# Patient Record
Sex: MALE | URBAN - METROPOLITAN AREA
[De-identification: names, ages, dates, MRNs, and addresses within clinical notes are randomized per-mention and may not be internally consistent; named-entity substitution may affect disease eponyms.]

---

## 2019-03-15 ENCOUNTER — EMERGENCY (EMERGENCY)
Facility: HOSPITAL | Age: 44
LOS: 1 days | Discharge: OTHER INSTITUTION | End: 2019-03-15
Attending: STUDENT IN AN ORGANIZED HEALTH CARE EDUCATION/TRAINING PROGRAM
Payer: COMMERCIAL

## 2019-03-15 VITALS
HEIGHT: 70 IN | WEIGHT: 220.02 LBS | TEMPERATURE: 99 F | OXYGEN SATURATION: 97 % | HEART RATE: 82 BPM | RESPIRATION RATE: 18 BRPM | SYSTOLIC BLOOD PRESSURE: 154 MMHG | DIASTOLIC BLOOD PRESSURE: 96 MMHG

## 2019-03-15 LAB
ALBUMIN SERPL ELPH-MCNC: 4.5 G/DL — SIGNIFICANT CHANGE UP (ref 3.3–5.2)
ALP SERPL-CCNC: 99 U/L — SIGNIFICANT CHANGE UP (ref 40–120)
ALT FLD-CCNC: 45 U/L — HIGH
ANION GAP SERPL CALC-SCNC: 11 MMOL/L — SIGNIFICANT CHANGE UP (ref 5–17)
APTT BLD: 31 SEC — SIGNIFICANT CHANGE UP (ref 27.5–36.3)
AST SERPL-CCNC: 21 U/L — SIGNIFICANT CHANGE UP
BASOPHILS # BLD AUTO: 0 K/UL — SIGNIFICANT CHANGE UP (ref 0–0.2)
BASOPHILS NFR BLD AUTO: 0.2 % — SIGNIFICANT CHANGE UP (ref 0–2)
BILIRUB SERPL-MCNC: 0.5 MG/DL — SIGNIFICANT CHANGE UP (ref 0.4–2)
BUN SERPL-MCNC: 16 MG/DL — SIGNIFICANT CHANGE UP (ref 8–20)
CALCIUM SERPL-MCNC: 9.6 MG/DL — SIGNIFICANT CHANGE UP (ref 8.6–10.2)
CHLORIDE SERPL-SCNC: 106 MMOL/L — SIGNIFICANT CHANGE UP (ref 98–107)
CO2 SERPL-SCNC: 27 MMOL/L — SIGNIFICANT CHANGE UP (ref 22–29)
CREAT SERPL-MCNC: 1.07 MG/DL — SIGNIFICANT CHANGE UP (ref 0.5–1.3)
EOSINOPHIL # BLD AUTO: 0.1 K/UL — SIGNIFICANT CHANGE UP (ref 0–0.5)
EOSINOPHIL NFR BLD AUTO: 0.6 % — SIGNIFICANT CHANGE UP (ref 0–5)
GLUCOSE SERPL-MCNC: 117 MG/DL — HIGH (ref 70–115)
HCT VFR BLD CALC: 42.9 % — SIGNIFICANT CHANGE UP (ref 42–52)
HGB BLD-MCNC: 15.4 G/DL — SIGNIFICANT CHANGE UP (ref 14–18)
INR BLD: 1.04 RATIO — SIGNIFICANT CHANGE UP (ref 0.88–1.16)
LYMPHOCYTES # BLD AUTO: 17.9 % — LOW (ref 20–55)
LYMPHOCYTES # BLD AUTO: 2.2 K/UL — SIGNIFICANT CHANGE UP (ref 1–4.8)
MCHC RBC-ENTMCNC: 31.2 PG — HIGH (ref 27–31)
MCHC RBC-ENTMCNC: 35.9 G/DL — SIGNIFICANT CHANGE UP (ref 32–36)
MCV RBC AUTO: 86.8 FL — SIGNIFICANT CHANGE UP (ref 80–94)
MONOCYTES # BLD AUTO: 1.1 K/UL — HIGH (ref 0–0.8)
MONOCYTES NFR BLD AUTO: 8.8 % — SIGNIFICANT CHANGE UP (ref 3–10)
NEUTROPHILS # BLD AUTO: 9 K/UL — HIGH (ref 1.8–8)
NEUTROPHILS NFR BLD AUTO: 71.9 % — SIGNIFICANT CHANGE UP (ref 37–73)
PLATELET # BLD AUTO: 210 K/UL — SIGNIFICANT CHANGE UP (ref 150–400)
POTASSIUM SERPL-MCNC: 4.8 MMOL/L — SIGNIFICANT CHANGE UP (ref 3.5–5.3)
POTASSIUM SERPL-SCNC: 4.8 MMOL/L — SIGNIFICANT CHANGE UP (ref 3.5–5.3)
PROT SERPL-MCNC: 7.3 G/DL — SIGNIFICANT CHANGE UP (ref 6.6–8.7)
PROTHROM AB SERPL-ACNC: 12 SEC — SIGNIFICANT CHANGE UP (ref 10–12.9)
RBC # BLD: 4.94 M/UL — SIGNIFICANT CHANGE UP (ref 4.6–6.2)
RBC # FLD: 12.9 % — SIGNIFICANT CHANGE UP (ref 11–15.6)
SODIUM SERPL-SCNC: 144 MMOL/L — SIGNIFICANT CHANGE UP (ref 135–145)
WBC # BLD: 12.5 K/UL — HIGH (ref 4.8–10.8)
WBC # FLD AUTO: 12.5 K/UL — HIGH (ref 4.8–10.8)

## 2019-03-15 PROCEDURE — 99284 EMERGENCY DEPT VISIT MOD MDM: CPT

## 2019-03-15 PROCEDURE — 99285 EMERGENCY DEPT VISIT HI MDM: CPT

## 2019-03-15 RX ORDER — SODIUM CHLORIDE 9 MG/ML
1000 INJECTION INTRAMUSCULAR; INTRAVENOUS; SUBCUTANEOUS ONCE
Qty: 0 | Refills: 0 | Status: COMPLETED | OUTPATIENT
Start: 2019-03-15 | End: 2019-03-15

## 2019-03-15 RX ORDER — METOCLOPRAMIDE HCL 10 MG
10 TABLET ORAL ONCE
Qty: 0 | Refills: 0 | Status: COMPLETED | OUTPATIENT
Start: 2019-03-15 | End: 2019-03-15

## 2019-03-15 RX ADMIN — SODIUM CHLORIDE 1000 MILLILITER(S): 9 INJECTION INTRAMUSCULAR; INTRAVENOUS; SUBCUTANEOUS at 22:59

## 2019-03-15 RX ADMIN — Medication 10 MILLIGRAM(S): at 22:59

## 2019-03-15 NOTE — ED STATDOCS - PROGRESS NOTE DETAILS
43 y/o M with PMHx of HTN presents to the ED c/o headache, fever, nausea , numbness of left side, and vomiting, onset 5 days ago. Pt reports intermittent headache and states persistent headache; took Ibuprofen 800 mg and Tamiflu with no relief of symptoms. States, " my teeth hurts and it feels like someone hit me in the back of the head with a bat"; rates headache 5-7/10 in severity. Pt seen by [facility] MD and diagnosed with fever as of 2 days ago and reports fever is returning. Denies similar past headache due to today's symptoms severity. Denies weakness on arms legs, difficulty breathing. Denies diarrhea. Sensation intact. No gross deficits noted. Will send to [facility] for further evaluation and work up.

## 2019-03-15 NOTE — ED STATDOCS - CLINICAL SUMMARY MEDICAL DECISION MAKING FREE TEXT BOX
I, Radha Yates, performed the initial face to face bedside interview with this patient regarding history of present illness and determined that the patient should be seen in the main ED.  The history, was documented by the scribe in my presence and I attest to the accuracy of the documentation.

## 2019-03-16 ENCOUNTER — INPATIENT (INPATIENT)
Facility: HOSPITAL | Age: 44
LOS: 4 days | Discharge: HOME | DRG: 91 | End: 2019-03-21
Attending: SPECIALIST | Admitting: INTERNAL MEDICINE
Payer: COMMERCIAL

## 2019-03-16 ENCOUNTER — TRANSCRIPTION ENCOUNTER (OUTPATIENT)
Age: 44
End: 2019-03-16

## 2019-03-16 VITALS
RESPIRATION RATE: 18 BRPM | SYSTOLIC BLOOD PRESSURE: 139 MMHG | OXYGEN SATURATION: 98 % | HEART RATE: 65 BPM | DIASTOLIC BLOOD PRESSURE: 95 MMHG

## 2019-03-16 VITALS
OXYGEN SATURATION: 98 % | DIASTOLIC BLOOD PRESSURE: 100 MMHG | RESPIRATION RATE: 18 BRPM | WEIGHT: 220.02 LBS | HEART RATE: 68 BPM | SYSTOLIC BLOOD PRESSURE: 146 MMHG | HEIGHT: 68 IN

## 2019-03-16 DIAGNOSIS — I60.9 NONTRAUMATIC SUBARACHNOID HEMORRHAGE, UNSPECIFIED: ICD-10-CM

## 2019-03-16 DIAGNOSIS — I62.9 NONTRAUMATIC INTRACRANIAL HEMORRHAGE, UNSPECIFIED: ICD-10-CM

## 2019-03-16 LAB
ALBUMIN SERPL ELPH-MCNC: 4.5 G/DL — SIGNIFICANT CHANGE UP (ref 3.3–5)
ALBUMIN SERPL ELPH-MCNC: 4.7 G/DL — SIGNIFICANT CHANGE UP (ref 3.3–5)
ALP SERPL-CCNC: 84 U/L — SIGNIFICANT CHANGE UP (ref 40–120)
ALP SERPL-CCNC: 84 U/L — SIGNIFICANT CHANGE UP (ref 40–120)
ALT FLD-CCNC: 42 U/L — SIGNIFICANT CHANGE UP (ref 10–45)
ALT FLD-CCNC: 44 U/L — SIGNIFICANT CHANGE UP (ref 10–45)
ANION GAP SERPL CALC-SCNC: 13 MMOL/L — SIGNIFICANT CHANGE UP (ref 5–17)
ANION GAP SERPL CALC-SCNC: 13 MMOL/L — SIGNIFICANT CHANGE UP (ref 5–17)
APTT BLD: 28.2 SEC — SIGNIFICANT CHANGE UP (ref 27.5–36.3)
AST SERPL-CCNC: 17 U/L — SIGNIFICANT CHANGE UP (ref 10–40)
AST SERPL-CCNC: 21 U/L — SIGNIFICANT CHANGE UP (ref 10–40)
BASOPHILS # BLD AUTO: 0 K/UL — SIGNIFICANT CHANGE UP (ref 0–0.2)
BASOPHILS # BLD AUTO: 0 K/UL — SIGNIFICANT CHANGE UP (ref 0–0.2)
BASOPHILS NFR BLD AUTO: 0.3 % — SIGNIFICANT CHANGE UP (ref 0–2)
BASOPHILS NFR BLD AUTO: 0.3 % — SIGNIFICANT CHANGE UP (ref 0–2)
BILIRUB SERPL-MCNC: 0.6 MG/DL — SIGNIFICANT CHANGE UP (ref 0.2–1.2)
BILIRUB SERPL-MCNC: 0.7 MG/DL — SIGNIFICANT CHANGE UP (ref 0.2–1.2)
BLD GP AB SCN SERPL QL: NEGATIVE — SIGNIFICANT CHANGE UP
BUN SERPL-MCNC: 13 MG/DL — SIGNIFICANT CHANGE UP (ref 7–23)
BUN SERPL-MCNC: 13 MG/DL — SIGNIFICANT CHANGE UP (ref 7–23)
CALCIUM SERPL-MCNC: 9.1 MG/DL — SIGNIFICANT CHANGE UP (ref 8.4–10.5)
CALCIUM SERPL-MCNC: 9.2 MG/DL — SIGNIFICANT CHANGE UP (ref 8.4–10.5)
CHLORIDE SERPL-SCNC: 103 MMOL/L — SIGNIFICANT CHANGE UP (ref 96–108)
CHLORIDE SERPL-SCNC: 104 MMOL/L — SIGNIFICANT CHANGE UP (ref 96–108)
CO2 SERPL-SCNC: 22 MMOL/L — SIGNIFICANT CHANGE UP (ref 22–31)
CO2 SERPL-SCNC: 24 MMOL/L — SIGNIFICANT CHANGE UP (ref 22–31)
CREAT SERPL-MCNC: 0.98 MG/DL — SIGNIFICANT CHANGE UP (ref 0.5–1.3)
CREAT SERPL-MCNC: 1.12 MG/DL — SIGNIFICANT CHANGE UP (ref 0.5–1.3)
EOSINOPHIL # BLD AUTO: 0 K/UL — SIGNIFICANT CHANGE UP (ref 0–0.5)
EOSINOPHIL # BLD AUTO: 0.1 K/UL — SIGNIFICANT CHANGE UP (ref 0–0.5)
EOSINOPHIL NFR BLD AUTO: 0.3 % — SIGNIFICANT CHANGE UP (ref 0–6)
EOSINOPHIL NFR BLD AUTO: 0.4 % — SIGNIFICANT CHANGE UP (ref 0–6)
GLUCOSE SERPL-MCNC: 100 MG/DL — HIGH (ref 70–99)
GLUCOSE SERPL-MCNC: 96 MG/DL — SIGNIFICANT CHANGE UP (ref 70–99)
HCT VFR BLD CALC: 42.6 % — SIGNIFICANT CHANGE UP (ref 39–50)
HCT VFR BLD CALC: 42.6 % — SIGNIFICANT CHANGE UP (ref 39–50)
HGB BLD-MCNC: 14.9 G/DL — SIGNIFICANT CHANGE UP (ref 13–17)
HGB BLD-MCNC: 15.4 G/DL — SIGNIFICANT CHANGE UP (ref 13–17)
INR BLD: 1.11 RATIO — SIGNIFICANT CHANGE UP (ref 0.88–1.16)
LYMPHOCYTES # BLD AUTO: 1.9 K/UL — SIGNIFICANT CHANGE UP (ref 1–3.3)
LYMPHOCYTES # BLD AUTO: 1.9 K/UL — SIGNIFICANT CHANGE UP (ref 1–3.3)
LYMPHOCYTES # BLD AUTO: 14.9 % — SIGNIFICANT CHANGE UP (ref 13–44)
LYMPHOCYTES # BLD AUTO: 15.5 % — SIGNIFICANT CHANGE UP (ref 13–44)
MCHC RBC-ENTMCNC: 30.9 PG — SIGNIFICANT CHANGE UP (ref 27–34)
MCHC RBC-ENTMCNC: 31.7 PG — SIGNIFICANT CHANGE UP (ref 27–34)
MCHC RBC-ENTMCNC: 35 GM/DL — SIGNIFICANT CHANGE UP (ref 32–36)
MCHC RBC-ENTMCNC: 36.1 GM/DL — HIGH (ref 32–36)
MCV RBC AUTO: 87.9 FL — SIGNIFICANT CHANGE UP (ref 80–100)
MCV RBC AUTO: 88.3 FL — SIGNIFICANT CHANGE UP (ref 80–100)
MONOCYTES # BLD AUTO: 0.9 K/UL — SIGNIFICANT CHANGE UP (ref 0–0.9)
MONOCYTES # BLD AUTO: 0.9 K/UL — SIGNIFICANT CHANGE UP (ref 0–0.9)
MONOCYTES NFR BLD AUTO: 7.1 % — SIGNIFICANT CHANGE UP (ref 2–14)
MONOCYTES NFR BLD AUTO: 7.1 % — SIGNIFICANT CHANGE UP (ref 2–14)
NEUTROPHILS # BLD AUTO: 10 K/UL — HIGH (ref 1.8–7.4)
NEUTROPHILS # BLD AUTO: 9.6 K/UL — HIGH (ref 1.8–7.4)
NEUTROPHILS NFR BLD AUTO: 76.8 % — SIGNIFICANT CHANGE UP (ref 43–77)
NEUTROPHILS NFR BLD AUTO: 77.3 % — HIGH (ref 43–77)
PLATELET # BLD AUTO: 221 K/UL — SIGNIFICANT CHANGE UP (ref 150–400)
PLATELET # BLD AUTO: 224 K/UL — SIGNIFICANT CHANGE UP (ref 150–400)
POTASSIUM SERPL-MCNC: 3.8 MMOL/L — SIGNIFICANT CHANGE UP (ref 3.5–5.3)
POTASSIUM SERPL-MCNC: 3.8 MMOL/L — SIGNIFICANT CHANGE UP (ref 3.5–5.3)
POTASSIUM SERPL-SCNC: 3.8 MMOL/L — SIGNIFICANT CHANGE UP (ref 3.5–5.3)
POTASSIUM SERPL-SCNC: 3.8 MMOL/L — SIGNIFICANT CHANGE UP (ref 3.5–5.3)
PROT SERPL-MCNC: 6.9 G/DL — SIGNIFICANT CHANGE UP (ref 6–8.3)
PROT SERPL-MCNC: 7 G/DL — SIGNIFICANT CHANGE UP (ref 6–8.3)
PROTHROM AB SERPL-ACNC: 12.7 SEC — SIGNIFICANT CHANGE UP (ref 10–12.9)
RAPID RVP RESULT: SIGNIFICANT CHANGE UP
RBC # BLD: 4.82 M/UL — SIGNIFICANT CHANGE UP (ref 4.2–5.8)
RBC # BLD: 4.84 M/UL — SIGNIFICANT CHANGE UP (ref 4.2–5.8)
RBC # FLD: 12 % — SIGNIFICANT CHANGE UP (ref 10.3–14.5)
RBC # FLD: 12.3 % — SIGNIFICANT CHANGE UP (ref 10.3–14.5)
RH IG SCN BLD-IMP: POSITIVE — SIGNIFICANT CHANGE UP
RH IG SCN BLD-IMP: POSITIVE — SIGNIFICANT CHANGE UP
SODIUM SERPL-SCNC: 139 MMOL/L — SIGNIFICANT CHANGE UP (ref 135–145)
SODIUM SERPL-SCNC: 140 MMOL/L — SIGNIFICANT CHANGE UP (ref 135–145)
WBC # BLD: 12.5 K/UL — HIGH (ref 3.8–10.5)
WBC # BLD: 12.9 K/UL — HIGH (ref 3.8–10.5)
WBC # FLD AUTO: 12.5 K/UL — HIGH (ref 3.8–10.5)
WBC # FLD AUTO: 12.9 K/UL — HIGH (ref 3.8–10.5)

## 2019-03-16 PROCEDURE — 99285 EMERGENCY DEPT VISIT HI MDM: CPT | Mod: 25

## 2019-03-16 PROCEDURE — 70496 CT ANGIOGRAPHY HEAD: CPT

## 2019-03-16 PROCEDURE — 70450 CT HEAD/BRAIN W/O DYE: CPT

## 2019-03-16 PROCEDURE — 99291 CRITICAL CARE FIRST HOUR: CPT

## 2019-03-16 PROCEDURE — 85730 THROMBOPLASTIN TIME PARTIAL: CPT

## 2019-03-16 PROCEDURE — 85027 COMPLETE CBC AUTOMATED: CPT

## 2019-03-16 PROCEDURE — 85610 PROTHROMBIN TIME: CPT

## 2019-03-16 PROCEDURE — 70496 CT ANGIOGRAPHY HEAD: CPT | Mod: 26

## 2019-03-16 PROCEDURE — 96374 THER/PROPH/DIAG INJ IV PUSH: CPT | Mod: XU

## 2019-03-16 PROCEDURE — 70450 CT HEAD/BRAIN W/O DYE: CPT | Mod: 26

## 2019-03-16 PROCEDURE — 80053 COMPREHEN METABOLIC PANEL: CPT

## 2019-03-16 PROCEDURE — 36415 COLL VENOUS BLD VENIPUNCTURE: CPT

## 2019-03-16 PROCEDURE — 96375 TX/PRO/DX INJ NEW DRUG ADDON: CPT | Mod: XU

## 2019-03-16 PROCEDURE — 96361 HYDRATE IV INFUSION ADD-ON: CPT

## 2019-03-16 PROCEDURE — 70450 CT HEAD/BRAIN W/O DYE: CPT | Mod: 26,77,59

## 2019-03-16 RX ORDER — OXYCODONE AND ACETAMINOPHEN 5; 325 MG/1; MG/1
1 TABLET ORAL ONCE
Qty: 0 | Refills: 0 | Status: DISCONTINUED | OUTPATIENT
Start: 2019-03-16 | End: 2019-03-16

## 2019-03-16 RX ORDER — ONDANSETRON 8 MG/1
4 TABLET, FILM COATED ORAL ONCE
Qty: 0 | Refills: 0 | Status: COMPLETED | OUTPATIENT
Start: 2019-03-16 | End: 2019-03-16

## 2019-03-16 RX ORDER — NICARDIPINE HYDROCHLORIDE 30 MG/1
5 CAPSULE, EXTENDED RELEASE ORAL
Qty: 40 | Refills: 0 | Status: DISCONTINUED | OUTPATIENT
Start: 2019-03-16 | End: 2019-03-18

## 2019-03-16 RX ORDER — ACETAMINOPHEN 500 MG
1000 TABLET ORAL ONCE
Qty: 0 | Refills: 0 | Status: COMPLETED | OUTPATIENT
Start: 2019-03-16 | End: 2019-03-16

## 2019-03-16 RX ORDER — HYDRALAZINE HCL 50 MG
10 TABLET ORAL ONCE
Qty: 0 | Refills: 0 | Status: COMPLETED | OUTPATIENT
Start: 2019-03-16 | End: 2019-03-16

## 2019-03-16 RX ORDER — LEVETIRACETAM 250 MG/1
1000 TABLET, FILM COATED ORAL ONCE
Qty: 0 | Refills: 0 | Status: COMPLETED | OUTPATIENT
Start: 2019-03-16 | End: 2019-03-16

## 2019-03-16 RX ORDER — MORPHINE SULFATE 50 MG/1
4 CAPSULE, EXTENDED RELEASE ORAL ONCE
Qty: 0 | Refills: 0 | Status: DISCONTINUED | OUTPATIENT
Start: 2019-03-16 | End: 2019-03-16

## 2019-03-16 RX ORDER — METOPROLOL TARTRATE 50 MG
10 TABLET ORAL ONCE
Qty: 0 | Refills: 0 | Status: DISCONTINUED | OUTPATIENT
Start: 2019-03-16 | End: 2019-03-16

## 2019-03-16 RX ADMIN — MORPHINE SULFATE 4 MILLIGRAM(S): 50 CAPSULE, EXTENDED RELEASE ORAL at 11:08

## 2019-03-16 RX ADMIN — OXYCODONE AND ACETAMINOPHEN 1 TABLET(S): 5; 325 TABLET ORAL at 18:15

## 2019-03-16 RX ADMIN — SODIUM CHLORIDE 1000 MILLILITER(S): 9 INJECTION INTRAMUSCULAR; INTRAVENOUS; SUBCUTANEOUS at 01:04

## 2019-03-16 RX ADMIN — ONDANSETRON 4 MILLIGRAM(S): 8 TABLET, FILM COATED ORAL at 05:08

## 2019-03-16 RX ADMIN — LEVETIRACETAM 400 MILLIGRAM(S): 250 TABLET, FILM COATED ORAL at 06:08

## 2019-03-16 RX ADMIN — OXYCODONE AND ACETAMINOPHEN 1 TABLET(S): 5; 325 TABLET ORAL at 17:19

## 2019-03-16 RX ADMIN — Medication 1000 MILLIGRAM(S): at 14:00

## 2019-03-16 RX ADMIN — Medication 400 MILLIGRAM(S): at 19:13

## 2019-03-16 RX ADMIN — Medication 10 MILLIGRAM(S): at 12:50

## 2019-03-16 RX ADMIN — Medication 1 TABLET(S): at 02:04

## 2019-03-16 RX ADMIN — Medication 1000 MILLIGRAM(S): at 20:30

## 2019-03-16 RX ADMIN — Medication 400 MILLIGRAM(S): at 13:22

## 2019-03-16 RX ADMIN — MORPHINE SULFATE 4 MILLIGRAM(S): 50 CAPSULE, EXTENDED RELEASE ORAL at 12:20

## 2019-03-16 RX ADMIN — ONDANSETRON 4 MILLIGRAM(S): 8 TABLET, FILM COATED ORAL at 11:29

## 2019-03-16 RX ADMIN — Medication 10 MILLIGRAM(S): at 05:08

## 2019-03-16 RX ADMIN — Medication 400 MILLIGRAM(S): at 06:08

## 2019-03-16 RX ADMIN — Medication 1 TABLET(S): at 01:04

## 2019-03-16 RX ADMIN — ONDANSETRON 4 MILLIGRAM(S): 8 TABLET, FILM COATED ORAL at 13:27

## 2019-03-16 NOTE — ED ADULT NURSE NOTE - CHPI ED NUR SYMPTOMS NEG
no vomiting/no nausea/no numbness/no loss of consciousness/no blurred vision/no dizziness/no change in level of consciousness/no confusion/no weakness/no fever

## 2019-03-16 NOTE — CONSULT NOTE ADULT - SUBJECTIVE AND OBJECTIVE BOX
HPI:  43yo male PMH HTN tx from OSH, (16 Mar 2019 10:34) who p/w sudden onset severe HA that began Monday and has persisted despite attempts at medical management. Patient states that his HA has waxed and waned since onset, but has not fully resolved. He denies any vision change, focal weakness, and seizure, and does not smoke or have family hx of intracranial hemorrhage. CTH in ED shows small punctate hemorrhage of L posterior internal capsule. Per outside report, CTA at OSH shows concern for Flores Flores disease, however imaging is not available at this time. Patient is neurologically intact on exam only complaining of HA and photosensitivity.      PAST MEDICAL HISTORY   No pertinent past medical history    PAST SURGICAL HISTORY           SOCIAL HISTORY:   Occupation:   Marital Status:     FAMILY HISTORY:      PHYSICAL EXAM:    Constitutional: No Acute Distress     Neurological: AOx3, Following Commands, Moving all Extremities   PERRL, EOMI  Face symmetric tongue midline  Motor exam:          Upper extremity                         Delt     Bicep     Tricep    HG                                                 R         5/5        5/5        5/5       5/5                                               L          5/5        5/5        5/5       5/5          Lower extremity                        HF         KF        KE       DF         PF                                                  R        5/5        5/5        5/5       5/5         5/5                                               L         5/5        5/5       5/5       5/5          5/5                                                 Sensation: [x] intact to light touch  [] decreased:     Pulmonary: Clear to Auscultation, No rales, No rhonchi, No wheezes     Cardiovascular: S1, S2, Regular rate and rhythm     Gastrointestinal: Soft, Non-tender, Non-distended     Extremities: No calf tenderness     Incision:     LABS:                        15.4   12.9  )-----------( 224      ( 16 Mar 2019 07:26 )             42.6     03-16    139  |  104  |  13  ----------------------------<  100<H>  3.8   |  22  |  0.98    Ca    9.1      16 Mar 2019 07:26    TPro  7.0  /  Alb  4.5  /  TBili  0.6  /  DBili  x   /  AST  21  /  ALT  44  /  AlkPhos  84  03-16    PT/INR - ( 16 Mar 2019 07:26 )   PT: 12.7 sec;   INR: 1.11 ratio         PTT - ( 16 Mar 2019 07:26 )  PTT:28.2 sec HPI:  45yo male PMH HTN tx from OSH, (16 Mar 2019 10:34) who p/w sudden onset severe HA that began Monday and has persisted despite attempts at medical management. Patient states that his HA has waxed and waned since onset, but has not fully resolved. He denies any vision change, focal weakness, and seizure, and does not smoke or have family hx of intracranial hemorrhage. CTH in ED shows small punctate hemorrhage of L posterior external capsule/insula. Per outside report, CTA at OSH shows concern for Flores Flores disease, however imaging is not available at this time. Patient is neurologically intact on exam only complaining of HA and photosensitivity.      PAST MEDICAL HISTORY   No pertinent past medical history    PAST SURGICAL HISTORY           SOCIAL HISTORY:   Occupation:   Marital Status:     FAMILY HISTORY:      PHYSICAL EXAM:    Constitutional: No Acute Distress     Neurological: AOx3, Following Commands, Moving all Extremities   PERRL, EOMI  Face symmetric tongue midline  Motor exam:          Upper extremity                         Delt     Bicep     Tricep    HG                                                 R         5/5        5/5        5/5       5/5                                               L          5/5        5/5        5/5       5/5          Lower extremity                        HF         KF        KE       DF         PF                                                  R        5/5        5/5        5/5       5/5         5/5                                               L         5/5        5/5       5/5       5/5          5/5                                                 Sensation: [x] intact to light touch  [] decreased:     Pulmonary: Clear to Auscultation, No rales, No rhonchi, No wheezes     Cardiovascular: S1, S2, Regular rate and rhythm     Gastrointestinal: Soft, Non-tender, Non-distended     Extremities: No calf tenderness     Incision:     LABS:                        15.4   12.9  )-----------( 224      ( 16 Mar 2019 07:26 )             42.6     03-16    139  |  104  |  13  ----------------------------<  100<H>  3.8   |  22  |  0.98    Ca    9.1      16 Mar 2019 07:26    TPro  7.0  /  Alb  4.5  /  TBili  0.6  /  DBili  x   /  AST  21  /  ALT  44  /  AlkPhos  84  03-16    PT/INR - ( 16 Mar 2019 07:26 )   PT: 12.7 sec;   INR: 1.11 ratio         PTT - ( 16 Mar 2019 07:26 )  PTT:28.2 sec HPI:  43yo male PMH HTN tx from OSH, (16 Mar 2019 10:34) who p/w sudden onset severe HA that began Monday and has persisted despite attempts at medical management. Patient states that his HA has waxed and waned since onset, but has not fully resolved. He denies any vision change, focal weakness, and seizure, and does not smoke or have family hx of intracranial hemorrhage. CTH in ED shows small punctate hemorrhage of L posterior external capsule/insula. CTA at OSH shows concern for Flores Flores disease. Patient is neurologically intact on exam only complaining of HA and photosensitivity.      PAST MEDICAL HISTORY   No pertinent past medical history    PAST SURGICAL HISTORY           SOCIAL HISTORY:   Occupation:   Marital Status:     FAMILY HISTORY:      PHYSICAL EXAM:    Constitutional: No Acute Distress     Neurological: AOx3, Following Commands, Moving all Extremities   PERRL, EOMI  Face symmetric tongue midline  Motor exam:          Upper extremity                         Delt     Bicep     Tricep    HG                                                 R         5/5        5/5        5/5       5/5                                               L          5/5        5/5        5/5       5/5          Lower extremity                        HF         KF        KE       DF         PF                                                  R        5/5        5/5        5/5       5/5         5/5                                               L         5/5        5/5       5/5       5/5          5/5                                                 Sensation: [x] intact to light touch  [] decreased:     Pulmonary: Clear to Auscultation, No rales, No rhonchi, No wheezes     Cardiovascular: S1, S2, Regular rate and rhythm     Gastrointestinal: Soft, Non-tender, Non-distended     Extremities: No calf tenderness     Incision:     LABS:                        15.4   12.9  )-----------( 224      ( 16 Mar 2019 07:26 )             42.6     03-16    139  |  104  |  13  ----------------------------<  100<H>  3.8   |  22  |  0.98    Ca    9.1      16 Mar 2019 07:26    TPro  7.0  /  Alb  4.5  /  TBili  0.6  /  DBili  x   /  AST  21  /  ALT  44  /  AlkPhos  84  03-16    PT/INR - ( 16 Mar 2019 07:26 )   PT: 12.7 sec;   INR: 1.11 ratio         PTT - ( 16 Mar 2019 07:26 )  PTT:28.2 sec

## 2019-03-16 NOTE — CONSULT NOTE ADULT - CONSULT REASON
DENIZ, 10/10 HA & flu like symptoms since Monday evening SAH & L IPH on [facility]   10/10 HA & flu like symptoms since Monday evening w vomiting 5+

## 2019-03-16 NOTE — CONSULT NOTE ADULT - ASSESSMENT
10/10 HA & flu like symptoms since Monday evening  left-handed male AAOx3, nonfocal exam w 2/10 HA at this time     SAH/ IPH on CTH  monitored bed  SBP< 150mmHg   HOB >30 degrees 10/10 HA & flu like symptoms since Monday evening  left-handed male AAOx3, nonfocal exam w 2/10 HA at this time   HTN and H/H score of 2    SAH/ IPH on CTH  CTA: suspected dang dang     monitored bed  SBP< 150mmHg   HOB >30 degrees 10/10 HA & flu like symptoms since Monday evening, improving to 2/10 on exam in ED  left-handed male AAOx3, nonfocal exam w 2/10 HA at this time   HTN and H/H score of 2    SAH/ IPH on CTH  CTA: suspected dang dang     monitored bed & q1Hr Neurochecks   SBP< 150mmHg   HOB >30 degrees   AED  angiogram and MRI B w/wo santana recommended for further w/u & transfer to [facility]/ Dr. Badillo   [facility] IV for current headache/ bifrontal pressure 5/10 at the time of re-eval 5:42 AM    case d/w Dr Orta 4:57 AM/ plan above and tx to [facility]   case d/w Dr Robles/ HARSHA Lama     pt and wife updated and all questions answered in great detail

## 2019-03-16 NOTE — H&P ADULT - HISTORY OF PRESENT ILLNESS
45yo male PMH HTN tx from OSH, 43yo RH  male PMH HTN tx from OSH, p/w HA. LKW 3/11 2230. Patient developed sudden onset HA(felt like baseball bat to the head) at 2230 on 3/11; 15 mins later he developed intractable N/V. Went to Urgent Care 3/12, sent home on tamiflu. HA/nausea/vomiting persisted, so patient went to [facility] ED where CT/CTA done showing IPH in L ventral parietal lobe rupturing into subarachnoid space, CTA head w/ findings concerning for Flores Flores. Transferred to [facility] for further workup. On initial evaluation at [facility], patient HA improved although with photophobia, no focal neurologic deficits, NIHSS 0.

## 2019-03-16 NOTE — ED ADULT NURSE NOTE - OBJECTIVE STATEMENT
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 PMH of HTN. Patient presented to [facility] with headache, received 1000mg IV tylenol before transfer, now reports 3/10 pain. On arrival to [facility] ED, Patient is awake, alert, a&ox3, following commands, strong equal strength bilaterally x 4, sensory in tact. 20g peripheral IV in R ac placed at [facility] patent and draws back blood. 20g peripheral IV placed in L ac and labs drawn and sent to lab. Pt. passed dysphagia screening. Patient undressed and placed into gown, call bell in hand and side rails up with bed in lowest position for safety. blanket provided. Comfort and safety provided.

## 2019-03-16 NOTE — ED PROVIDER NOTE - ATTENDING CONTRIBUTION TO CARE
pt w larios, transferred here for Neurosurgery eval for vincent dang  moving 4/4, clear speech. non-focal exam.  bp control. dw Neurosurgery. pre-op labs. bp less than 160. keppra prior to transfer. pain control.

## 2019-03-16 NOTE — H&P ADULT - ASSESSMENT
43yo RH  male PMH HTN tx from OSH, p/w HA. NIHSS 0, MRS 0, ICH score 1. Patient w/ L parietal IPH extending into subarachnoid space, CTA head findings concerning for Flores Flores. IPH etiology unclear, likely HTN but given vascular abnormalities visualized on CTA head, would consider additional imaging evaluation/possible angiography to better characterize cerebral vasculature. Not a TPA candidate or thrombectomy due to ICH, out of window    Recs:  [] Strict BP control goal <160/90 w/ Cardene drip  [] Repeat CTH in 24hrs or prn for worsening mental status or neuro exam  [] q2 neuro checks, vitals  [] hold ASA, lovenox, use mechanical DVT prophylaxis  [] Telemetry Monitoring  [] MRI brain without contrast and MRA Head w/o contrast/MRA Neck w/ contrast(NOVA study)  [] HgA1c, Lipid profile 45yo RH  male PMH HTN tx from OSH, p/w HA. NIHSS 0, MRS 0, ICH score 1. Patient w/ L parietal IPH extending into subarachnoid space, CTA head findings concerning for Flores Flores. IPH etiology unclear, likely HTN but given vascular abnormalities visualized on CTA head, would consider additional imaging evaluation/possible angiography to better characterize cerebral vasculature. Not a TPA candidate or thrombectomy due to ICH, out of window    Recs:  [] Strict BP control goal <160/90 w/ Cardene drip  [] Repeat CTH in 24hrs or prn for worsening mental status or neuro exam  [] q4 neuro checks, vitals  [] hold ASA, lovenox, use mechanical DVT prophylaxis  [] Telemetry Monitoring  [] MRI brain without contrast and MRA Head w/o contrast/MRA Neck w/ contrast(NOVA study)  [] HgA1c, Lipid profile 43yo RH  male PMH HTN tx from OSH, p/w HA. NIHSS 0, MRS 0, ICH score 1. Patient w/ L parietal IPH extending into subarachnoid space, CTA head findings concerning for Flores Flores. IPH etiology unclear, likely HTN but given vascular abnormalities visualized on CTA head, would consider additional imaging evaluation/possible angiography to better characterize cerebral vasculature. Not a TPA candidate or thrombectomy due to ICH, out of window    Recs:  Admit to stroke unit  [] Strict BP control goal <160/90 w/ Cardene drip  [] Repeat CTH in 24hrs or prn for worsening mental status or neuro exam  [] q4 neuro checks, vitals  [] hold ASA, lovenox, use mechanical DVT prophylaxis  [] Telemetry Monitoring  [] MRI brain without contrast and MRA Head w/o contrast/MRA Neck w/ contrast(NOVA study)  [] HgA1c, Lipid profile 45yo RH  male PMH HTN tx from OSH, p/w HA. NIHSS 0, MRS 0, ICH score 1. Patient w/ L parietal IPH extending into subarachnoid space, CTA head findings concerning for Flores Flores. IPH etiology unclear, likely HTN but given vascular abnormalities visualized on CTA head, would consider additional imaging evaluation/possible angiography to better characterize cerebral vasculature. Not a TPA candidate or thrombectomy due to ICH, out of window    Recs:  Admit to stroke unit  [] Strict BP control goal <160/90 w/ Cardene drip  [] Repeat CTH in 24hrs or prn for worsening mental status or neuro exam  [] q4 neuro checks, vitals  [] hold ASA, lovenox, use mechanical DVT prophylaxis  [] Telemetry Monitoring  [] MRI brain without contrast and MRA Head w/o contrast/MRA Neck w/ contrast(NOVA study)  [] HgA1c, Lipid profile  [] Possible Angiography as per NSx    Med rec completed 45yo RH  male PMH HTN tx from OSH, p/w HA. NIHSS 0, MRS 0, ICH score 1. Patient w/ L parietal IPH extending into subarachnoid space, CTA head findings concerning for Flores Flores. IPH etiology unclear, likely HTN but given vascular abnormalities visualized on CTA head, would consider additional imaging evaluation/possible angiography to better characterize cerebral vasculature. Not a TPA candidate or thrombectomy due to ICH, out of window    Recs:  Admit to stroke unit  [] Strict BP control goal <160/90 w/ Cardene drip  [] Repeat CTH in 24hrs or prn for worsening mental status or neuro exam  [] q4 neuro checks, vitals  [] hold ASA, lovenox, use mechanical DVT prophylaxis  [] Telemetry Monitoring  [] MRI brain without contrast and MRA Head w/o contrast/MRA Neck w/ contrast(NOVA study)  [] HgA1c, Lipid profile  [] Possible Angiography as per NSx  [] ESR, CRP, JOSE, ANCA, C3    Med rec completed

## 2019-03-16 NOTE — CONSULT NOTE ADULT - NSICDXPROBLEM_GEN_ALL_CORE_FT
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

## 2019-03-16 NOTE — ED PROVIDER NOTE - PROGRESS NOTE DETAILS
Contacted by radiology for thalamic bleed with subarachnoid hemm, contacted neuro who is recommending cta, neuro will see pt.

## 2019-03-16 NOTE — ED ADULT NURSE REASSESSMENT NOTE - NS ED NURSE REASSESS COMMENT FT1
Nicardipine infusion ordered by MD to maintain systolic BP of less than or equal to 160. Infusion not started due to pt. maintain BP below 160 (157/96). Will start infusion when indicated per MD orders. MD aware.
Neuro MD at bedside assessing patient. Pt. is not admitted yet at this time.

## 2019-03-16 NOTE — ED PROVIDER NOTE - ATTENDING CONTRIBUTION TO CARE
I personally saw the patient with the PA, and completed the key components of the history and physical exam. I then discussed the management plan with the PA.  history of HTN with 5 days of worsening ha, vomiting, left sided numbness - CN II - XII intact, EOMI, PERRL, 5/5 muscle strength x 4 extremities, no sensory deficits, no ddk, no dysmetria, negative babinski, negative kernig, no ataxic gait, normal SHELLIE and FNT, negative romberg  r/o intracranial pathology - cth

## 2019-03-16 NOTE — DISCHARGE NOTE NURSING/CASE MANAGEMENT/SOCIAL WORK - NSDCDPATPORTLINK_GEN_ALL_CORE
You can access the [facility] Patient Portal, offered by [facility], by registering with the following website: [email or web address]

## 2019-03-16 NOTE — DISCHARGE NOTE NURSING/CASE MANAGEMENT/SOCIAL WORK - NSDCPEPTSTRK_GEN_ALL_CORE
Need for follow up after discharge/Prescribed medications/Risk factors for stroke/Stroke education booklet/Stroke warning signs and symptoms/Signs and symptoms of stroke/Call 911 for stroke/Stroke support groups for patients, families, and friends

## 2019-03-16 NOTE — ED PROVIDER NOTE - OBJECTIVE STATEMENT
45yo male PMH HTN presenting with headache and vomiting. Patient transferred from OSH for parenychmal bleed extending into subarachnoid space. Headache began on Monday suddenly, followed by vomiting. Patient initially told that he had flu, went hme. Went back to urgent care last night and was told that he had sinus infection, developed fever and went to ED at [facility]. THere, patient was found to have ICH and transferred to [facility] for NSG evaluation.

## 2019-03-16 NOTE — ED ADULT NURSE NOTE - NSIMPLEMENTINTERV_GEN_ALL_ED
Implemented All Fall with Harm Risk Interventions:  [place] to call system. Call bell, personal items and telephone within reach. Instruct patient to call for assistance. Room bathroom lighting operational. Non-slip footwear when patient is off stretcher. Physically safe environment: no spills, clutter or unnecessary equipment. Stretcher in lowest position, wheels locked, appropriate side rails in place. Provide visual cue, wrist band, yellow gown, etc. Monitor gait and stability. Monitor for mental status changes and reorient to person, place, and time. Review medications for side effects contributing to fall risk. Reinforce activity limits and safety measures with patient and family. Provide visual clues: red socks.

## 2019-03-16 NOTE — ED ADULT NURSE NOTE - OBJECTIVE STATEMENT
Patient A&Ox4 complaining of throbbing headache & neck pain that began this past Monday. Also complaining of sensitivity to light, nausea. Denies any chest pain, shortness of breath, or dizziness. Wife at bedside stated patient has been taking Rx strength ibuprofen, Excedrin, Tamiflu & Tylenol throughout the weeks. Stated has noticed patient has been increasing lethargic.

## 2019-03-16 NOTE — ED PROVIDER NOTE - NSRISKOFTRANSFER_ED_A_ED
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 He returned to [facility] on 7/22/18 with decompensated CHF. He was started on primacor, lasix infusion and sprinolactone but unfortunately his low BP deterred aggressive treatment. After consulting with cardiology, recommendation was made to transfer him to [facility] to evaluate for advanced HF options.       Subjective:     - Seen and examined this morning.  - reports feeling ok, still SOB with exertion. Denies any CP or palpitations.               Objective:     Blood pressure 110/67, pulse 84, temperature 98 °F (36.7 °C), temperature source Oral, resp. rate 18, height 5' 6\" (1.676 m), weight 75.7 kg, SpO2 97 %.    General:  No acute distress  Skin:  Warm and dry without rash  HEENT: Normocephalic, atraumatic, PERRL, intact extra-ocular movement  Neck:  Trachea is midline. No adenopathy. JVD+    Cardiovascular:  RRR, normal S1, S2, , systolic murmur present  Respiratory: Clear to auscultation bilaterally.  No wheezes, rales or rhonchi.  Gastrointestinal:  Soft, nontender and nondistended, no organomegaly. bowel sounds present.  Neuro:   AO x 3, no focal deficits  Psychiatric:   Cooperative.  Appropriate mood & affect.  Normal judgment.  Extremities: mild pitting edema of the lower extremities bilaterally, distal pulses intact.     Labs and Imaging:     Laboratory Studies:    Recent Labs  Lab 07/31/18  0410  07/30/18  0540 07/29/18  2345  07/29/18  0618   WBC 6.4 6.3  --   --  6.5   HCT 30.5* 30.2*  --   --  30.9*   HGB 9.8* 9.6*  --   --  9.7*    175  --   --  180   SODIUM 145 142  --   --  141   POTASSIUM 3.6 3.8 3.7  < > 3.7   CHLORIDE 106 105  --   --  105   CO2 31 32  --   --  29   CALCIUM 8.4 8.9  --   --  9.0   GLUCOSE 86 94  --   --  98   BUN 43* 45*  --   --  44*   CREATININE 2.02* 1.96*  --   --  1.79*   GFRNA 30 31  --   --  35   < > = values in this interval not displayed.      Glucometer Checks    No results available in last 24 hours    Microbiology     Imaging Studies:      No orders to display       Echo 7/23/18  Severe global left ventricular hypokinesis with regional variations.  Borderline increased left ventricular cavity size.  Mildly increased right ventricular size.  Mildly decreased right ventricular systolic function.  Catheter/pacemaker wire visualized in the right ventricle.  Severely increased left atrial size.  Mildly increased right atrial size.  Mild-to-moderate mitral valve regurgitation.  Mild aortic valve regurgitation.     Assessment and Plan:     Principal Problem:    Acute on chronic systolic congestive heart failure (CMS/HCC)  Active Problems:    Mixed hyperlipidemia    [employer] BiV ICD: implanted 07/17/2018    Ischemic cardiomyopathy    Coronary artery disease involving native coronary artery of native heart without angina pectoris    Aortic valve stenosis    Sustained VT (ventricular tachycardia) (CMS/HCC)    Moderate to severe pulmonary hypertension (CMS/HCC)    CKD (chronic kidney disease), stage III    Cardiac LV ejection fraction 21-30%       PLAN:  · Improving volume status. Cardiology and EP were consulted at [facility]. He was transitioned to PO torsemide and amiodarone dose was increased to 200 mg bid. ICD was also upgraded to BiV-ICD on a previous admission on 7/17.  · Transferred here for advanced HF team evaluation. Awaiting further recommendations.   · PT/OT  orders placed. Patient was at [facility] prior to admission.    · For chronic medical conditions:  · Continue asa  · Continue klonopin, neurontin 100 mg bid.    DVT Prophylaxis:    Mechanical: SCD  Pharmacologic: Heparin    GI Prophylaxis:    Protonix    Code Status:    Full Resuscitation         Barriers:   Unresolved medical issues: AHFT evaluation.    Destination:   Home vs [facility]    LACE Score:  LACE(10 or more):High            1 LACE Length of Stay    3 LACE Acute Admission    5 LACE Charlson Comorbidity Index Evalution    4 LACE Visits to ED Previous 6 Months           ______________________  Hedy Saunders MD   [facility] Hospitalist  Pager  [phone]    Please Contact the Hospitalist caring for the patient until 7:00pm. From 7:00pm to 7:00 am contact the Hospitalist on call (PAGER: [phone])     Deterioration of Condition En Route

## 2019-03-16 NOTE — ED PROVIDER NOTE - OBJECTIVE STATEMENT
43 y/o M with PMHx of HTN presents to the ED c/o headache, fever, nausea , numbness of left side, and vomiting, onset 5 days ago. Pt reports intermittent headache and states persistent headache; took Ibuprofen 800 mg and Tamiflu with no relief of symptoms. States, " my teeth hurts and it feels like someone hit me in the back of the head with a bat"; rates headache 7/10 in severity. Pt seen by [facility] and diagnosed with fever as of 2 days ago and reports fever is returning. States he has never felt this headache before.  Denies loss of vision, blurry vision, decreased sensation in upper and lower extremities, cp, shortness of breath, abdominal pain. 45 y/o M with PMHx of HTN presents to the ED c/o headache, fever, nausea , numbness of left side, and vomiting, onset 5 days ago. Pt was recently seen at urgent care and tested positive for Influenza A. Pt reports intermittent headache onset 5 days ago after multiple episodes of vomiting and retching; has been taking ibuprofen, excedrin extra strength and Tamiflu with no relief of symptoms. States pain is severe localized to the back of the head noting that even the "pillow causes pain" rates headache 7/10 in severity. States has intermittent facial numbness on the left side. States he has never felt this headache before.  Denies loss of vision, blurry vision, decreased sensation in upper and lower extremities, cp, shortness of breath, abdominal pain.

## 2019-03-16 NOTE — H&P ADULT - NSHPPHYSICALEXAM_GEN_ALL_CORE
Neurological Exam:  Mental Status: Orientated to self, date and place.  Attention intact.  No dysarthria, aphasia or neglect.      Cranial Nerves: PERRL, EOMI, VFF, no nystagmus or diplopia. CN V1-3 intact to light touch.  No facial asymmetry. Tongue, uvula and palate midline.     Motor:   Tone: normal.                  Strength: intact throughout  Pronator drift: none                 Dysmeria: None to finger-nose-finger   Tremor: No resting, postural or action tremor.  No myoclonus.    Sensation: intact to light touch    Deep Tendon Reflexes: 2+ bilateral biceps, triceps, brachioradialis, knee  Toes flexor bilaterally

## 2019-03-16 NOTE — ED PROVIDER NOTE - PHYSICAL EXAMINATION
Gen: uncomfortable appearing, eyes closed, AOx3  Head: NCAT  HEENT: PERRL, EOMI, oral mucosa moist, normal conjunctiva  Lung: CTAB, no respiratory distress, no wheezing, rales, rhonchi  CV: normal s1/s2, rrr, no murmurs  Abd: soft, NTND, no CVA tenderness  MSK: No edema, no visible deformities, full range of motion in all 4 extremities  Neuro: No focal neurologic deficits  Skin: No rash   Psych: normal affect

## 2019-03-16 NOTE — CONSULT NOTE ADULT - SUBJECTIVE AND OBJECTIVE BOX
Patient is a 44y old  Male who presents with a chief complaint of Elda Casey (MD) (16 Mar 2019 04:19)      HPI:      + - LOC   + - trauma   denied Headache   pt c/o + -headache  /10 on the pain scale   + - Nausea /+ - Vomiting   Right Left hand dominant   admits denies new/ worsening weakness  admits denies new/ worsening parasthesias/ numbness   admist denies  new/ worsening visual changes  admists denies C- Spine pain, + - collar/ cleared by trauma   admits denies T/LS  Spine pain, + - brace   admits denies Bowel/ Bladder dysfunction   last seen normal as per family        traumatic atraumatic SAH - [facility]: 2      NIHSS: total score  1a LOC  0=alert     1b	LOC Questions	  0 = Answers both correctly.    1c	LOC Commands	  0 = Performs both tasks correctly.    2	Gaze	  0 = Normal.    0 = No visual loss.    5a	Left Arm Motor	  0 = No drift    5b	Right Arm Motor	  0 = No drift    6a	Left Leg Motor	  0 = No drift    6b	Right Leg Motor	  0 = No drift  1 = Drift before 5 seconds  2 = Falls before 5 seconds  3 = No effort against gravity  4 = No movement  UN = Amputation or joint fusion, explain:    8	Sensory	  0 = Normal  1 = Abnormal    9	Language	  0 = Normal  1 = Mild aphasia  2 = Severe aphasia  3 = Mute or global aphasia    10    Dysarthria  0 = normal articulation   1 = mild to moderate slurring of words   2 = near to unintelligable or worse   X = intubated or other physical barrier     11	Neglect	  0 = Normal  1 = Mild  2 = Severe          PAST MEDICAL & SURGICAL HISTORY:  HTN (hypertension)      SOCIAL HISTORY: + - EtOH, + - tobacco, + - drugs    FAMILY HISTORY:          ROS:  CONSTITUTIONAL: No fever, weight loss, or fatigue  EYES: No eye pain, visual disturbances, or discharge  ENMT:  No difficulty hearing, tinnitus, vertigo; No sinus or throat pain  NECK: No pain or stiffness  RESPIRATORY: No cough, wheezing, chills or hemoptysis; No shortness of breath  CARDIOVASCULAR: No chest pain, palpitations, dizziness, or leg swelling  GASTROINTESTINAL: No abdominal or epigastric pain. No nausea, vomiting, or hematemesis; No diarrhea or constipation. No melena or hematochezia.  GENITOURINARY: No dysuria, frequency, hematuria, or incontinence  NEUROLOGICAL: No headaches, memory loss, loss of strength, numbness, or tremors  SKIN: No itching, burning, rashes, or lesions   MUSCULOSKELETAL: No joint pain or swelling; No muscle, back, or extremity pain        MEDICATIONS  (STANDING):  ondansetron Injectable 4 milliGRAM(s) IV Push once    MEDICATIONS  (PRN):    Allergies    No Known Allergies    Intolerances      Vital Signs Last 24 Hrs  T(C): 37 (16 Mar 2019 02:12), Max: 37 (15 Mar 2019 20:14)  T(F): 98.6 (16 Mar 2019 02:12), Max: 98.6 (15 Mar 2019 20:14)  HR: 78 (16 Mar 2019 02:12) (78 - 82)  BP: 158/94 (16 Mar 2019 02:12) (154/96 - 158/94)  BP(mean): 116 (16 Mar 2019 02:12) (116 - 116)  RR: 18 (16 Mar 2019 02:12) (18 - 18)  SpO2: 96% (16 Mar 2019 02:12) (96% - 97%)  ICP Min - Max:   CPP:  MAP:   BMI:     PHYSICAL EXAM:  GENERAL: NAD, well-groomed, well-developed, AAOx3 and very cooperative  HEAD:  Atraumatic, Normocephalic, no palpable step-off appreciated on palpation  EYES: b/l EOMI, PERRL, conjunctiva and sclera clear,   NECK: Supple, nontender to palpation  + FROM w no muscular soreness reported, neg B/B/K signs.  TS/LS: nontender to palpation midline or paraspinal muscles b/l, no pinpoint tenderness appreciated or paraspinal mm tenderness elicited on palpation b/l   NERVOUS SYSTEM:  Alert & Oriented X3, speech is clear and fluent, no dysarthria appreciated. Good concentration & very cooperative; Motor Strength 5/5 B/L upper and lower extremities, sensory is at baseline and symmetric b/l; No pronators or ankle clonus appreciated b/l, cerebellar signs grossly intact b/l. CN II-XII grossly intact b/l and symmetric; Spurling's test negative b/l; no acevedo's b/l appreciated.   EXTREMITIES:  2+ Peripheral Pulses, No clubbing, cyanosis, or edema, Hawkin's test negative b/l.   CHEST/LUNG: Clear to auscultation bilaterally; No rales, rhonchi, wheezing, or rubs  HEART: Regular rate and rhythm; +S1 & +S2  ABDOMEN: Soft, Nontender, Nondistended; Bowel sounds present  LYMPH: No lymphadenopathy noted  SKIN: No rashes or lesions                          15.4   12.5  )-----------( 210      ( 15 Mar 2019 23:05 )             42.9     03-15    144  |  106  |  16.0  ----------------------------<  117<H>  4.8   |  27.0  |  1.07    Ca    9.6      15 Mar 2019 23:05    TPro  7.3  /  Alb  4.5  /  TBili  0.5  /  DBili  x   /  AST  21  /  ALT  45<H>  /  AlkPhos  99  03-15    PT/INR - ( 15 Mar 2019 23:05 )   PT: 12.0 sec;   INR: 1.04 ratio         PTT - ( 15 Mar 2019 23:05 )  PTT:31.0 sec    LIVER FUNCTIONS - ( 15 Mar 2019 23:05 )  Alb: 4.5 g/dL / Pro: 7.3 g/dL / ALK PHOS: 99 U/L / ALT: 45 U/L / AST: 21 U/L / GGT: x           CSF:         RADIOLOGY & ADDITIONAL STUDIES:  no new NSx images available for review       Time Spent with patient/ education: Patient is a 44y old  Male who presents with a chief complaint of 10/10 HA on monday & flu like symptoms.       HPI: pt is a 44 y.o. Left  handed male seen in ED w wife at bedside c/o transient left facial numbness, 2/10 HA at this time and bifrontal pressure along w nuchal rigidity and photophobia, dizziness and N & V >5x since Monday evening w improvement.   Vomiting resolved on Tuesday.  pt states he took ES Excedrin/ Ibuprofen 800 PRN from urgent care center as pt was seen and diagnose w flu A/ tamiflu was prescribed on Tueaday.    Per wife pt has been followed by cardio for BP management as his DBP was elevated over the last year, also pt has lifted a bed on Sunday, and since monday has not been himself & tired   pt denied use of ASA/ other AC   denied sickle-cell disease      - LOC   - trauma   pt c/o + headache  2/10 on the pain scale   + Nausea / - Vomiting today   Left hand dominant   denies new/ worsening weakness  denies new/ worsening paresthesias numbness   denies  new/ worsening visual changes  denies C- Spine pain, + nuchal rigidity   denies T/LS  Spine pain,    denies Bowel/ Bladder dysfunction   last seen normal as per family Monday day,       atraumatic SAH - [facility]: 2      NIHSS: total score: 0    1a LOC  0=alert     1b	LOC Questions	  0 = Answers both correctly.    1c	LOC Commands	  0 = Performs both tasks correctly.    2	Gaze	  0 = Normal.    0 = No visual loss.    5a	Left Arm Motor	  0 = No drift    5b	Right Arm Motor	  0 = No drift    6a	Left Leg Motor	  0 = No drift    6b	Right Leg Motor	  0 = No drift    8	Sensory	  0 = Normal    9	Language	  0 = Normal    10    Dysarthria  0 = normal articulation     11	Neglect	  0 = Normal        PAST MEDICAL & SURGICAL HISTORY:  HTN (hypertension)        SOCIAL HISTORY: - EtOH,  - tobacco,  - drugs, no SI     FAMILY HISTORY:  father HTN/ AF  mother HTN           ROS:  CONSTITUTIONAL: No fever, weight loss, + fatigue  EYES: No eye pain, visual disturbances, or discharge  ENMT:  No difficulty hearing, tinnitus, vertigo; No sinus or throat pain  NECK: No pain, + stiffness  RESPIRATORY: No cough, wheezing, chills or hemoptysis; No shortness of breath  CARDIOVASCULAR: No chest pain, palpitations, dizziness, or leg swelling  GASTROINTESTINAL: No abdominal or epigastric pain. No hematemesis; No diarrhea or constipation. No melena or hematochezia.  GENITOURINARY: No dysuria, frequency, hematuria, or incontinence  NEUROLOGICAL: No memory loss, loss of strength, numbness, or tremors, + left facial numbness   SKIN: No itching, burning, rashes, or lesions, upper chest blotches as per wife on Friday tat have resolved  MUSCULOSKELETAL: No joint pain or swelling; No muscle, back, or extremity pain      HOME MEDs:  amlodipine 5mg daily   Losartan 100mg daily   Excedrin ES  musinex       	  MEDICATIONS  (STANDING):  ondansetron Injectable 4 milliGRAM(s) IV Push once    MEDICATIONS  (PRN):    Allergies  No Known Allergies    Intolerances      Vital Signs Last 24 Hrs  T(C): 37 (16 Mar 2019 02:12), Max: 37 (15 Mar 2019 20:14)  T(F): 98.6 (16 Mar 2019 02:12), Max: 98.6 (15 Mar 2019 20:14)  HR: 78 (16 Mar 2019 02:12) (78 - 82)  BP: 158/94 (16 Mar 2019 02:12) (154/96 - 158/94)  BP(mean): 116 (16 Mar 2019 02:12) (116 - 116)  RR: 18 (16 Mar 2019 02:12) (18 - 18)  SpO2: 96% (16 Mar 2019 02:12) (96% - 97%)    BMI: 31.6      PHYSICAL EXAM:  GENERAL: NAD, well-groomed, well-developed, AAOx3 and very cooperative  HEAD:  Atraumatic, Normocephalic, no palpable step-off appreciated on palpation  EYES: b/l EOMI, PERRL, conjunctiva and sclera clear,   NECK: Supple, upper cervical tenderness to palpation, + FROM w no muscular soreness reported, + nuchal rigidity   TS/LS: nontender to palpation midline or paraspinal muscles b/l, no pinpoint tenderness appreciated    NERVOUS SYSTEM:  Alert & Oriented X5, speech is clear and fluent, no dysarthria appreciated. Good concentration & very cooperative; Motor Strength 5/5 B/L upper and lower extremities, sensory is at baseline and symmetric b/l; No pronators or ankle clonus appreciated b/l, cerebellar signs grossly intact b/l. CN II-XII grossly intact b/l and symmetric; no acevedo's b/l appreciated.   EXTREMITIES:  2+ Peripheral Pulses, No clubbing, cyanosis, or edema,   HEART: Regular rate and rhythm; +S1 & +S2  ABDOMEN: Soft, Nontender, Nondistended;   SKIN: No rashes or lesions appreciated                           15.4   12.5  )-----------( 210      ( 15 Mar 2019 23:05 )             42.9     03-15    144  |  106  |  16.0  ----------------------------<  117<H>  4.8   |  27.0  |  1.07    Ca    9.6      15 Mar 2019 23:05    TPro  7.3  /  Alb  4.5  /  TBili  0.5  /  DBili  x   /  AST  21  /  ALT  45<H>  /  AlkPhos  99  03-15    PT/INR - ( 15 Mar 2019 23:05 )   PT: 12.0 sec;   INR: 1.04 ratio    PTT - ( 15 Mar 2019 23:05 )  PTT:31.0 sec    LIVER FUNCTIONS - ( 15 Mar 2019 23:05 )  Alb: 4.5 g/dL / Pro: 7.3 g/dL / ALK PHOS: 99 U/L / ALT: 45 U/L / AST: 21 U/L / GGT: x               RADIOLOGY & ADDITIONAL STUDIES:  < from: CT Angio Head w/ IV Cont (03.16.19 @ 04:33) >  EXAM:  CT ANGIO BRAIN (W)AW IC                          PROCEDURE DATE:  03/16/2019    INTERPRETATION:  CT ANGIOGRAM OF THE HEAD AND NECK DATED 3/16/2019.  CLINICAL INFORMATION:  Intraparenchymal and subarachnoid hemorrhage..  TECHNIQUE:After a bolus of IV contrast is administered, a CT angiogram of the vertebral and carotid arterial vasculature from the skull base to the skull vertex is performed. Images are reformatted in sagittal and coronal planes. Maximum intensity projection images are obtained..  93cc of Omnipaque 350 was administered without event.      The study is correlated with the noncontrast CT of the head from 3/16/2019 at 3:24 AM.    FINDINGS:    CT BRAIN:    There is again noted a parenchymal hemorrhage measuring up to 1.1 x 1.1 cm in the posterior left basal ganglia at the junction of the posterior limb of the internal capsule with mild surrounding perihematoma edema.   There is in addition subarachnoid hemorrhage within the left sylvian fissure and left sylvian cistern which is likely due to rupture of the hemorrhage into the subarachnoid space. There is trace high left parietal subarachnoid hemorrhage. There is no shift of the midline or central herniation. The ventricles are notdilated.    The regional soft tissues and osseous structures are unremarkable. The visualized paranasal sinuses and tympanic/mastoid cavities are clear apart from minimal ethmoid mucosal thickening.     CT ANGIOGRAM:    There is venous contamination on CTA.    The skull base and intracranial carotid arteries are patent, however, there is diminution of the left internal carotid artery in comparison to the right. There is tapering of the supraclinoid left ICA up to the carotid terminus with lack of visualization of the left M1 segment. There are diminutive left M2 branch is seen in the sylvian cistern and overall diminutive cortical branches in the left MCA distribution in comparison to the right. At the level of the left carotid terminus, there is serpiginous high density which could represent collateralization to the lenticulostriates. The proximal right MCA is patent without significant stenosis. The anterior cerebral arteries are patent without significant stenosis; there is supply to the left A2 segment through the anterior communicating artery. There is serpiginous tangle of vessels adjacent to the left anterior cerebral artery along the falx which also may represent collateralization.    The skull base and intradural vertebral arteries and basilar artery are patent. The vertebral arteries are codominant. The proximal PCAs are patent without significant stenosis. The left posterior communicating artery is visualized.    There is no evidence of an intracranial arterial aneurysm on CTA.    IMPRESSION:  CT BRAIN: Parenchymal hemorrhage in the left posterior basal ganglia at the junction of the posterior limb of the internal capsule measuring 1.1 x 1.1 cm with surrounding perihematoma edema. Subarachnoid hemorrhage in the left sylvian fissure and left sylvian cistern likely due to rupture of the hematoma into the subarachnoid space. Trace high left parietal subarachnoid hemorrhage.    CTA HEAD: Diminution of the left internalcarotid artery in comparison to the right with tapering of the supraclinoid left ICA up to the carotid terminus with lack of visualization of the left M1 segment. Serpiginous high density at the level of the left carotid terminus which could represent collateralization to lenticulostriates. Diminutive left M2 branches seen in the sylvian cistern and overall diminutive cortical branches of the left MCA distribution in comparison to the right. Left A1 segment supplied by the anterior communicating artery. Serpiginous tangle of vessels adjacent to the left anterior cerebral artery along the falx which also may represent collateralization. Overall, the constellation of findings is concerning for moyamoya disease. Further evaluation with diagnostic cerebral angiogram and MRI is recommended.    JOSSELINE MARTIN D.O.; ATTENDING RADIOLOGIST  This document has been electronically signed. Mar 16 2019  5:00AM  < end of copied text >      < from: CT Head No Cont (03.16.19 @ 03:33) >  EXAM:  CT BRAIN                          PROCEDURE DATE:  03/16/2019    INTERPRETATION:  CLINICAL INFORMATION: Headache.    TECHNIQUE:  Axial CT images were acquired through the head.  Intravenous contrast: None  Two-dimensional reformats were generated.    COMPARISON STUDY: None    FINDINGS:   There is an acute intraparenchymal hemorrhage in the ventral left parietal lobe that measures approximately 1.9 x 1 x 1 cm in greatest orthogonal dimensions (2:27, 5:19 and 4:43).  This appears to have ruptured into the subarachnoid space.  There is mild subarachnoid hemorrhage in the left sylvian fissure.  There is trace subarachnoid hemorrhage in the left frontal convexity and high left parietal region.    There is no midline shift, central herniation, hydrocephalus or evidence of acute territorial infarct.  The ventricles and sulci are normal in size.    The visualized paranasal sinuses are clear.  The mastoid air cells and middle ear cavities are clear.  The calvarium, skull base, and extracranial soft tissues are unremarkable.    CRITICAL VALUE: I discussed the major findings in this report with HARSHA Sylvester in the emergency department on 03/16/2019 at 3:50 AM critical value policy of the hospital was followed. Read back and confirmation of receipt of this communication was performed. This verbal communication supplements the text report of this document.     IMPRESSION:   Acute intraparenchymal hemorrhage in the ventral left parietal lobe measuring 1.9 x 1 x 1 cm appears to have ruptured into the subarachnoid space.  There is mild subarachnoid hemorrhage in the left sylvian fissure and trace subarachnoid hemorrhage in the left frontal and parietal regions.  Follow-up CT is recommended in 6 hour to exclude worsening hemorrhage.    CAROL ANN PEARCE M.D.,ATTENDING RADIOLOGIST  This document has been electronically signed. Mar 16 2019  4:02AM  < end of copied text >      Time Spent with patient/ education: 60 minutes Patient is a 44y old  Male who presents with a chief complaint of 10/10 HA on monday & flu like symptoms.       HPI: pt is a 44 y.o. Left  handed male seen in ED w wife at bedside c/o transient left facial numbness, 2/10 HA at this time and bifrontal pressure along w nuchal rigidity and photophobia, dizziness and N & V >5x since Monday evening w improvement.   Vomiting resolved on Tuesday.  pt states he took ES Excedrin/ Ibuprofen 800 PRN from urgent care center as pt was seen and diagnose w flu A/ tamiflu was prescribed on Tueaday.    Per wife pt has been followed by cardio for BP management as his DBP was elevated over the last year, also pt has lifted a bed on Sunday, and since monday has not been himself & tired   pt denied use of ASA/ other AC   denied sickle-cell disease      - LOC   - trauma   pt c/o + headache  2/10 on the pain scale   + Nausea / - Vomiting today   Left hand dominant   denies new/ worsening weakness  denies new/ worsening paresthesias numbness   denies  new/ worsening visual changes  denies C- Spine pain, + nuchal rigidity   denies T/LS  Spine pain,    denies Bowel/ Bladder dysfunction   last seen normal as per family Monday day,   last PO intake 3 PM 3.15.19      atraumatic SAH - [facility]elario & Hall: 2      NIHSS: total score: 0    1a LOC  0=alert     1b	LOC Questions	  0 = Answers both correctly.    1c	LOC Commands	  0 = Performs both tasks correctly.    2	Gaze	  0 = Normal.    0 = No visual loss.    5a	Left Arm Motor	  0 = No drift    5b	Right Arm Motor	  0 = No drift    6a	Left Leg Motor	  0 = No drift    6b	Right Leg Motor	  0 = No drift    8	Sensory	  0 = Normal    9	Language	  0 = Normal    10    Dysarthria  0 = normal articulation     11	Neglect	  0 = Normal        PAST MEDICAL & SURGICAL HISTORY:  HTN (hypertension)        SOCIAL HISTORY: - EtOH,  - tobacco,  - drugs, no SI     FAMILY HISTORY:  father HTN/ AF  mother HTN           ROS:  CONSTITUTIONAL: No fever, weight loss, + fatigue  EYES: No eye pain, visual disturbances, or discharge  ENMT:  No difficulty hearing, tinnitus, vertigo; No sinus or throat pain  NECK: No pain, + stiffness  RESPIRATORY: No cough, wheezing, chills or hemoptysis; No shortness of breath  CARDIOVASCULAR: No chest pain, palpitations, dizziness, or leg swelling  GASTROINTESTINAL: No abdominal or epigastric pain. No hematemesis; No diarrhea or constipation. No melena or hematochezia.  GENITOURINARY: No dysuria, frequency, hematuria, or incontinence  NEUROLOGICAL: No memory loss, loss of strength, numbness, or tremors, + left facial numbness   SKIN: No itching, burning, rashes, or lesions, upper chest blotches as per wife on Friday tat have resolved  MUSCULOSKELETAL: No joint pain or swelling; No muscle, back, or extremity pain      HOME MEDs:  amlodipine 5mg daily   Losartan 100mg daily   Excedrin ES  musinex       	  MEDICATIONS  (STANDING):  ondansetron Injectable 4 milliGRAM(s) IV Push once    MEDICATIONS  (PRN):    Allergies  No Known Allergies    Intolerances      Vital Signs Last 24 Hrs  T(C): 37 (16 Mar 2019 02:12), Max: 37 (15 Mar 2019 20:14)  T(F): 98.6 (16 Mar 2019 02:12), Max: 98.6 (15 Mar 2019 20:14)  HR: 78 (16 Mar 2019 02:12) (78 - 82)  BP: 158/94 (16 Mar 2019 02:12) (154/96 - 158/94)  BP(mean): 116 (16 Mar 2019 02:12) (116 - 116)  RR: 18 (16 Mar 2019 02:12) (18 - 18)  SpO2: 96% (16 Mar 2019 02:12) (96% - 97%)    BMI: 31.6      PHYSICAL EXAM:  GENERAL: NAD, well-groomed, well-developed, AAOx3 and very cooperative  HEAD:  Atraumatic, Normocephalic, no palpable step-off appreciated on palpation  EYES: b/l EOMI, PERRL, conjunctiva and sclera clear,   NECK: Supple, upper cervical tenderness to palpation, + FROM w no muscular soreness reported, + nuchal rigidity   TS/LS: nontender to palpation midline or paraspinal muscles b/l, no pinpoint tenderness appreciated    NERVOUS SYSTEM:  Alert & Oriented X5, speech is clear and fluent, no dysarthria appreciated. Good concentration & very cooperative; Motor Strength 5/5 B/L upper and lower extremities, sensory is at baseline and symmetric b/l; No pronators or ankle clonus appreciated b/l, cerebellar signs grossly intact b/l. CN II-XII grossly intact b/l and symmetric; no acevedo's b/l appreciated.   EXTREMITIES:  2+ Peripheral Pulses, No clubbing, cyanosis, or edema,   HEART: Regular rate and rhythm; +S1 & +S2  ABDOMEN: Soft, Nontender, Nondistended;   SKIN: No rashes or lesions appreciated                           15.4   12.5  )-----------( 210      ( 15 Mar 2019 23:05 )             42.9     03-15    144  |  106  |  16.0  ----------------------------<  117<H>  4.8   |  27.0  |  1.07    Ca    9.6      15 Mar 2019 23:05    TPro  7.3  /  Alb  4.5  /  TBili  0.5  /  DBili  x   /  AST  21  /  ALT  45<H>  /  AlkPhos  99  03-15    PT/INR - ( 15 Mar 2019 23:05 )   PT: 12.0 sec;   INR: 1.04 ratio    PTT - ( 15 Mar 2019 23:05 )  PTT:31.0 sec    LIVER FUNCTIONS - ( 15 Mar 2019 23:05 )  Alb: 4.5 g/dL / Pro: 7.3 g/dL / ALK PHOS: 99 U/L / ALT: 45 U/L / AST: 21 U/L / GGT: x               RADIOLOGY & ADDITIONAL STUDIES:  < from: CT Angio Head w/ IV Cont (03.16.19 @ 04:33) >  EXAM:  CT ANGIO BRAIN (W)AW IC                          PROCEDURE DATE:  03/16/2019    INTERPRETATION:  CT ANGIOGRAM OF THE HEAD AND NECK DATED 3/16/2019.  CLINICAL INFORMATION:  Intraparenchymal and subarachnoid hemorrhage..  TECHNIQUE:After a bolus of IV contrast is administered, a CT angiogram of the vertebral and carotid arterial vasculature from the skull base to the skull vertex is performed. Images are reformatted in sagittal and coronal planes. Maximum intensity projection images are obtained..  93cc of Omnipaque 350 was administered without event.      The study is correlated with the noncontrast CT of the head from 3/16/2019 at 3:24 AM.    FINDINGS:    CT BRAIN:    There is again noted a parenchymal hemorrhage measuring up to 1.1 x 1.1 cm in the posterior left basal ganglia at the junction of the posterior limb of the internal capsule with mild surrounding perihematoma edema.   There is in addition subarachnoid hemorrhage within the left sylvian fissure and left sylvian cistern which is likely due to rupture of the hemorrhage into the subarachnoid space. There is trace high left parietal subarachnoid hemorrhage. There is no shift of the midline or central herniation. The ventricles are notdilated.    The regional soft tissues and osseous structures are unremarkable. The visualized paranasal sinuses and tympanic/mastoid cavities are clear apart from minimal ethmoid mucosal thickening.     CT ANGIOGRAM:    There is venous contamination on CTA.    The skull base and intracranial carotid arteries are patent, however, there is diminution of the left internal carotid artery in comparison to the right. There is tapering of the supraclinoid left ICA up to the carotid terminus with lack of visualization of the left M1 segment. There are diminutive left M2 branch is seen in the sylvian cistern and overall diminutive cortical branches in the left MCA distribution in comparison to the right. At the level of the left carotid terminus, there is serpiginous high density which could represent collateralization to the lenticulostriates. The proximal right MCA is patent without significant stenosis. The anterior cerebral arteries are patent without significant stenosis; there is supply to the left A2 segment through the anterior communicating artery. There is serpiginous tangle of vessels adjacent to the left anterior cerebral artery along the falx which also may represent collateralization.    The skull base and intradural vertebral arteries and basilar artery are patent. The vertebral arteries are codominant. The proximal PCAs are patent without significant stenosis. The left posterior communicating artery is visualized.    There is no evidence of an intracranial arterial aneurysm on CTA.    IMPRESSION:  CT BRAIN: Parenchymal hemorrhage in the left posterior basal ganglia at the junction of the posterior limb of the internal capsule measuring 1.1 x 1.1 cm with surrounding perihematoma edema. Subarachnoid hemorrhage in the left sylvian fissure and left sylvian cistern likely due to rupture of the hematoma into the subarachnoid space. Trace high left parietal subarachnoid hemorrhage.    CTA HEAD: Diminution of the left internalcarotid artery in comparison to the right with tapering of the supraclinoid left ICA up to the carotid terminus with lack of visualization of the left M1 segment. Serpiginous high density at the level of the left carotid terminus which could represent collateralization to lenticulostriates. Diminutive left M2 branches seen in the sylvian cistern and overall diminutive cortical branches of the left MCA distribution in comparison to the right. Left A1 segment supplied by the anterior communicating artery. Serpiginous tangle of vessels adjacent to the left anterior cerebral artery along the falx which also may represent collateralization. Overall, the constellation of findings is concerning for moyamoya disease. Further evaluation with diagnostic cerebral angiogram and MRI is recommended.    JOSSELINE MARTIN D.O.; ATTENDING RADIOLOGIST  This document has been electronically signed. Mar 16 2019  5:00AM  < end of copied text >      < from: CT Head No Cont (03.16.19 @ 03:33) >  EXAM:  CT BRAIN                          PROCEDURE DATE:  03/16/2019    INTERPRETATION:  CLINICAL INFORMATION: Headache.    TECHNIQUE:  Axial CT images were acquired through the head.  Intravenous contrast: None  Two-dimensional reformats were generated.    COMPARISON STUDY: None    FINDINGS:   There is an acute intraparenchymal hemorrhage in the ventral left parietal lobe that measures approximately 1.9 x 1 x 1 cm in greatest orthogonal dimensions (2:27, 5:19 and 4:43).  This appears to have ruptured into the subarachnoid space.  There is mild subarachnoid hemorrhage in the left sylvian fissure.  There is trace subarachnoid hemorrhage in the left frontal convexity and high left parietal region.    There is no midline shift, central herniation, hydrocephalus or evidence of acute territorial infarct.  The ventricles and sulci are normal in size.    The visualized paranasal sinuses are clear.  The mastoid air cells and middle ear cavities are clear.  The calvarium, skull base, and extracranial soft tissues are unremarkable.    CRITICAL VALUE: I discussed the major findings in this report with HARSHA Sylvester in the emergency department on 03/16/2019 at 3:50 AM critical value policy of the hospital was followed. Read back and confirmation of receipt of this communication was performed. This verbal communication supplements the text report of this document.     IMPRESSION:   Acute intraparenchymal hemorrhage in the ventral left parietal lobe measuring 1.9 x 1 x 1 cm appears to have ruptured into the subarachnoid space.  There is mild subarachnoid hemorrhage in the left sylvian fissure and trace subarachnoid hemorrhage in the left frontal and parietal regions.  Follow-up CT is recommended in 6 hour to exclude worsening hemorrhage.    CAROL ANN PEARCE M.D.,ATTENDING RADIOLOGIST  This document has been electronically signed. Mar 16 2019  4:02AM  < end of copied text >      Time Spent with patient/ education: 60 minutes

## 2019-03-16 NOTE — CONSULT NOTE ADULT - ASSESSMENT
This is a 45 yo M w/ hx of HTN who p/w small L IC IPH w/ no mass effect or shift. Patient is neurologically intact on exam. Outside report shows concern for Moyamoya, however patient iwll need further workup here.    no acute neurosurgical intervention at this time  q4 neuro checks  SBP<160  MRI/MRA brain  likely plan for angio on this admission This is a 43 yo M w/ hx of HTN who p/w small L posterior external capsule/insular IPH w/ no mass effect or shift. Patient is neurologically intact on exam. Outside report shows concern for Moyamoya, however patient iwll need further workup here.    no acute neurosurgical intervention at this time  q4 neuro checks  SBP<160  MRI/MRA brain  likely plan for angio on this admission This is a 45 yo M w/ hx of HTN who p/w small L posterior external capsule/insular IPH w/ no mass effect or shift. Patient is neurologically intact on exam. 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 Patient will need further imaging prior to treatment consideration.    no acute neurosurgical intervention at this time  q4 neuro checks  SBP<160  MRI/MRA NOVA brain  likely plan for angio on this admission

## 2019-03-17 PROBLEM — I10 ESSENTIAL (PRIMARY) HYPERTENSION: Chronic | Status: ACTIVE | Noted: 2019-03-16

## 2019-03-17 LAB
ANION GAP SERPL CALC-SCNC: 15 MMOL/L — SIGNIFICANT CHANGE UP (ref 5–17)
BUN SERPL-MCNC: 15 MG/DL — SIGNIFICANT CHANGE UP (ref 7–23)
CALCIUM SERPL-MCNC: 9.5 MG/DL — SIGNIFICANT CHANGE UP (ref 8.4–10.5)
CHLORIDE SERPL-SCNC: 102 MMOL/L — SIGNIFICANT CHANGE UP (ref 96–108)
CHOLEST SERPL-MCNC: 144 MG/DL — SIGNIFICANT CHANGE UP (ref 10–199)
CO2 SERPL-SCNC: 20 MMOL/L — LOW (ref 22–31)
CREAT SERPL-MCNC: 1.04 MG/DL — SIGNIFICANT CHANGE UP (ref 0.5–1.3)
CRP SERPL-MCNC: 0.39 MG/DL — SIGNIFICANT CHANGE UP (ref 0–0.4)
ERYTHROCYTE [SEDIMENTATION RATE] IN BLOOD: 7 MM/HR — SIGNIFICANT CHANGE UP (ref 0–15)
GLUCOSE SERPL-MCNC: 104 MG/DL — HIGH (ref 70–99)
HBA1C BLD-MCNC: 4.6 % — SIGNIFICANT CHANGE UP (ref 4–5.6)
HCT VFR BLD CALC: 44.4 % — SIGNIFICANT CHANGE UP (ref 39–50)
HDLC SERPL-MCNC: 38 MG/DL — LOW
HGB BLD-MCNC: 15.6 G/DL — SIGNIFICANT CHANGE UP (ref 13–17)
LIPID PNL WITH DIRECT LDL SERPL: 85 MG/DL — SIGNIFICANT CHANGE UP
MCHC RBC-ENTMCNC: 31.4 PG — SIGNIFICANT CHANGE UP (ref 27–34)
MCHC RBC-ENTMCNC: 35.1 GM/DL — SIGNIFICANT CHANGE UP (ref 32–36)
MCV RBC AUTO: 89.2 FL — SIGNIFICANT CHANGE UP (ref 80–100)
PLATELET # BLD AUTO: 226 K/UL — SIGNIFICANT CHANGE UP (ref 150–400)
POTASSIUM SERPL-MCNC: 4.1 MMOL/L — SIGNIFICANT CHANGE UP (ref 3.5–5.3)
POTASSIUM SERPL-SCNC: 4.1 MMOL/L — SIGNIFICANT CHANGE UP (ref 3.5–5.3)
RBC # BLD: 4.98 M/UL — SIGNIFICANT CHANGE UP (ref 4.2–5.8)
RBC # FLD: 12.4 % — SIGNIFICANT CHANGE UP (ref 10.3–14.5)
SODIUM SERPL-SCNC: 137 MMOL/L — SIGNIFICANT CHANGE UP (ref 135–145)
TOTAL CHOLESTEROL/HDL RATIO MEASUREMENT: 3.8 RATIO — SIGNIFICANT CHANGE UP (ref 3.4–9.6)
TRIGL SERPL-MCNC: 106 MG/DL — SIGNIFICANT CHANGE UP (ref 10–149)
WBC # BLD: 13.7 K/UL — HIGH (ref 3.8–10.5)
WBC # FLD AUTO: 13.7 K/UL — HIGH (ref 3.8–10.5)

## 2019-03-17 PROCEDURE — 70450 CT HEAD/BRAIN W/O DYE: CPT | Mod: 26

## 2019-03-17 PROCEDURE — 93306 TTE W/DOPPLER COMPLETE: CPT | Mod: 26

## 2019-03-17 RX ORDER — HYDRALAZINE HCL 50 MG
25 TABLET ORAL EVERY 8 HOURS
Qty: 0 | Refills: 0 | Status: DISCONTINUED | OUTPATIENT
Start: 2019-03-17 | End: 2019-03-19

## 2019-03-17 RX ORDER — AMLODIPINE BESYLATE 2.5 MG/1
5 TABLET ORAL DAILY
Qty: 0 | Refills: 0 | Status: DISCONTINUED | OUTPATIENT
Start: 2019-03-17 | End: 2019-03-17

## 2019-03-17 RX ORDER — AMLODIPINE BESYLATE 2.5 MG/1
10 TABLET ORAL DAILY
Qty: 0 | Refills: 0 | Status: DISCONTINUED | OUTPATIENT
Start: 2019-03-17 | End: 2019-03-21

## 2019-03-17 RX ORDER — ONDANSETRON 8 MG/1
4 TABLET, FILM COATED ORAL EVERY 6 HOURS
Qty: 0 | Refills: 0 | Status: DISCONTINUED | OUTPATIENT
Start: 2019-03-17 | End: 2019-03-21

## 2019-03-17 RX ORDER — LOSARTAN POTASSIUM 100 MG/1
100 TABLET, FILM COATED ORAL DAILY
Qty: 0 | Refills: 0 | Status: DISCONTINUED | OUTPATIENT
Start: 2019-03-17 | End: 2019-03-21

## 2019-03-17 RX ORDER — HYDROMORPHONE HYDROCHLORIDE 2 MG/ML
1 INJECTION INTRAMUSCULAR; INTRAVENOUS; SUBCUTANEOUS EVERY 4 HOURS
Qty: 0 | Refills: 0 | Status: DISCONTINUED | OUTPATIENT
Start: 2019-03-17 | End: 2019-03-17

## 2019-03-17 RX ORDER — HYDRALAZINE HCL 50 MG
5 TABLET ORAL EVERY 6 HOURS
Qty: 0 | Refills: 0 | Status: DISCONTINUED | OUTPATIENT
Start: 2019-03-17 | End: 2019-03-21

## 2019-03-17 RX ORDER — HYDROMORPHONE HYDROCHLORIDE 2 MG/ML
1 INJECTION INTRAMUSCULAR; INTRAVENOUS; SUBCUTANEOUS EVERY 4 HOURS
Qty: 0 | Refills: 0 | Status: DISCONTINUED | OUTPATIENT
Start: 2019-03-17 | End: 2019-03-18

## 2019-03-17 RX ORDER — ACETAMINOPHEN 500 MG
1000 TABLET ORAL ONCE
Qty: 0 | Refills: 0 | Status: COMPLETED | OUTPATIENT
Start: 2019-03-17 | End: 2019-03-17

## 2019-03-17 RX ORDER — ONDANSETRON 8 MG/1
4 TABLET, FILM COATED ORAL ONCE
Qty: 0 | Refills: 0 | Status: COMPLETED | OUTPATIENT
Start: 2019-03-17 | End: 2019-03-17

## 2019-03-17 RX ORDER — SODIUM CHLORIDE 9 MG/ML
1000 INJECTION INTRAMUSCULAR; INTRAVENOUS; SUBCUTANEOUS
Qty: 0 | Refills: 0 | Status: COMPLETED | OUTPATIENT
Start: 2019-03-17 | End: 2019-03-17

## 2019-03-17 RX ADMIN — SODIUM CHLORIDE 60 MILLILITER(S): 9 INJECTION INTRAMUSCULAR; INTRAVENOUS; SUBCUTANEOUS at 17:24

## 2019-03-17 RX ADMIN — Medication 1000 MILLIGRAM(S): at 20:39

## 2019-03-17 RX ADMIN — LOSARTAN POTASSIUM 100 MILLIGRAM(S): 100 TABLET, FILM COATED ORAL at 11:59

## 2019-03-17 RX ADMIN — HYDROMORPHONE HYDROCHLORIDE 1 MILLIGRAM(S): 2 INJECTION INTRAMUSCULAR; INTRAVENOUS; SUBCUTANEOUS at 12:01

## 2019-03-17 RX ADMIN — Medication 1000 MILLIGRAM(S): at 09:00

## 2019-03-17 RX ADMIN — HYDROMORPHONE HYDROCHLORIDE 1 MILLIGRAM(S): 2 INJECTION INTRAMUSCULAR; INTRAVENOUS; SUBCUTANEOUS at 12:30

## 2019-03-17 RX ADMIN — HYDROMORPHONE HYDROCHLORIDE 1 MILLIGRAM(S): 2 INJECTION INTRAMUSCULAR; INTRAVENOUS; SUBCUTANEOUS at 22:24

## 2019-03-17 RX ADMIN — Medication 25 MILLIGRAM(S): at 13:33

## 2019-03-17 RX ADMIN — ONDANSETRON 4 MILLIGRAM(S): 8 TABLET, FILM COATED ORAL at 07:41

## 2019-03-17 RX ADMIN — Medication 25 MILLIGRAM(S): at 21:47

## 2019-03-17 RX ADMIN — ONDANSETRON 4 MILLIGRAM(S): 8 TABLET, FILM COATED ORAL at 13:32

## 2019-03-17 RX ADMIN — Medication 400 MILLIGRAM(S): at 20:09

## 2019-03-17 RX ADMIN — HYDROMORPHONE HYDROCHLORIDE 1 MILLIGRAM(S): 2 INJECTION INTRAMUSCULAR; INTRAVENOUS; SUBCUTANEOUS at 01:23

## 2019-03-17 RX ADMIN — ONDANSETRON 4 MILLIGRAM(S): 8 TABLET, FILM COATED ORAL at 01:23

## 2019-03-17 RX ADMIN — AMLODIPINE BESYLATE 5 MILLIGRAM(S): 2.5 TABLET ORAL at 08:19

## 2019-03-17 RX ADMIN — Medication 5 MILLIGRAM(S): at 08:18

## 2019-03-17 RX ADMIN — ONDANSETRON 4 MILLIGRAM(S): 8 TABLET, FILM COATED ORAL at 21:54

## 2019-03-17 RX ADMIN — HYDROMORPHONE HYDROCHLORIDE 1 MILLIGRAM(S): 2 INJECTION INTRAMUSCULAR; INTRAVENOUS; SUBCUTANEOUS at 21:54

## 2019-03-17 RX ADMIN — HYDROMORPHONE HYDROCHLORIDE 1 MILLIGRAM(S): 2 INJECTION INTRAMUSCULAR; INTRAVENOUS; SUBCUTANEOUS at 01:40

## 2019-03-17 RX ADMIN — Medication 400 MILLIGRAM(S): at 08:20

## 2019-03-17 NOTE — PROGRESS NOTE ADULT - ASSESSMENT
43yo RH  male PMH HTN tx from OSH, p/w HA. LKW 3/11 2230. Patient developed sudden onset HA(felt like baseball bat to the head) at 2230 on 3/11; 15 mins later he developed intractable N/V.  CT/CTA done showing IPH in L ventral parietal lobe rupturing into subarachnoid space, CTA head w/ findings concerning for Flores Flores. Transferred to [facility] for further workup.  Impression:    NEURO: Continue close monitoring for neurologic deterioration, permissive HTN, titrate statin to LDL goal less than 70, MRI Brain w/o, MRA Head w/o and Neck w/contrast pending. Physical therapy/OT/Speech eval/treatment.     ANTITHROMBOTIC THERAPY: not indicated due to ICH    PULMONARY:  protecting airway, saturating well     CARDIOVASCULAR: check TTE, cardiac monitoring                              SBP goal: <140    GASTROINTESTINAL:  dysphagia screen       Diet: Regular    RENAL: BUN/Cr without acute change, good urine output      Na Goal: Greater than 135     Dorsey: N    HEMATOLOGY: H/H without acute change, Platelets normal, no active signs of bleeding      DVT ppx: Heparin s.c [] LMWH []     ID: afebrile, leukocytosis- no overt si/swx of infection, will continue to monitor     OTHER:     DISPOSITION: Rehab or home depending on PT eval once stable and workup is complete      CORE MEASURES:        Admission NIHSS: 0     TPA: [] YES [x] NO      LDL/HDL: P     Depression Screen:      Statin Therapy:     Dysphagia Screen: [x] PASS [] FAIL     Smoking [] YES [] NO      Afib [] YES [x] NO     Stroke Education [] YES [] NO 43yo RH  male PMH HTN tx from OSH, p/w HA. LKW 3/11 2230. Patient developed sudden onset HA(felt like baseball bat to the head) at 2230 on 3/11; 15 mins later he developed intractable N/V.  CT/CTA done showing IPH in L ventral parietal lobe rupturing into subarachnoid space, CTA head w/ findings concerning for Flores Flores. Transferred to [facility] for further workup.  Impression: L internal capsule/insular IPH with concern of Flores Flores    NEURO: Continue close monitoring for neurologic deterioration and monitor headaches and pain, neurosurgery consulted for possible Flores Flores disease- plan for angiogram during admission, LDL 85- statin not indicated as nonatherosclerotic stroke and LDL at goal, MRI Brain w/o, MRA Head w/o and Neck w/contrast with NOVA pending. Physical therapy/OT eval pending    ANTITHROMBOTIC THERAPY: not indicated due to IPH    PULMONARY:  protecting airway, saturating well     CARDIOVASCULAR: check TTE, cardiac monitoring                              SBP goal: <140    GASTROINTESTINAL:  dysphagia screen- passed, tolerating Regular diet       Diet: Regular    RENAL: BUN/Cr without acute change, good urine output      Na Goal: Greater than 135     Dorsey: N    HEMATOLOGY: H/H without acute change, Platelets normal, no active signs of bleeding      DVT ppx: venodynes    ID: afebrile, leukocytosis- no overt si/sx of infection, will continue to monitor     OTHER: Plan of care discussed with patient and wife at bedside.     DISPOSITION: Rehab or home depending on PT eval once stable and workup is complete      CORE MEASURES:        Admission NIHSS: 0     TPA: [] YES [x] NO      LDL/HDL: 85/38     Depression Screen: 0     Statin Therapy: N     Dysphagia Screen: [x] PASS [] FAIL     Smoking [] YES [x] NO      Afib [] YES [x] NO     Stroke Education [x] YES [] NO

## 2019-03-17 NOTE — CONSULT NOTE ADULT - SUBJECTIVE AND OBJECTIVE BOX
CHIEF COMPLAINT:  ICH     HISTORY OF PRESENT ILLNESS:  43 yo male admitted to the Stroke unit with PMH HTN tx from OSH, p/w HA. LKW 3/11 2230. Patient developed sudden onset HA(felt like baseball bat to the head) at 2230 on 3/11; 15 mins later he developed intractable N/V. Went to Urgent Care 3/12, sent home on tamiflu. HA/nausea/vomiting persisted, so patient went to [facility] ED where CT/CTA done showing IPH in L ventral parietal lobe rupturing into subarachnoid space, CTA head w/ findings concerning for Flores Flores. Transferred to [facility] for further workup. On initial evaluation at [facility], patient HA improved although with photophobia, no focal neurologic deficits, NIHSS 0.   Continues to require Cardene gtt for blood pressure control.   Continues to have severe headache. Denies chest pain or shortness of breath. Previously had seen a cardiologist for BP control but didnt follow up with due to not seeing improvement in his blood pressure.         PAST MEDICAL & SURGICAL HISTORY:  No pertinent past medical history  HTN (hypertension)          MEDICATIONS:  amLODIPine   Tablet 5 milliGRAM(s) Oral daily  hydrALAZINE Injectable 5 milliGRAM(s) IV Push every 6 hours PRN  losartan 100 milliGRAM(s) Oral daily  niCARdipine Infusion 5 mG/Hr IV Continuous <Continuous>        HYDROmorphone  Injectable 1 milliGRAM(s) IV Push every 4 hours  ondansetron Injectable 4 milliGRAM(s) IV Push every 6 hours PRN            FAMILY HISTORY:      SOCIAL HISTORY:    [ ] Non-smoker  [ ] Smoker  [ ] Alcohol    Allergies    No Known Allergies    Intolerances    	    REVIEW OF SYSTEMS:  CONSTITUTIONAL: No fever, weight loss, + fatigue  EYES: No eye pain, visual disturbances, or discharge  ENMT:  No difficulty hearing, tinnitus, vertigo; No sinus or throat pain  NECK: No pain or stiffness  RESPIRATORY: No cough, wheezing, chills or hemoptysis; No Shortness of Breath  CARDIOVASCULAR: No chest pain, palpitations, passing out, dizziness, or leg swelling  GASTROINTESTINAL: No abdominal or epigastric pain. No nausea, vomiting, or hematemesis; No diarrhea or constipation. No melena or hematochezia.  GENITOURINARY: No dysuria, frequency, hematuria, or incontinence  NEUROLOGICAL: +headaches, memory loss, loss of strength, numbness, or tremors  SKIN: No itching, burning, rashes, or lesions   LYMPH Nodes: No enlarged glands  ENDOCRINE: No heat or cold intolerance; No hair loss  MUSCULOSKELETAL: + joint pain or swelling; No muscle, back, or extremity pain  PSYCHIATRIC: No depression, anxiety, mood swings, or difficulty sleeping  HEME/LYMPH: No easy bruising, or bleeding gums  ALLERY AND IMMUNOLOGIC: No hives or eczema	    [ ] All others negative	  [ ] Unable to obtain    PHYSICAL EXAM:  T(C): 36.9 (03-17-19 @ 08:00), Max: 36.9 (03-16-19 @ 12:20)  HR: 69 (03-17-19 @ 10:00) (55 - 76)  BP: 122/62 (03-17-19 @ 10:00) (117/61 - 148/93)  RR: 16 (03-17-19 @ 10:00) (10 - 22)  SpO2: 96% (03-17-19 @ 10:00) (94% - 100%)  Wt(kg): --  I&O's Summary    16 Mar 2019 07:01  -  17 Mar 2019 07:00  --------------------------------------------------------  IN: 313 mL / OUT: 0 mL / NET: 313 mL        Appearance: In distress, + HA	  HEENT:   Normal oral mucosa, PERRL, EOMI	  Lymphatic: No lymphadenopathy  Cardiovascular: Normal S1 S2, No JVD, No murmurs, No edema  Respiratory: Lungs clear to auscultation	  Psychiatry: A & O x 3, Mood & affect appropriate  Gastrointestinal:  Soft, Non-tender, + BS	  Skin: No rashes, No ecchymoses, No cyanosis	  Extremities: Normal range of motion, No clubbing, cyanosis or edema  Vascular: Peripheral pulses palpable 2+ bilaterally  NEUROLOGICAL EXAM:  Mental status: Awake, alert, oriented x3, no aphasia, no neglect, normal memory   Cranial Nerves: No facial asymmetry, no nystagmus, no dysarthria,  tongue midline  Motor exam: Normal tone, no drift, 5/5 RUE, 5/5 RLE, 5/5 LUE, 5/5 LLE, normal fine finger movements.  Sensation: Intact to light touch   Coordination/ Gait: No dysmetria, SHELLIE intact and symmetric bilatera  TELEMETRY: SR	    ECG:  	NSR, RBBB, LVH,  no acute ischemic stt changes   RADIOLOGY:  < from: CT Angio Head w/ IV Cont (03.16.19 @ 04:33) >     EXAM:  CT ANGIO BRAIN (W)AW IC                          PROCEDURE DATE:  03/16/2019          INTERPRETATION:  CT ANGIOGRAM OF THE HEAD AND NECK DATED 3/16/2019.    CLINICAL INFORMATION:  Intraparenchymal and subarachnoid hemorrhage..    TECHNIQUE:After a bolus of IV contrast is administered, a CT angiogram   of the vertebral and carotid arterial vasculature from the skull base to   the skull vertex is performed. Images are reformatted in sagittal and   coronal planes. Maximum intensity projection images are obtained..  93cc   of Omnipaque 350 was administered without event.      The study is correlated with the noncontrast CT of the head from   3/16/2019 at 3:24 AM.    FINDINGS:    CT BRAIN:    There is again noted a parenchymal hemorrhage measuring up to 1.1 x 1.1   cm in the posterior left basal ganglia at the junction of the posterior   limb of the internal capsule with mild surrounding perihematoma edema.   There is in addition subarachnoid hemorrhage within the left sylvian   fissure and left sylvian cistern which is likely due to rupture of the   hemorrhage into the subarachnoid space. There is trace high left parietal   subarachnoid hemorrhage. There is no shift of the midline or central   herniation. The ventricles are notdilated.    The regional soft tissues and osseous structures are unremarkable. The   visualized paranasal sinuses and tympanic/mastoid cavities are clear   apart from minimal ethmoid mucosal thickening.     CT ANGIOGRAM:    There is venous contamination on CTA.    The skull base and intracranial carotid arteries are patent, however,   there is diminution of the left internal carotid artery in comparison to   the right. There is tapering of the supraclinoid left ICA up to the   carotid terminus with lack of visualization of the left M1 segment. There   are diminutive left M2 branch is seen in the sylvian cistern and overall   diminutive cortical branches in the left MCA distribution in comparison   to the right. At the level of the left carotid terminus, there is   serpiginous high density which could represent collateralization to the   lenticulostriates. The proximal right MCA is patent without significant   stenosis. The anterior cerebral arteries are patent without significant   stenosis; there is supply to the left A2 segment through the anterior   communicating artery. There is serpiginous tangle of vessels adjacent to   the left anterior cerebral artery along the falx which also may represent   collateralization.    The skull base and intradural vertebral arteries and basilar artery are   patent. The vertebral arteries are codominant. The proximal PCAs are   patent without significant stenosis. The left posterior communicating   artery is visualized.    There is no evidence of an intracranial arterial aneurysm on CTA.    IMPRESSION:    CT BRAIN: Parenchymal hemorrhage in the left posterior basal ganglia at   the junction of the posterior limb of the internal capsule measuring 1.1   x 1.1 cm with surrounding perihematoma edema. Subarachnoid hemorrhage in   the left sylvian fissure and left sylvian cistern likely due to rupture   of the hematoma into the subarachnoid space. Trace high left parietal   subarachnoid hemorrhage.    CTA HEAD: Diminution of the left internalcarotid artery in comparison to   the right with tapering of the supraclinoid left ICA up to the carotid   terminus with lack of visualization of the left M1 segment. Serpiginous   high density at the level of the left carotid terminus which could   represent collateralization to lenticulostriates. Diminutive left M2   branches seen in the sylvian cistern and overall diminutive cortical   branches of the left MCA distribution in comparison to the right. Left A1   segment supplied by the anterior communicating artery. Serpiginous tangle   of vessels adjacent to the left anterior cerebral artery along the falx   which also may represent collateralization. Overall, the constellation of   findings is concerning for moyamoya disease. Further evaluation with   diagnostic cerebral angiogram and MRI is recommended.    JOSSELINE MARTIN D.O.; ATTENDING RADIOLOGIST  This document has been electronically signed. Mar 16 2019  5:00AM         Rapid Respiratory Viral Panel (03.16.19 @ 13:50)    Rapid RVP Result: NotDete: The [employer] RVP Rapid uses polymerase chain reaction (PCR) and melt  curve analysis to screen for adenovirus; coronavirus HKU1, NL63, 229E,  OC43; human metapneumovirus (hMPV); human enterovirus/rhinovirus  (Entero/RV); influenza A; influenza A/H1;influenza A/H3; influenza  A/H1-2009; influenza B; parainfluenza viruses 1, 2, 3, 4; respiratory  syncytial virus; Bordetella pertussis; Mycoplasma pneumoniae; and  Chlamydophila pneumoniae.      OTHER: 	  	  LABS:	 	    CARDIAC MARKERS:                                  15.6   13.7  )-----------( 226      ( 17 Mar 2019 04:52 )             44.4     03-17    137  |  102  |  15  ----------------------------<  104<H>  4.1   |  20<L>  |  1.04    Ca    9.5      17 Mar 2019 04:54    TPro  6.9  /  Alb  4.7  /  TBili  0.7  /  DBili  x   /  AST  17  /  ALT  42  /  AlkPhos  84  03-16    proBNP:   Lipid Profile:   HgA1c: Hemoglobin A1C, Whole Blood: 4.6 % (03-17 @ 08:50)    TSH:

## 2019-03-17 NOTE — CONSULT NOTE ADULT - ASSESSMENT
43 yo male admitted to the Stroke unit with intracranial hemorrhage in setting of hypertensive emergency   Increase norvasc 10 mg daily   Cont with Losartan 100 mg   Add Hydralazine 25 mg TID   Titrate off cardene gtt   Check TTE   Nsx or Neurology follow up   Outpatient sleep study

## 2019-03-17 NOTE — PROGRESS NOTE ADULT - SUBJECTIVE AND OBJECTIVE BOX
THE PATIENT WAS SEEN AND EXAMINED BY ME WITH THE HOUSESTAFF AND STROKE TEAM DURING MORNING ROUNDS.   HPI:  45yo RH  male PMH HTN tx from OSH, p/w HA. LKW 3/11 2230. Patient developed sudden onset HA(felt like baseball bat to the head) at 2230 on 3/11; 15 mins later he developed intractable N/V. Went to Urgent Care 3/12, sent home on tamiflu. HA/nausea/vomiting persisted, so patient went to [facility] ED where CT/CTA done showing IPH in L ventral parietal lobe rupturing into subarachnoid space, CTA head w/ findings concerning for Flores Flores. Transferred to [facility] for further workup. On initial evaluation at [facility], patient HA improved although with photophobia, no focal neurologic deficits, NIHSS 0.     SUBJECTIVE: No events overnight.  No new neurologic complaints.      acetaminophen  IVPB .. 1000 milliGRAM(s) IV Intermittent once  HYDROmorphone  Injectable 1 milliGRAM(s) IV Push every 4 hours  niCARdipine Infusion 5 mG/Hr IV Continuous <Continuous>  ondansetron Injectable 4 milliGRAM(s) IV Push every 6 hours PRN      PHYSICAL EXAM:   Vital Signs Last 24 Hrs  T(C): 36.9 (16 Mar 2019 23:00), Max: 36.9 (16 Mar 2019 12:20)  T(F): 98.5 (16 Mar 2019 23:00), Max: 98.5 (16 Mar 2019 23:00)  HR: 55 (17 Mar 2019 06:00) (55 - 76)  BP: 129/96 (17 Mar 2019 06:00) (117/61 - 148/93)  BP(mean): 107 (17 Mar 2019 06:00) (76 - 109)  RR: 14 (17 Mar 2019 06:00) (10 - 22)  SpO2: 95% (17 Mar 2019 06:00) (94% - 100%)    General: No acute distress  HEENT: EOM intact, visual fields full  Abdomen: Soft, nontender, nondistended   Extremities: No edema    NEUROLOGICAL EXAM:  Mental status: Awake, alert, oriented x3, no aphasia, no neglect, normal memory   Cranial Nerves: No facial asymmetry, no nystagmus, no dysarthria,  tongue midline  Motor exam: Normal tone, no drift, 5/5 RUE, 5/5 RLE, 5/5 LUE, 5/5 LLE, normal fine finger movements.  Sensation: Intact to light touch   Coordination/ Gait: No dysmetria, SHELLIE intact and symmetric bilaterally    LABS:                        15.6   13.7  )-----------( 226      ( 17 Mar 2019 04:52 )             44.4    03-17    137  |  102  |  15  ----------------------------<  104<H>  4.1   |  20<L>  |  1.04    Ca    9.5      17 Mar 2019 04:54    TPro  6.9  /  Alb  4.7  /  TBili  0.7  /  DBili  x   /  AST  17  /  ALT  42  /  AlkPhos  84  03-16  PT/INR - ( 16 Mar 2019 07:26 )   PT: 12.7 sec;   INR: 1.11 ratio         PTT - ( 16 Mar 2019 07:26 )  PTT:28.2 sec      IMAGING: Reviewed by me.   CT HEAD (03/16):In comparison with prior report from 3/16/2019 at 5:00 AM redemonstration   of a left 1.1 x 1.1 cm intraparenchymal hemorrhage at the left posterior    limb of the left internal capsule, putamen and left external capsule,   edema and subarachnoid hemorrhage, no new midline shift. Basal cisterns   remain patent.    CT HEAD (03/16): Acute intraparenchymal hemorrhage in the ventral left parietal lobe   measuring 1.9 x 1 x 1 cm appears to have ruptured into the subarachnoid   space.  There is mild subarachnoid hemorrhage in the left sylvian fissure   and trace subarachnoid hemorrhage in the left frontal and parietal   regions. THE PATIENT WAS SEEN AND EXAMINED BY ME WITH THE HOUSESTAFF AND STROKE TEAM DURING MORNING ROUNDS.   HPI:  43yo RH  male PMH HTN tx from OSH, p/w HA. LKW 3/11 2230. Patient developed sudden onset HA(felt like baseball bat to the head) at 2230 on 3/11; 15 mins later he developed intractable N/V. Went to Urgent Care 3/12, sent home on tamiflu. HA/nausea/vomiting persisted, so patient went to [facility] ED where CT/CTA done showing IPH in L ventral parietal lobe rupturing into subarachnoid space, CTA head w/ findings concerning for Flores Flores. Transferred to [facility] for further workup. On initial evaluation at [facility], patient HA improved although with photophobia, no focal neurologic deficits, NIHSS 0.     SUBJECTIVE: Horrible headache    acetaminophen  IVPB .. 1000 milliGRAM(s) IV Intermittent once  HYDROmorphone  Injectable 1 milliGRAM(s) IV Push every 4 hours  niCARdipine Infusion 5 mG/Hr IV Continuous <Continuous>  ondansetron Injectable 4 milliGRAM(s) IV Push every 6 hours PRN      PHYSICAL EXAM:   Vital Signs Last 24 Hrs  T(C): 36.9 (16 Mar 2019 23:00), Max: 36.9 (16 Mar 2019 12:20)  T(F): 98.5 (16 Mar 2019 23:00), Max: 98.5 (16 Mar 2019 23:00)  HR: 55 (17 Mar 2019 06:00) (55 - 76)  BP: 129/96 (17 Mar 2019 06:00) (117/61 - 148/93)  BP(mean): 107 (17 Mar 2019 06:00) (76 - 109)  RR: 14 (17 Mar 2019 06:00) (10 - 22)  SpO2: 95% (17 Mar 2019 06:00) (94% - 100%)    General: No acute distress  HEENT: EOM intact, visual fields full  Abdomen: Soft, nontender, nondistended   Extremities: No edema    NEUROLOGICAL EXAM:  Mental status: Awake, alert, oriented x3, no aphasia, no neglect, normal memory   Cranial Nerves: No facial asymmetry, no nystagmus, no dysarthria,  tongue midline  Motor exam: Normal tone, no drift, 5/5 RUE, 5/5 RLE, 5/5 LUE, 5/5 LLE, normal fine finger movements.  Sensation: Intact to light touch   Coordination/ Gait: No dysmetria, SHELLIE intact and symmetric bilaterally    LABS:                        15.6   13.7  )-----------( 226      ( 17 Mar 2019 04:52 )             44.4    03-17    137  |  102  |  15  ----------------------------<  104<H>  4.1   |  20<L>  |  1.04    Ca    9.5      17 Mar 2019 04:54    TPro  6.9  /  Alb  4.7  /  TBili  0.7  /  DBili  x   /  AST  17  /  ALT  42  /  AlkPhos  84  03-16  PT/INR - ( 16 Mar 2019 07:26 )   PT: 12.7 sec;   INR: 1.11 ratio         PTT - ( 16 Mar 2019 07:26 )  PTT:28.2 sec      IMAGING: Reviewed by me.   CT HEAD (03/16): In comparison with prior report from 3/16/2019 at 5:00 AM redemonstration   of a left 1.1 x 1.1 cm intraparenchymal hemorrhage at the left posterior    limb of the left internal capsule, putamen and left external capsule,   edema and subarachnoid hemorrhage, no new midline shift. Basal cisterns   remain patent.    CT HEAD (03/16): Acute intraparenchymal hemorrhage in the ventral left parietal lobe   measuring 1.9 x 1 x 1 cm appears to have ruptured into the subarachnoid   space.  There is mild subarachnoid hemorrhage in the left sylvian fissure   and trace subarachnoid hemorrhage in the left frontal and parietal   regions.

## 2019-03-18 ENCOUNTER — APPOINTMENT (OUTPATIENT)
Dept: NEUROSURGERY | Facility: HOSPITAL | Age: 44
End: 2019-03-18
Payer: COMMERCIAL

## 2019-03-18 LAB
ANION GAP SERPL CALC-SCNC: 15 MMOL/L — SIGNIFICANT CHANGE UP (ref 5–17)
AUTO DIFF PNL BLD: NEGATIVE — SIGNIFICANT CHANGE UP
BUN SERPL-MCNC: 18 MG/DL — SIGNIFICANT CHANGE UP (ref 7–23)
C-ANCA SER-ACNC: NEGATIVE — SIGNIFICANT CHANGE UP
C3 SERPL-MCNC: 166 MG/DL — HIGH (ref 81–157)
C4 SERPL-MCNC: 33 MG/DL — SIGNIFICANT CHANGE UP (ref 13–39)
CALCIUM SERPL-MCNC: 9.3 MG/DL — SIGNIFICANT CHANGE UP (ref 8.4–10.5)
CHLORIDE SERPL-SCNC: 103 MMOL/L — SIGNIFICANT CHANGE UP (ref 96–108)
CO2 SERPL-SCNC: 18 MMOL/L — LOW (ref 22–31)
CREAT SERPL-MCNC: 0.97 MG/DL — SIGNIFICANT CHANGE UP (ref 0.5–1.3)
GLUCOSE SERPL-MCNC: 89 MG/DL — SIGNIFICANT CHANGE UP (ref 70–99)
P-ANCA SER-ACNC: NEGATIVE — SIGNIFICANT CHANGE UP
POTASSIUM SERPL-MCNC: 5.2 MMOL/L — SIGNIFICANT CHANGE UP (ref 3.5–5.3)
POTASSIUM SERPL-SCNC: 5.2 MMOL/L — SIGNIFICANT CHANGE UP (ref 3.5–5.3)
SODIUM SERPL-SCNC: 136 MMOL/L — SIGNIFICANT CHANGE UP (ref 135–145)

## 2019-03-18 PROCEDURE — 36226 PLACE CATH VERTEBRAL ART: CPT | Mod: 50

## 2019-03-18 PROCEDURE — 76377 3D RENDER W/INTRP POSTPROCES: CPT | Mod: 26

## 2019-03-18 PROCEDURE — 70553 MRI BRAIN STEM W/O & W/DYE: CPT | Mod: 26

## 2019-03-18 PROCEDURE — 99233 SBSQ HOSP IP/OBS HIGH 50: CPT

## 2019-03-18 PROCEDURE — 70544 MR ANGIOGRAPHY HEAD W/O DYE: CPT | Mod: 26,59

## 2019-03-18 PROCEDURE — 36227 PLACE CATH XTRNL CAROTID: CPT | Mod: 50

## 2019-03-18 PROCEDURE — 70549 MR ANGIOGRAPH NECK W/O&W/DYE: CPT | Mod: 26

## 2019-03-18 PROCEDURE — 36224 PLACE CATH CAROTD ART: CPT | Mod: 50

## 2019-03-18 RX ORDER — ENOXAPARIN SODIUM 100 MG/ML
40 INJECTION SUBCUTANEOUS DAILY
Qty: 0 | Refills: 0 | Status: DISCONTINUED | OUTPATIENT
Start: 2019-03-18 | End: 2019-03-21

## 2019-03-18 RX ORDER — OXYCODONE AND ACETAMINOPHEN 5; 325 MG/1; MG/1
1 TABLET ORAL EVERY 6 HOURS
Qty: 0 | Refills: 0 | Status: DISCONTINUED | OUTPATIENT
Start: 2019-03-18 | End: 2019-03-20

## 2019-03-18 RX ORDER — ACETAMINOPHEN 500 MG
1000 TABLET ORAL ONCE
Qty: 0 | Refills: 0 | Status: COMPLETED | OUTPATIENT
Start: 2019-03-18 | End: 2019-03-18

## 2019-03-18 RX ADMIN — OXYCODONE AND ACETAMINOPHEN 1 TABLET(S): 5; 325 TABLET ORAL at 19:23

## 2019-03-18 RX ADMIN — Medication 25 MILLIGRAM(S): at 06:23

## 2019-03-18 RX ADMIN — Medication 400 MILLIGRAM(S): at 06:22

## 2019-03-18 RX ADMIN — AMLODIPINE BESYLATE 10 MILLIGRAM(S): 2.5 TABLET ORAL at 06:23

## 2019-03-18 RX ADMIN — ONDANSETRON 4 MILLIGRAM(S): 8 TABLET, FILM COATED ORAL at 06:23

## 2019-03-18 RX ADMIN — HYDROMORPHONE HYDROCHLORIDE 1 MILLIGRAM(S): 2 INJECTION INTRAMUSCULAR; INTRAVENOUS; SUBCUTANEOUS at 02:36

## 2019-03-18 RX ADMIN — Medication 5 MILLIGRAM(S): at 19:42

## 2019-03-18 RX ADMIN — ENOXAPARIN SODIUM 40 MILLIGRAM(S): 100 INJECTION SUBCUTANEOUS at 22:37

## 2019-03-18 RX ADMIN — LOSARTAN POTASSIUM 100 MILLIGRAM(S): 100 TABLET, FILM COATED ORAL at 06:23

## 2019-03-18 RX ADMIN — HYDROMORPHONE HYDROCHLORIDE 1 MILLIGRAM(S): 2 INJECTION INTRAMUSCULAR; INTRAVENOUS; SUBCUTANEOUS at 02:06

## 2019-03-18 RX ADMIN — Medication 1000 MILLIGRAM(S): at 06:30

## 2019-03-18 RX ADMIN — OXYCODONE AND ACETAMINOPHEN 1 TABLET(S): 5; 325 TABLET ORAL at 19:53

## 2019-03-18 RX ADMIN — Medication 25 MILLIGRAM(S): at 22:37

## 2019-03-18 NOTE — PROGRESS NOTE ADULT - SUBJECTIVE AND OBJECTIVE BOX
Subjective: Patient seen and examined. No new events except as noted.   remains in Stroke unit   +headache   off cardene gtt   wife at bedside       REVIEW OF SYSTEMS:    CONSTITUTIONAL: + weakness, fevers or chills  EYES/ENT: No visual changes;  No vertigo or throat pain   NECK: No pain or stiffness  RESPIRATORY: No cough, wheezing, hemoptysis; No shortness of breath  CARDIOVASCULAR: No chest pain or palpitations  GASTROINTESTINAL: No abdominal or epigastric pain. No nausea, vomiting, or hematemesis; No diarrhea or constipation. No melena or hematochezia.  GENITOURINARY: No dysuria, frequency or hematuria  NEUROLOGICAL: + numbness or weakness +headache   SKIN: No itching, burning, rashes, or lesions   All other review of systems is negative unless indicated above.    MEDICATIONS:  MEDICATIONS  (STANDING):  amLODIPine   Tablet 10 milliGRAM(s) Oral daily  enoxaparin Injectable 40 milliGRAM(s) SubCutaneous daily  hydrALAZINE 25 milliGRAM(s) Oral every 8 hours  losartan 100 milliGRAM(s) Oral daily      PHYSICAL EXAM:  T(C): 36.7 (03-17-19 @ 20:00), Max: 36.7 (03-17-19 @ 20:00)  HR: 78 (03-18-19 @ 10:34) (54 - 91)  BP: 148/81 (03-18-19 @ 10:34) (120/64 - 148/81)  RR: 18 (03-18-19 @ 10:34) (9 - 20)  SpO2: 98% (03-18-19 @ 10:34) (94% - 100%)  Wt(kg): --  I&O's Summary    17 Mar 2019 07:01  -  18 Mar 2019 07:00  --------------------------------------------------------  IN: 180 mL / OUT: 0 mL / NET: 180 mL      Height (cm): 177.8 (03-18 @ 10:34)  Weight (kg): 99.8 (03-18 @ 10:34)  BMI (kg/m2): 31.6 (03-18 @ 10:34)  BSA (m2): 2.17 (03-18 @ 10:34)    Appearance: NAD  HEENT:   Normal oral mucosa, PERRL, EOMI	  Lymphatic: No lymphadenopathy , no edema  Cardiovascular: Normal S1 S2, No JVD, No murmurs , Peripheral pulses palpable 2+ bilaterally  Respiratory: Lungs clear to auscultation, normal effort 	  Gastrointestinal:  Soft, Non-tender, + BS	  Skin: No rashes, No ecchymoses, No cyanosis, warm to touch  Musculoskeletal: Normal range of motion, normal strength  Psychiatry:  Mood & affect appropriate  Ext: No edema  NEUROLOGICAL EXAM:  Mental status: Awake, alert, oriented x3, no aphasia, no neglect, normal memory   Cranial Nerves: No facial asymmetry, no nystagmus, no dysarthria,  tongue midline  Motor exam: Normal tone, no drift, 5/5 RUE, 5/5 RLE, 5/5 LUE, 5/5 LLE, normal fine finger movements.  Sensation: Intact to light touch   Coordination/ Gait: No dysmetria, SHELLIE intact and symmetric bilaterally      LABS:    CARDIAC MARKERS:                                15.6   13.7  )-----------( 226      ( 17 Mar 2019 04:52 )             44.4     03-18    136  |  103  |  18  ----------------------------<  89  5.2   |  18<L>  |  0.97    Ca    9.3      18 Mar 2019 07:11    TPro  6.9  /  Alb  4.7  /  TBili  0.7  /  DBili  x   /  AST  17  /  ALT  42  /  AlkPhos  84  03-16    proBNP:   Lipid Profile:   HgA1c:   TSH:             TELEMETRY: 	SR    ECG:  	  RADIOLOGY:   DIAGNOSTIC TESTING:  [ ] Echocardiogram:  < from: Transthoracic Echocardiogram (03.17.19 @ 11:25) >    Patient name: GLORIA MANN  YOB: 1975   Age: 44 (M)   MR#: [id number]  Study Date: 3/17/2019  Location: O/PSonographer: Dash Leigh RDCS  Study quality: Technically good  Referring Physician: Froy Newby MD  Blood Pressure: 123/69 mmHg  Height: 173 cm  Weight: 100 kg  BSA: 2.1 m2  ------------------------------------------------------------------------  PROCEDURE: Transthoracic echocardiogram with 2-D, M-Mode  and complete spectral and color flow Doppler.  INDICATION: Other cerebrovascular disease (I67.89)  ------------------------------------------------------------------------  Dimensions:    Normal Values:  LA:     3.1    2.0 - 4.0 cm  Ao:     2.4    2.0 - 3.8 cm  SEPTUM: 1.0    0.6 - 1.2 cm  PWT:    1.1    0.6 - 1.1 cm  LVIDd:  5.0    3.0 - 5.6 cm  LVIDs:  3.1    1.8 - 4.0 cm  Derived variables:  LVMI: 91 g/m2  RWT: 0.44  Fractional short: 38 %  EF (Teicholtz): 68 %  Doppler Peak Velocity (m/sec): AoV=1.3  ------------------------------------------------------------------------  Observations:  Mitral Valve: Normal mitral valve.  Aortic Valve/Aorta: Normal trileaflet aortic valve. Peak  transaortic valve gradient equals 7 mm Hg, aortic valve  velocity time integral equals 29 cm. Peak left ventricular  outflow tract gradient equals 5 mm Hg, LVOT velocity time  integral equals 24 cm.  Aortic Root: 2.4 cm.  LVOT diameter: 1.8 cm.  Left Atrium: LA volume index = 12 cc/m2.  Left Ventricle: Normal left ventricular systolic function.  No segmental wall motion abnormalities. Normal left  ventricular internal dimensions and wall thicknesses.  Right Heart: Normal right atrium. Normal right ventricular  size and function. Normal tricuspid valve. Normal pulmonic  valve.  Pericardium/Pleura: Normal pericardium withno pericardial  effusion.  Hemodynamic: Estimated right atrial pressure is 8 mm Hg.  Estimated right ventricular systolic pressure equals 39 mm  Hg, assuming right atrial pressure equals 8 mm Hg,  consistent with borderline pulmonary hypertension.  ------------------------------------------------------------------------  Conclusions:  1. Normal left ventricular internal dimensions and wall  thicknesses.  2. Normal left ventricular systolic function. No segmental  wall motion abnormalities.  *** No previous Echo exam.  ------------------------------------------------------------------------  Confirmed on  3/17/2019 - 17:07:09 by Shaheen Gastelum M.D.  ------------------------------------------------------------------------    < end of copied text >    [ ]  Catheterization:  [ ] Stress Test:    OTHER:

## 2019-03-18 NOTE — PROGRESS NOTE ADULT - ASSESSMENT
43 yo male admitted to the Stroke unit with intracranial hemorrhage in setting of hypertensive emergency   Cont  norvasc 10 mg daily , Losartan 100 mg and Hydralazine 25 mg TID   Nsx or Neurology follow up. Possible angiogram today   Outpatient sleep study

## 2019-03-18 NOTE — CHART NOTE - NSCHARTNOTEFT_GEN_A_CORE
Interventional Neuro- Radiology   Procedure Note HASMUKH    Procedure: Selective Cerebral Angiography   Pre- Procedure Diagnosis:  Post- Procedure Diagnosis:    : Dr. Brien Badillo MD    Physician Assistant: Hoa Mujica PA-C    RN:    Anesthesia: (MAC)   (general anesthesia)    Sheath:    I/Os:  Estimated blood loss less than 10cc  IV fluids:     cc     Urine output     cc        Contrast Omnipaque 240      cc         Antibiotics:    Vitals: BP         HR      Spo2    %      Spo2    %    Preliminary Report:    Using a 4 Fr short/long sheath to the right groin under MAC sedation via   left vertebral artery,  left common carotid artery, left external carotid artey, right vertebral arter,  right common carotid artery, right external carotid artery  a selective cerebral angiography was performed and  demonstrates ________. ( Official note to follow).  Patient tolerated procedure well, hemodynamically stable, no change in neurological status compared to baseline.  Results discussed with neurosurgery, patient and patient's  family.  Groin sheath was removed,  manual compression held to hemostasis  for  21 minutes, no active bleeding, no hematoma, avitene applied,  quick clot and safeguard balloon dressing applied at _____h.  STAT labs:  CBC BMP  ____h.  Patient transfered to Recovery Room Interventional Neuro- Radiology   Procedure Note    Procedure: Selective Cerebral Angiography   Pre- Procedure Diagnosis: Dang Dang disease   Post- Procedure Diagnosis: Dang Dang disease     : Dr. Brien Badillo MD    Resident: Maira Sigala MD     Fellow: Kathy Porter MD     RN: Malathi    Anesthesia: MAC    Sheath: 5 Norwegian Long       Preliminary Report:    Using a 5 Fr long sheath to the right groin under MAC sedation via   left vertebral artery,  left common carotid artery, left external carotid artery, right vertebral artery,  right common carotid artery, right external carotid artery  a selective cerebral angiography was performed and  demonstrates left sided ICA stenosis suggestive of dang dang disease. ( Official note to follow).  Patient tolerated procedure well, hemodynamically stable, no change in neurological status compared to baseline.  Results discussed with neurosurgery, patient and patient's  family.  Groin sheath was removed,  manual compression held to hemostasis  for  15 minutes, no active bleeding, no hematoma, avitene applied,  quick clot and safeguard balloon dressing applied at 13:45 h.    Patient transfered to Recovery Room

## 2019-03-18 NOTE — PROGRESS NOTE ADULT - ASSESSMENT
45 yo M w/ hx of HTN who p/w small L posterior external capsule/insular IPH w/ no mass effect or shift. Patient is neurologically intact on exam. 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 Patient will need further imaging prior to treatment consideration.    Plan:  q4 neuro checks  SBP<160  MRI/MRA [facility] brain  Possible Angiogram today with Dr. Badillo

## 2019-03-18 NOTE — DIETITIAN INITIAL EVALUATION ADULT. - OTHER INFO
Pt seen for: Stroke Unit length of stay   Adm dx: CVA    GI issues: no N/V/D   Last BM: 3/15     Food allergies/Intolerances: NKFA    Vit/supplement PTA: none

## 2019-03-18 NOTE — PHYSICAL THERAPY INITIAL EVALUATION ADULT - ADDITIONAL COMMENTS
as per pt and mother at b/s, pt resides in a  with wife, 2+1 stairs to enter with no railings, one flight up to bedroom with railings, PTA, pt amb independently (I), (I) with ADls. L hand dominant

## 2019-03-18 NOTE — PROGRESS NOTE ADULT - ASSESSMENT
43yo RH  male PMH HTN tx from OSH, p/w HA. LKW 3/11 2230. Patient developed sudden onset HA(felt like baseball bat to the head) at 2230 on 3/11; 15 mins later he developed intractable N/V.  CT/CTA done showing IPH in L ventral parietal lobe rupturing into subarachnoid space, CTA head w/ findings concerning for Dang Dang. Transferred to [facility] for further workup.  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     NEURO: Continue close monitoring for neurologic deterioration and monitor headaches and pain, neurosurgery consulted for  Dang Dang syndrome- s/p cerebral angiogram with preliminary findings suggestive of dang dang disease, LDL 85- statin not indicated as nonatherosclerotic stroke and LDL at goal, MR imaging as noted above, NM SPECT pending w/w/out diamox. Physical therapy/OT eval for home with outpatient services.    ANTITHROMBOTIC THERAPY: not indicated due to IPH    PULMONARY:  protecting airway, saturating well     CARDIOVASCULAR:  TTE: ef 68%, borderline pulmonary htn , cardiac monitoring without acute events noted                           SBP goal: <140    GASTROINTESTINAL:  dysphagia screen- passed, tolerating Regular diet       Diet: Regular    RENAL: BUN/Cr without acute change, good urine output , maintain adequate hydration      Na Goal: Greater than 135     Dorsey: N    HEMATOLOGY: H/H without acute change, Platelets 226, no active signs of bleeding,  hypercoag panel pending.      DVT ppx: lovenox     ID: afebrile, leukocytosis- no overt si/sx of infection, will continue to monitor, RVP negative     OTHER: Plan of care discussed with patient and wife at bedside.     DISPOSITION: Home with outpatient services       CORE MEASURES:        Admission NIHSS: 0     TPA: [] YES [x] NO      LDL/HDL: 85/38     Depression Screen: 0     Statin Therapy: N     Dysphagia Screen: [x] PASS [] FAIL     Smoking [] YES [x] NO      Afib [] YES [x] NO     Stroke Education [x] YES [] NO 44 years old man with vascular risk factor of hypertension initially presented to urgent care center for evaluation of thunderclap headache on 3/12.  He was diagnosed to have "flu" and was discharged on Tamiflu. He presented to [facility] ED due to persistence of headache and was subsequently transferred to [facility] for further management. CT brain or admission showed left sylvian fissure subarachnoid hemorrhage and acute small ICH in the left posterior putamen/lentiform nuclei. CTA head to my eye shows large vessel intracranial vasculopathy involving left supraclinoid ICA and proximal MCA M1 segment (leading to critical stenosis versus occlusion) and was concerning for dilated lenticulostriate branches. TTE was grossly unremarkable and did not show any evidence of LVH.    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    NEURO: Continue close monitoring for neurologic deterioration and monitor headaches and pain, symptomatic management of headaches, neurosurgery consulted for Dang Dang syndrome - s/p cerebral angiogram with preliminary findings suggestive of dang dang disease, no indications for statin therapy considering low 10-year ASCVD risk (1.7%) and non-atherosclerotic etiology of his large vessel intracranial vasculopathy, MR imaging as noted above, NM SPECT pending w/w/out Diamox to determine cerebrovascular reserve to evaluate for his candidacy for STA-MCA Bypass for primary ischemic and secondary ICH stroke prevention, Physical therapy/OT eval for home with outpatient services.    ANTITHROMBOTIC THERAPY: None in the setting of ICH and no specific indications at this time     PULMONARY:  protecting airway, saturating well     CARDIOVASCULAR:  TTE: ef 68%, borderline pulmonary htn, cardiac monitoring without acute events noted                           SBP goal: very gradual normotension in the setting of large vessel intracranial vasculopathy    GASTROINTESTINAL:  dysphagia screen - passed, tolerating Regular diet       Diet: Regular    RENAL: BUN/Cr without acute change, good urine output, maintain adequate hydration      Na Goal: Greater than 135     Dorsey: N    HEMATOLOGY: H/H without acute change, Platelets 226, no active signs of bleeding, Hypercoag panel pending. hemoglobin electrophoresis to rule out possibility of sickle cell disease as it has been associated with moyamoya syndrome      DVT ppx: lovenox considering clinical and radiological stability more than 48 hours from the ICH     ID: afebrile, leukocytosis - no overt si/sx of infection, will continue to monitor, RVP negative     OTHER: Plan of care discussed with patient and wife at bedside. All the questions were answered and concerns were addressed.     DISPOSITION: Home with outpatient services     CORE MEASURES:        Admission NIHSS: 0     TPA: [] YES [x] NO      LDL/HDL: 85/38     Depression Screen: 0     Statin Therapy: N     Dysphagia Screen: [x] PASS [] FAIL     Smoking [] YES [x] NO      Afib [] YES [x] NO     Stroke Education [x] YES [] NO

## 2019-03-18 NOTE — DIETITIAN INITIAL EVALUATION ADULT. - ENERGY NEEDS
Ht: 70"  Wt: 220   BMI: 31.6 kg/m2   IBW: 166  (+/-10%)     132% IBW  Edema: none        Skin: no pressure injuries documented

## 2019-03-18 NOTE — PROGRESS NOTE ADULT - SUBJECTIVE AND OBJECTIVE BOX
THE PATIENT WAS SEEN AND EXAMINED BY ME WITH THE HOUSESTAFF AND STROKE TEAM DURING MORNING ROUNDS.   HPI:  43yo RH  male PMH HTN tx from OSH, p/w HA. LKW 3/11 2230. Patient developed sudden onset HA(felt like baseball bat to the head) at 2230 on 3/11; 15 mins later he developed intractable N/V. Went to Urgent Care 3/12, sent home on tamiflu. HA/nausea/vomiting persisted, so patient went to [facility] ED where CT/CTA done showing IPH in L ventral parietal lobe rupturing into subarachnoid space, CTA head w/ findings concerning for Flores Flores. Transferred to [facility] for further workup. On initial evaluation at [facility], patient HA improved although with photophobia, no focal neurologic deficits, NIHSS 0. (16 Mar 2019 10:34)      SUBJECTIVE: No events overnight.  No new neurologic complaints.      amLODIPine   Tablet 10 milliGRAM(s) Oral daily  enoxaparin Injectable 40 milliGRAM(s) SubCutaneous daily  hydrALAZINE 25 milliGRAM(s) Oral every 8 hours  hydrALAZINE Injectable 5 milliGRAM(s) IV Push every 6 hours PRN  losartan 100 milliGRAM(s) Oral daily  ondansetron Injectable 4 milliGRAM(s) IV Push every 6 hours PRN  oxyCODONE    5 mG/acetaminophen 325 mG 1 Tablet(s) Oral every 6 hours PRN      PHYSICAL EXAM:   Vital Signs Last 24 Hrs  T(C): 36.7 (17 Mar 2019 20:00), Max: 36.7 (17 Mar 2019 20:00)  T(F): 98 (17 Mar 2019 20:00), Max: 98 (17 Mar 2019 20:00)  HR: 78 (18 Mar 2019 10:34) (54 - 91)  BP: 148/81 (18 Mar 2019 10:34) (120/64 - 148/81)  BP(mean): 102 (18 Mar 2019 10:34) (79 - 102)  RR: 18 (18 Mar 2019 10:34) (9 - 20)  SpO2: 98% (18 Mar 2019 10:34) (94% - 100%)    General: No acute distress  HEENT: EOM intact, visual fields full  Abdomen: Soft, nontender, nondistended   Extremities: No edema    NEUROLOGICAL EXAM:  Mental status: Awake, alert, oriented x3, no aphasia, no neglect, normal memory   Cranial Nerves: No facial asymmetry, no nystagmus, no dysarthria,  tongue midline  Motor exam: Normal tone, no drift, 5/5 RUE, 5/5 RLE, 5/5 LUE, 5/5 LLE, normal fine finger movements.  Sensation: Intact to light touch   Coordination/ Gait: No dysmetria, SHELLIE intact and symmetric bilaterally    LABS:                        15.6   13.7  )-----------( 226      ( 17 Mar 2019 04:52 )             44.4    03-18    136  |  103  |  18  ----------------------------<  89  5.2   |  18<L>  |  0.97    Ca    9.3      18 Mar 2019 07:11      Hemoglobin A1C, Whole Blood: 4.6 % (03-17 @ 08:50)      IMAGING: Reviewed by me.   CT HEAD (03/16): In comparison with prior report from 3/16/2019 at 5:00 AM redemonstration   of a left 1.1 x 1.1 cm intraparenchymal hemorrhage at the left posterior    limb of the left internal capsule, putamen and left external capsule,   edema and subarachnoid hemorrhage, no new midline shift. Basal cisterns   remain patent.    CT HEAD (03/16): Acute intraparenchymal hemorrhage in the ventral left parietal lobe   measuring 1.9 x 1 x 1 cm appears to have ruptured into the subarachnoid   space.  There is mild subarachnoid hemorrhage in the left sylvian fissure   and trace subarachnoid hemorrhage in the left frontal and parietal   regions.    CT Head No Cont (03.17.19)   Unchanged left intraparenchymal hemorrhage involving the posterior limb   of the left internal capsule, left external capsule and putamen with   surrounding edema and small associated subarachnoid hemorrhage. No   midline shift.  No new areas of hemorrhage. No hydrocephalus.    MRI/ MR Angio Neck Head No Cont (03.18.19)   1. Evidence of the left supraclinoid carotid occlusion is noted with the   presence of a hemorrhage in the distal left internal capsule region in   the posterior subinsular region.  2. MRA of the [place] demonstrates a left supraclinoid   occlusion. The left A2 is fed from the right. Evidence of   collateralization with increased flow in the left A2 and left PCA as   indicated on the  NOVA study. No left M1 is visualized. The more distal   MCA on the left likely fills from collateralization via the hypertrophied    lenticulostriates visualized on patient's catheter angiogram 3/18/2019   as well as the leptomeningeal collaterals. Nova report is included for   review. THE PATIENT WAS SEEN AND EXAMINED BY ME WITH THE HOUSESTAFF AND STROKE TEAM DURING MORNING ROUNDS.     HPI:  43yo RH  male PMH HTN tx from OSH, p/w HA. LKW 3/11 2230. Patient developed sudden onset HA(felt like baseball bat to the head) at 2230 on 3/11; 15 mins later he developed intractable N/V. Went to Urgent Care 3/12, sent home on tamiflu. HA/nausea/vomiting persisted, so patient went to [facility] ED where CT/CTA done showing IPH in L ventral parietal lobe rupturing into subarachnoid space, CTA head w/ findings concerning for Flores Flores. Transferred to [facility] for further workup. On initial evaluation at [facility], patient HA improved although with photophobia, no focal neurologic deficits, NIHSS 0. (16 Mar 2019 10:34)    SUBJECTIVE: No events overnight.  No new neurologic complaints.      ROS: All negative except documented above.    amLODIPine   Tablet 10 milliGRAM(s) Oral daily  enoxaparin Injectable 40 milliGRAM(s) SubCutaneous daily  hydrALAZINE 25 milliGRAM(s) Oral every 8 hours  hydrALAZINE Injectable 5 milliGRAM(s) IV Push every 6 hours PRN  losartan 100 milliGRAM(s) Oral daily  ondansetron Injectable 4 milliGRAM(s) IV Push every 6 hours PRN  oxyCODONE    5 mG/acetaminophen 325 mG 1 Tablet(s) Oral every 6 hours PRN      PHYSICAL EXAM:   Vital Signs Last 24 Hrs  T(C): 36.7 (17 Mar 2019 20:00), Max: 36.7 (17 Mar 2019 20:00)  T(F): 98 (17 Mar 2019 20:00), Max: 98 (17 Mar 2019 20:00)  HR: 78 (18 Mar 2019 10:34) (54 - 91)  BP: 148/81 (18 Mar 2019 10:34) (120/64 - 148/81)  BP(mean): 102 (18 Mar 2019 10:34) (79 - 102)  RR: 18 (18 Mar 2019 10:34) (9 - 20)  SpO2: 98% (18 Mar 2019 10:34) (94% - 100%)    General: No acute distress  HEENT: EOM intact, visual fields full  Abdomen: Soft, nontender, nondistended   Extremities: No edema    NEUROLOGICAL EXAM:  Mental status: Awake, alert, oriented x3, no aphasia, no neglect, normal memory   Cranial Nerves: No facial asymmetry, no nystagmus, no dysarthria,  tongue midline  Motor exam: Normal tone, no drift, 5/5 RUE, 5/5 RLE, 5/5 LUE, 5/5 LLE, normal fine finger movements.  Sensation: Intact to light touch   Coordination/ Gait: No dysmetria, SHELLIE intact and symmetric bilaterally    LABS:                        15.6   13.7  )-----------( 226      ( 17 Mar 2019 04:52 )             44.4    03-18    136  |  103  |  18  ----------------------------<  89  5.2   |  18<L>  |  0.97    Ca    9.3      18 Mar 2019 07:11      Hemoglobin A1C, Whole Blood: 4.6 % (03-17 @ 08:50)      IMAGING: Reviewed by me.   CT HEAD (03/16): In comparison with prior report from 3/16/2019 at 5:00 AM redemonstration   of a left 1.1 x 1.1 cm intraparenchymal hemorrhage at the left posterior    limb of the left internal capsule, putamen and left external capsule,   edema and subarachnoid hemorrhage, no new midline shift. Basal cisterns   remain patent.    CT HEAD (03/16): Acute intraparenchymal hemorrhage in the ventral left parietal lobe   measuring 1.9 x 1 x 1 cm appears to have ruptured into the subarachnoid   space.  There is mild subarachnoid hemorrhage in the left sylvian fissure   and trace subarachnoid hemorrhage in the left frontal and parietal   regions.    CT Head No Cont (03.17.19)   Unchanged left intraparenchymal hemorrhage involving the posterior limb   of the left internal capsule, left external capsule and putamen with   surrounding edema and small associated subarachnoid hemorrhage. No   midline shift.  No new areas of hemorrhage. No hydrocephalus.    MRI/ MR Angio Neck Head No Cont (03.18.19)   1. Evidence of the left supraclinoid carotid occlusion is noted with the   presence of a hemorrhage in the distal left internal capsule region in   the posterior subinsular region.  2. MRA of the [place] demonstrates a left supraclinoid   occlusion. The left A2 is fed from the right. Evidence of   collateralization with increased flow in the left A2 and left PCA as   indicated on the  NOVA study. No left M1 is visualized. The more distal   MCA on the left likely fills from collateralization via the hypertrophied    lenticulostriates visualized on patient's catheter angiogram 3/18/2019   as well as the leptomeningeal collaterals. Nova report is included for   review.

## 2019-03-18 NOTE — PROGRESS NOTE ADULT - SUBJECTIVE AND OBJECTIVE BOX
Patient seen and examined at bedside.    T(C): 36.7 (03-17-19 @ 20:00), Max: 36.9 (03-17-19 @ 08:00)  HR: 56 (03-18-19 @ 04:00) (54 - 72)  BP: 126/69 (03-18-19 @ 04:00) (120/64 - 142/90)  RR: 15 (03-18-19 @ 04:00) (11 - 20)  SpO2: 96% (03-18-19 @ 04:00) (94% - 100%)  Wt(kg): --    Exam:    Neurological: AOx3, Following Commands, Moving all Extremities   PERRL, EOMI  Face symmetric tongue midline  Motor exam:          Upper extremity                         Delt     Bicep     Tricep    HG                                                 R         5/5        5/5        5/5       5/5                                               L          5/5        5/5        5/5       5/5          Lower extremity                        HF         KF        KE       DF         PF                                                  R        5/5        5/5        5/5       5/5         5/5                                               L         5/5        5/5       5/5       5/5          5/5                                                 Sensation: [x] intact to light touch  [] decreased:

## 2019-03-18 NOTE — PHYSICAL THERAPY INITIAL EVALUATION ADULT - PERTINENT HX OF CURRENT PROBLEM, REHAB EVAL
44yoM with HTN, transferred from OSH for HA, found IPH with SAH, CT Head 3/17, L intraparenchymal hemorrhage, L small SAH, ECG 3/16, incomplete R BBB, minimal voltage criteria for LVH

## 2019-03-18 NOTE — OCCUPATIONAL THERAPY INITIAL EVALUATION ADULT - PERTINENT HX OF CURRENT PROBLEM, REHAB EVAL
45 yo M w/ hx of HTN who p/w small L posterior external capsule/insular IPH w/ no mass effect or shift. Patient is neurologically intact on exam. 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 Patient will need further imaging prior to treatment consideration.

## 2019-03-18 NOTE — CHART NOTE - NSCHARTNOTEFT_GEN_A_CORE
Interventional Neuro Radiology  Pre-Procedure Note NP    HPI:  This is a 44yr old right handed  male PMH HTN tx from OSH, p/w HA. LKW 3/11 2230. Patient developed sudden onset HA(felt like baseball bat to the head) at 2230 on 3/11; 15 mins later he developed intractable N/V. Went to Urgent Care 3/12, sent home on tamiflu. HA/nausea/vomiting persisted, so patient went to [facility] ED where CT/CTA done showing IPH in L ventral parietal lobe rupturing into subarachnoid space, CTA head w/ findings concerning for Dang Dang. Transferred to [facility] for further workup. On initial evaluation at [facility], patient HA improved although with photophobia, no focal neurologic deficits, NIHSS 0. (16 Mar 2019 10:34)    Allergies: No Known Allergies    PAST MEDICAL & SURGICAL HISTORY:  HTN (hypertension)    Social History:     FAMILY HISTORY:    Current Medications:   amLODIPine   Tablet 10 milliGRAM(s) Oral daily  enoxaparin Injectable 40 milliGRAM(s) SubCutaneous daily  hydrALAZINE 25 milliGRAM(s) Oral every 8 hours  hydrALAZINE Injectable 5 milliGRAM(s) IV Push every 6 hours PRN  losartan 100 milliGRAM(s) Oral daily  ondansetron Injectable 4 milliGRAM(s) IV Push every 6 hours PRN  oxyCODONE    5 mG/acetaminophen 325 mG 1 Tablet(s) Oral every 6 hours PRN      Labs:                         15.6   13.7  )-----------( 226                  44.4         136  |  103  |  18  ----------------------------<  89  5.2   |  18<L>  |  0.97      Blood Bank: B Positive    Assessment/Plan:   This is a 44y  year old right  hand dominant Male  presents with intracerebral hemorrhage and CTA suspicious for dang dang disease. Patient presents to neuro-IR for selective cerebral angiography. Procedure, goals, risks, benefits and alternatives  were discussed with patient and patient's family.  All questions were answered.  Risks include but are not limited to stroke, vessel injury, hemorrhage, and or right  groin hematoma.  Patient demonstrates understanding  of all risks involved with this procedure and wishes to continue.   Appropriate  consent was obtained from patient and consent is in the patient's chart.

## 2019-03-18 NOTE — PRE-ANESTHESIA EVALUATION ADULT - NSANTHPMHFT_GEN_ALL_CORE
To OSH w HA N/V reid positive for IPH L parietal lobe NO mass effect No shift now neuro intact  ho HTN

## 2019-03-18 NOTE — DIETITIAN INITIAL EVALUATION ADULT. - PERTINENT MEDS FT
MEDICATIONS  (STANDING):  amLODIPine   Tablet 10 milliGRAM(s) Oral daily  hydrALAZINE 25 milliGRAM(s) Oral every 8 hours  losartan 100 milliGRAM(s) Oral daily  niCARdipine Infusion 5 mG/Hr (25 mL/Hr) IV Continuous <Continuous>    MEDICATIONS  (PRN):  hydrALAZINE Injectable 5 milliGRAM(s) IV Push every 6 hours PRN SBP >140  HYDROmorphone  Injectable 1 milliGRAM(s) IV Push every 4 hours PRN Pain  ondansetron Injectable 4 milliGRAM(s) IV Push every 6 hours PRN Nausea and/or Vomiting

## 2019-03-19 ENCOUNTER — TRANSCRIPTION ENCOUNTER (OUTPATIENT)
Age: 44
End: 2019-03-19

## 2019-03-19 LAB
ANA TITR SER: NEGATIVE — SIGNIFICANT CHANGE UP
HCYS SERPL-MCNC: 13 UMOL/L — SIGNIFICANT CHANGE UP

## 2019-03-19 PROCEDURE — 99233 SBSQ HOSP IP/OBS HIGH 50: CPT

## 2019-03-19 PROCEDURE — G0452: CPT | Mod: 26

## 2019-03-19 PROCEDURE — 78607: CPT | Mod: 26

## 2019-03-19 PROCEDURE — 83020 HEMOGLOBIN ELECTROPHORESIS: CPT | Mod: 26

## 2019-03-19 RX ORDER — AMLODIPINE BESYLATE 2.5 MG/1
1 TABLET ORAL
Qty: 0 | Refills: 0 | COMMUNITY

## 2019-03-19 RX ORDER — DOCUSATE SODIUM 100 MG
100 CAPSULE ORAL
Qty: 0 | Refills: 0 | Status: DISCONTINUED | OUTPATIENT
Start: 2019-03-19 | End: 2019-03-21

## 2019-03-19 RX ORDER — ACETAMINOPHEN 500 MG
650 TABLET ORAL EVERY 6 HOURS
Qty: 0 | Refills: 0 | Status: DISCONTINUED | OUTPATIENT
Start: 2019-03-19 | End: 2019-03-21

## 2019-03-19 RX ORDER — POLYETHYLENE GLYCOL 3350 17 G/17G
17 POWDER, FOR SOLUTION ORAL ONCE
Qty: 0 | Refills: 0 | Status: DISCONTINUED | OUTPATIENT
Start: 2019-03-19 | End: 2019-03-21

## 2019-03-19 RX ORDER — DOCUSATE SODIUM 100 MG
1 CAPSULE ORAL
Qty: 0 | Refills: 0 | COMMUNITY
Start: 2019-03-19

## 2019-03-19 RX ORDER — HYDRALAZINE HCL 50 MG
1 TABLET ORAL
Qty: 0 | Refills: 0 | COMMUNITY
Start: 2019-03-19

## 2019-03-19 RX ORDER — HYDRALAZINE HCL 50 MG
50 TABLET ORAL EVERY 12 HOURS
Qty: 0 | Refills: 0 | Status: DISCONTINUED | OUTPATIENT
Start: 2019-03-19 | End: 2019-03-21

## 2019-03-19 RX ORDER — POLYETHYLENE GLYCOL 3350 17 G/17G
17 POWDER, FOR SOLUTION ORAL
Qty: 0 | Refills: 0 | COMMUNITY
Start: 2019-03-19

## 2019-03-19 RX ORDER — ACETAMINOPHEN 500 MG
1000 TABLET ORAL ONCE
Qty: 0 | Refills: 0 | Status: COMPLETED | OUTPATIENT
Start: 2019-03-19 | End: 2019-03-19

## 2019-03-19 RX ORDER — POLYETHYLENE GLYCOL 3350 17 G/17G
17 POWDER, FOR SOLUTION ORAL
Qty: 0 | Refills: 0 | Status: DISCONTINUED | OUTPATIENT
Start: 2019-03-19 | End: 2019-03-21

## 2019-03-19 RX ADMIN — LOSARTAN POTASSIUM 100 MILLIGRAM(S): 100 TABLET, FILM COATED ORAL at 06:46

## 2019-03-19 RX ADMIN — Medication 400 MILLIGRAM(S): at 04:50

## 2019-03-19 RX ADMIN — Medication 100 MILLIGRAM(S): at 17:24

## 2019-03-19 RX ADMIN — Medication 1000 MILLIGRAM(S): at 05:20

## 2019-03-19 RX ADMIN — ONDANSETRON 4 MILLIGRAM(S): 8 TABLET, FILM COATED ORAL at 10:53

## 2019-03-19 RX ADMIN — Medication 50 MILLIGRAM(S): at 17:24

## 2019-03-19 RX ADMIN — Medication 650 MILLIGRAM(S): at 20:59

## 2019-03-19 RX ADMIN — Medication 25 MILLIGRAM(S): at 06:46

## 2019-03-19 RX ADMIN — Medication 650 MILLIGRAM(S): at 21:29

## 2019-03-19 RX ADMIN — POLYETHYLENE GLYCOL 3350 17 GRAM(S): 17 POWDER, FOR SOLUTION ORAL at 15:16

## 2019-03-19 RX ADMIN — ENOXAPARIN SODIUM 40 MILLIGRAM(S): 100 INJECTION SUBCUTANEOUS at 21:01

## 2019-03-19 RX ADMIN — AMLODIPINE BESYLATE 10 MILLIGRAM(S): 2.5 TABLET ORAL at 06:46

## 2019-03-19 NOTE — DISCHARGE NOTE PROVIDER - HOSPITAL COURSE
44 years old man with vascular risk factor of hypertension initially presented to urgent care center for evaluation of thunderclap headache on 3/12.  He was diagnosed to have "flu" and was discharged on Tamiflu. He presented to [facility] ED due to persistence of headache and was subsequently transferred to [facility] for further management. CT brain or admission showed left sylvian fissure subarachnoid hemorrhage and acute small ICH in the left posterior putamen/lentiform nuclei. CTA head to my eye shows large vessel intracranial vasculopathy involving left supraclinoid ICA and proximal MCA M1 segment (leading to critical stenosis versus occlusion) and was concerning for dilated lenticulostriate branches. TTE was grossly unremarkable and did not show any evidence of LVH.        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     NM Spect w/w/out Diamox ********** to determine cerebrovascular reserve to evaluate for his candidacy for STA-MCA Bypass for primary ischemic and secondary ICH stroke prevention, Physical therapy/OT eval for home with outpatient services.        Pt and family provided extensive education and couseling about the diagnosis and plan of care and their questions answered in detail by both teams. Pt/family advised to call 911 immediately in case of HA or numbness, weakness, feeling ill.         Pt neurologically stable for discharge with close outpatient follow up with stroke neurology and neurosurgery. 44 years old man with vascular risk factor of hypertension initially presented to urgent care center for evaluation of thunderclap headache on 3/12.  He was diagnosed to have "flu" and was discharged on Tamiflu. He presented to [facility] ED due to persistence of headache and was subsequently transferred to [facility] for further management. CT brain or admission showed left sylvian fissure subarachnoid hemorrhage and acute small ICH in the left posterior putamen/lentiform nuclei. CTA head to my eye shows large vessel intracranial vasculopathy involving left supraclinoid ICA and proximal MCA M1 segment (leading to critical stenosis versus occlusion) and was concerning for dilated lenticulostriate branches. TTE was grossly unremarkable and did not show any evidence of LVH.        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     NM Spect w/w/out Diamox was performed to determine cerebrovascular reserve to evaluate for his candidacy for STA-MCA Bypass for primary ischemic and secondary ICH stroke prevention. Diamox study indicative of good R sided compensation.    Physical therapy/OT eval for home with outpatient services.        Pt had mild sinus tachy cardia which responded to IVFs as pt reported poor fluid intake. CTA Chest was done which ruled out PE.         Pt and family provided extensive education and couseling about the diagnosis and plan of care and their questions answered in detail by both teams. Pt/family advised to call 911 immediately in case of HA or numbness, weakness, feeling ill.         Pt neurologically stable for discharge with close outpatient follow up with stroke neurology and neurosurgery and cardiology. 44 years old man with vascular risk factor of hypertension initially presented to urgent care center for evaluation of thunderclap headache on 3/12.  He was diagnosed to have "flu" and was discharged on Tamiflu. He presented to [facility] ED due to persistence of headache and was subsequently transferred to [facility] for further management. CT brain or admission showed left sylvian fissure subarachnoid hemorrhage and acute small ICH in the left posterior putamen/lentiform nuclei. CTA head to my eye shows large vessel intracranial vasculopathy involving left supraclinoid ICA and proximal MCA M1 segment (leading to critical stenosis versus occlusion) and was concerning for dilated lenticulostriate branches. TTE was grossly unremarkable and did not show any evidence of LVH.        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     NM Spect w/w/out Diamox was performed to determine cerebrovascular reserve to evaluate for his candidacy for STA-MCA Bypass for primary ischemic and secondary ICH stroke prevention. Diamox study indicative of good R sided compensation.    Physical therapy/OT eval for home with outpatient services.        Pt had mild sinus tachy cardia which responded to IVFs as pt reported poor fluid intake. CTA Chest which was negative for PE.        Pt and family provided extensive education and couseling about the diagnosis and plan of care and their questions answered in detail by both teams. Pt/family advised to call 911 immediately in case of HA or numbness, weakness, feeling ill.         Pt neurologically stable for discharge with close outpatient follow up with stroke neurology and neurosurgery and cardiology. Pt recommended for outpatient sleep study and number attached.

## 2019-03-19 NOTE — DISCHARGE NOTE PROVIDER - NSDCCPCAREPLAN_GEN_ALL_CORE_FT
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  - Please take your Blood pressure medications as indicated an keep systolic BP less than 140/90.   ***********  - Please follow up with stroke neurologist Dr. Iniguez in 1 week.   - Please call and make appointment with neurosurgeon Dr. Badillo within 1-2 weeks. You will likely need another MRI Brain to be setup before planned surgery.    *****  Continue taking medications as prescribed. Monitor your blood pressure. Reduce fat, cholesterol and salt in your diet. Increase intake of fruits and vegetables. Limit alcohol to minimum and do not smoke. You may be at risk for falling, make changes to your home to help you walk easier. Keep up to date on vaccinations.  If you experience any symptoms of facial drooping, slurred speech, arm or leg weakness, numbness, severe headache, vision changes or any worsening symptoms, notify provider immediatley and return to ER.      SECONDARY DISCHARGE DIAGNOSES  Diagnosis: HTN (hypertension)  Assessment and Plan of Treatment: - Its important to control your BP. Please follow closely with cardiologist and PCP.  - Please see your PCP to arrange for an outpatient sleep study to assess for sleep apnea which could worsen blood pressure and cause other complications. 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  - Please take your Blood pressure medications as indicated an keep systolic BP less than 140/90.   ***********  - Please follow up with stroke neurologist Dr. Iniguez in 1 week.   - Please call and make appointment with neurosurgeon Dr. Badillo within 1-2 weeks. You will likely need another MRI Brain to be setup before planned surgery.    *****  Continue taking medications as prescribed. Monitor your blood pressure. Reduce fat, cholesterol and salt in your diet. Increase intake of fruits and vegetables. Limit alcohol to minimum and do not smoke. You may be at risk for falling, make changes to your home to help you walk easier. Keep up to date on vaccinations.  If you experience any symptoms of facial drooping, slurred speech, arm or leg weakness, numbness, severe headache, vision changes or any worsening symptoms, notify provider immediatley and return to ER.      SECONDARY DISCHARGE DIAGNOSES  Diagnosis: HTN (hypertension)  Assessment and Plan of Treatment: - Its important to control your BP. Please follow closely with cardiologist Dr. Quintana and PCP.  - Please see your PCP to arrange for an outpatient sleep study to assess for sleep apnea which could worsen blood pressure and cause other complications. 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  - Please take your Blood pressure medications as indicated an keep systolic BP less than 140/90.   ***********  - Please follow up with stroke neurologist Dr. Iniguez in 1 week.   - Please call and make appointment with neurosurgeon Dr. Badillo within 1-2 weeks. You will likely need another MRI Brain to be setup before planned surgery.    *****  Continue taking medications as prescribed. Monitor your blood pressure. Reduce fat, cholesterol and salt in your diet. Increase intake of fruits and vegetables. Limit alcohol to minimum and do not smoke. You may be at risk for falling, make changes to your home to help you walk easier. Keep up to date on vaccinations.  If you experience any symptoms of facial drooping, slurred speech, arm or leg weakness, numbness, severe headache, vision changes or any worsening symptoms, notify provider immediatley and return to ER.      SECONDARY DISCHARGE DIAGNOSES  Diagnosis: Moyamoya syndrome  Assessment and Plan of Treatment: follow up with neurology and neurosurgery    Diagnosis: HTN (hypertension)  Assessment and Plan of Treatment: - Its important to control your BP. Please follow closely with cardiologist Dr. Quintana and PCP.  - Please see your PCP to arrange for an outpatient sleep study to assess for sleep apnea which could worsen blood pressure and cause other complications. 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  - Please take your Blood pressure medications as indicated an keep systolic BP less than 140/90. Ideal range would be systolic 110-140.     ***********  - Please follow up with stroke neurologist Dr. Iniguez in 1 week.   - Please call and make appointment with neurosurgeon Dr. Badillo within 1-2 weeks. You will likely need another MRI Brain to be setup before planned surgery.    *****  Continue taking medications as prescribed. Monitor your blood pressure. Reduce fat, cholesterol and salt in your diet. Increase intake of fruits and vegetables. Limit alcohol to minimum and do not smoke. You may be at risk for falling, make changes to your home to help you walk easier. Keep up to date on vaccinations.  If you experience any symptoms of facial drooping, slurred speech, arm or leg weakness, numbness, severe headache, vision changes or any worsening symptoms, notify provider immediatley and return to ER.      SECONDARY DISCHARGE DIAGNOSES  Diagnosis: Moyamoya syndrome  Assessment and Plan of Treatment: follow up with neurology and neurosurgery    Diagnosis: HTN (hypertension)  Assessment and Plan of Treatment: - Its important to control your BP. Please follow closely with cardiologist Dr. Quintana and PCP.  - Please see your PCP to arrange for an outpatient sleep study to assess for sleep apnea which could worsen blood pressure and cause other complications.

## 2019-03-19 NOTE — DISCHARGE NOTE PROVIDER - CARE PROVIDERS DIRECT ADDRESSES
,[email or web address] ,[email or web address],[email or web address],[email or web address]

## 2019-03-19 NOTE — PROGRESS NOTE ADULT - SUBJECTIVE AND OBJECTIVE BOX
THE PATIENT WAS SEEN AND EXAMINED BY ME WITH THE HOUSESTAFF AND STROKE TEAM DURING MORNING ROUNDS.   HPI:  45yo RH  male PMH HTN tx from OSH, p/w HA. LKW 3/11 2230. Patient developed sudden onset HA(felt like baseball bat to the head) at 2230 on 3/11; 15 mins later he developed intractable N/V. Went to Urgent Care 3/12, sent home on tamiflu. HA/nausea/vomiting persisted, so patient went to [facility] ED where CT/CTA done showing IPH in L ventral parietal lobe rupturing into subarachnoid space, CTA head w/ findings concerning for Flores Flores. Transferred to [facility] for further workup. On initial evaluation at [facility], patient HA improved although with photophobia, no focal neurologic deficits, NIHSS 0.     SUBJECTIVE: No events overnight.  No new neurologic complaints.      amLODIPine   Tablet 10 milliGRAM(s) Oral daily  enoxaparin Injectable 40 milliGRAM(s) SubCutaneous daily  hydrALAZINE 25 milliGRAM(s) Oral every 8 hours  hydrALAZINE Injectable 5 milliGRAM(s) IV Push every 6 hours PRN  losartan 100 milliGRAM(s) Oral daily  ondansetron Injectable 4 milliGRAM(s) IV Push every 6 hours PRN  oxyCODONE    5 mG/acetaminophen 325 mG 1 Tablet(s) Oral every 6 hours PRN      PHYSICAL EXAM:   Vital Signs Last 24 Hrs  T(C): 37.1 (18 Mar 2019 19:24), Max: 37.1 (18 Mar 2019 19:24)  T(F): 98.7 (18 Mar 2019 19:24), Max: 98.7 (18 Mar 2019 19:24)  HR: 72 (19 Mar 2019 06:00) (61 - 99)  BP: 139/92 (19 Mar 2019 06:00) (120/70 - 148/81)  BP(mean): 108 (19 Mar 2019 06:00) (85 - 109)  RR: 14 (19 Mar 2019 06:00) (14 - 20)  SpO2: 97% (19 Mar 2019 06:00) (95% - 98%)    General: No acute distress  HEENT: EOM intact, visual fields full  Abdomen: Soft, nontender, nondistended   Extremities: No edema    NEUROLOGICAL EXAM:  Mental status: Awake, alert, oriented x3, no aphasia, no neglect, normal memory   Cranial Nerves: No facial asymmetry, no nystagmus, no dysarthria,  tongue midline  Motor exam: Normal tone, no drift, 5/5 RUE, 5/5 RLE, 5/5 LUE, 5/5 LLE, normal fine finger movements.  Sensation: Intact to light touch   Coordination/ Gait: No dysmetria, SHELLIE intact and symmetric bilaterally  LABS:   03-18    136  |  103  |  18  ----------------------------<  89  5.2   |  18<L>  |  0.97    Ca    9.3      18 Mar 2019 07:11      Hemoglobin A1C, Whole Blood: 4.6 % (03-17 @ 08:50)      IMAGING: Reviewed by me.   CT HEAD (03/16): In comparison with prior report from 3/16/2019 at 5:00 AM redemonstration   of a left 1.1 x 1.1 cm intraparenchymal hemorrhage at the left posterior    limb of the left internal capsule, putamen and left external capsule,   edema and subarachnoid hemorrhage, no new midline shift. Basal cisterns   remain patent.    CT HEAD (03/16): Acute intraparenchymal hemorrhage in the ventral left parietal lobe   measuring 1.9 x 1 x 1 cm appears to have ruptured into the subarachnoid   space.  There is mild subarachnoid hemorrhage in the left sylvian fissure   and trace subarachnoid hemorrhage in the left frontal and parietal   regions.    CT Head No Cont (03.17.19)   Unchanged left intraparenchymal hemorrhage involving the posterior limb   of the left internal capsule, left external capsule and putamen with   surrounding edema and small associated subarachnoid hemorrhage. No   midline shift.  No new areas of hemorrhage. No hydrocephalus.    MRI/ MR Angio Neck Head No Cont (03.18.19)   1. Evidence of the left supraclinoid carotid occlusion is noted with the   presence of a hemorrhage in the distal left internal capsule region in   the posterior subinsular region.  2. MRA of the [place] demonstrates a left supraclinoid   occlusion. The left A2 is fed from the right. Evidence of   collateralization with increased flow in the left A2 and left PCA as   indicated on the  NOVA study. No left M1 is visualized. The more distal   MCA on the left likely fills from collateralization via the hypertrophied    lenticulostriates visualized on patient's catheter angiogram 3/18/2019   as well as the leptomeningeal collaterals. Nova report is included for   review. THE PATIENT WAS SEEN AND EXAMINED BY ME WITH THE HOUSESTAFF AND STROKE TEAM DURING MORNING ROUNDS.   HPI:  43yo RH  male PMH HTN tx from OSH, p/w HA. LKW 3/11 2230. Patient developed sudden onset HA(felt like baseball bat to the head) at 2230 on 3/11; 15 mins later he developed intractable N/V. Went to Urgent Care 3/12, sent home on tamiflu. HA/nausea/vomiting persisted, so patient went to [facility] ED where CT/CTA done showing IPH in L ventral parietal lobe rupturing into subarachnoid space, CTA head w/ findings concerning for Flores Flores. Transferred to [facility] for further workup. On initial evaluation at [facility], patient HA improved although with photophobia, no focal neurologic deficits, NIHSS 0.     SUBJECTIVE: No events overnight.  No new neurologic complaints.      amLODIPine   Tablet 10 milliGRAM(s) Oral daily  enoxaparin Injectable 40 milliGRAM(s) SubCutaneous daily  hydrALAZINE 25 milliGRAM(s) Oral every 8 hours  hydrALAZINE Injectable 5 milliGRAM(s) IV Push every 6 hours PRN  losartan 100 milliGRAM(s) Oral daily  ondansetron Injectable 4 milliGRAM(s) IV Push every 6 hours PRN  oxyCODONE    5 mG/acetaminophen 325 mG 1 Tablet(s) Oral every 6 hours PRN      PHYSICAL EXAM:   Vital Signs Last 24 Hrs  T(C): 37.1 (18 Mar 2019 19:24), Max: 37.1 (18 Mar 2019 19:24)  T(F): 98.7 (18 Mar 2019 19:24), Max: 98.7 (18 Mar 2019 19:24)  HR: 72 (19 Mar 2019 06:00) (61 - 99)  BP: 139/92 (19 Mar 2019 06:00) (120/70 - 148/81)  BP(mean): 108 (19 Mar 2019 06:00) (85 - 109)  RR: 14 (19 Mar 2019 06:00) (14 - 20)  SpO2: 97% (19 Mar 2019 06:00) (95% - 98%)    General: No acute distress  HEENT: EOM intact, visual fields full  Abdomen: Soft, nontender, nondistended   Extremities: No edema, right groin site with no active bleeding ,no hematoma, soft, 2+ pedal pulses, temp and color symmetric b/l    NEUROLOGICAL EXAM:  Mental status: Awake, alert, oriented x3, no aphasia, no neglect, normal memory   Cranial Nerves: No facial asymmetry, no nystagmus, no dysarthria,  tongue midline  Motor exam: Normal tone, no drift, 5/5 RUE, 5/5 RLE, 5/5 LUE, 5/5 LLE, normal fine finger movements.  Sensation: Intact to light touch   Coordination/ Gait: No dysmetria, SHELLIE intact and symmetric bilaterally  LABS:   03-18    136  |  103  |  18  ----------------------------<  89  5.2   |  18<L>  |  0.97    Ca    9.3      18 Mar 2019 07:11      Hemoglobin A1C, Whole Blood: 4.6 % (03-17 @ 08:50)      IMAGING: Reviewed by me.   CT HEAD (03/16): In comparison with prior report from 3/16/2019 at 5:00 AM redemonstration   of a left 1.1 x 1.1 cm intraparenchymal hemorrhage at the left posterior    limb of the left internal capsule, putamen and left external capsule,   edema and subarachnoid hemorrhage, no new midline shift. Basal cisterns   remain patent.    CT HEAD (03/16): Acute intraparenchymal hemorrhage in the ventral left parietal lobe   measuring 1.9 x 1 x 1 cm appears to have ruptured into the subarachnoid   space.  There is mild subarachnoid hemorrhage in the left sylvian fissure   and trace subarachnoid hemorrhage in the left frontal and parietal   regions.    CT Head No Cont (03.17.19)   Unchanged left intraparenchymal hemorrhage involving the posterior limb   of the left internal capsule, left external capsule and putamen with   surrounding edema and small associated subarachnoid hemorrhage. No   midline shift.  No new areas of hemorrhage. No hydrocephalus.    MRI/ MR Angio Neck Head No Cont (03.18.19)   1. Evidence of the left supraclinoid carotid occlusion is noted with the   presence of a hemorrhage in the distal left internal capsule region in   the posterior subinsular region.  2. MRA of the [place] demonstrates a left supraclinoid   occlusion. The left A2 is fed from the right. Evidence of   collateralization with increased flow in the left A2 and left PCA as   indicated on the  NOVA study. No left M1 is visualized. The more distal   MCA on the left likely fills from collateralization via the hypertrophied    lenticulostriates visualized on patient's catheter angiogram 3/18/2019   as well as the leptomeningeal collaterals. [facility] report is included for   review. THE PATIENT WAS SEEN AND EXAMINED BY ME WITH THE HOUSESTAFF AND STROKE TEAM DURING MORNING ROUNDS.     HPI:  45yo RH  male PMH HTN tx from OSH, p/w HA. LKW 3/11 2230. Patient developed sudden onset HA(felt like baseball bat to the head) at 2230 on 3/11; 15 mins later he developed intractable N/V. Went to Urgent Care 3/12, sent home on tamiflu. HA/nausea/vomiting persisted, so patient went to [facility] ED where CT/CTA done showing IPH in L ventral parietal lobe rupturing into subarachnoid space, CTA head w/ findings concerning for Flores Flores. Transferred to [facility] for further workup. On initial evaluation at [facility], patient HA improved although with photophobia, no focal neurologic deficits, NIHSS 0.     SUBJECTIVE: No events overnight.  No new neurologic complaints.      ROS: All negative except documented above.    amLODIPine   Tablet 10 milliGRAM(s) Oral daily  enoxaparin Injectable 40 milliGRAM(s) SubCutaneous daily  hydrALAZINE 25 milliGRAM(s) Oral every 8 hours  hydrALAZINE Injectable 5 milliGRAM(s) IV Push every 6 hours PRN  losartan 100 milliGRAM(s) Oral daily  ondansetron Injectable 4 milliGRAM(s) IV Push every 6 hours PRN  oxyCODONE    5 mG/acetaminophen 325 mG 1 Tablet(s) Oral every 6 hours PRN      PHYSICAL EXAM:   Vital Signs Last 24 Hrs  T(C): 37.1 (18 Mar 2019 19:24), Max: 37.1 (18 Mar 2019 19:24)  T(F): 98.7 (18 Mar 2019 19:24), Max: 98.7 (18 Mar 2019 19:24)  HR: 72 (19 Mar 2019 06:00) (61 - 99)  BP: 139/92 (19 Mar 2019 06:00) (120/70 - 148/81)  BP(mean): 108 (19 Mar 2019 06:00) (85 - 109)  RR: 14 (19 Mar 2019 06:00) (14 - 20)  SpO2: 97% (19 Mar 2019 06:00) (95% - 98%)    General: No acute distress  HEENT: EOM intact, visual fields full  Abdomen: Soft, nontender, nondistended   Extremities: No edema, right groin site with no active bleeding ,no hematoma, soft, 2+ pedal pulses, temp and color symmetric b/l    NEUROLOGICAL EXAM:  Mental status: Awake, alert, oriented x3, no aphasia, no neglect, normal memory   Cranial Nerves: No facial asymmetry, no nystagmus, no dysarthria,  tongue midline  Motor exam: Normal tone, no drift, 5/5 RUE, 5/5 RLE, 5/5 LUE, 5/5 LLE, normal fine finger movements.  Sensation: Intact to light touch   Coordination/ Gait: No dysmetria, SHELLIE intact and symmetric bilaterally  LABS:   03-18    136  |  103  |  18  ----------------------------<  89  5.2   |  18<L>  |  0.97    Ca    9.3      18 Mar 2019 07:11      Hemoglobin A1C, Whole Blood: 4.6 % (03-17 @ 08:50)      IMAGING: Reviewed by me.   CT HEAD (03/16): In comparison with prior report from 3/16/2019 at 5:00 AM redemonstration   of a left 1.1 x 1.1 cm intraparenchymal hemorrhage at the left posterior    limb of the left internal capsule, putamen and left external capsule,   edema and subarachnoid hemorrhage, no new midline shift. Basal cisterns   remain patent.    CT HEAD (03/16): Acute intraparenchymal hemorrhage in the ventral left parietal lobe   measuring 1.9 x 1 x 1 cm appears to have ruptured into the subarachnoid   space.  There is mild subarachnoid hemorrhage in the left sylvian fissure   and trace subarachnoid hemorrhage in the left frontal and parietal   regions.    CT Head No Cont (03.17.19)   Unchanged left intraparenchymal hemorrhage involving the posterior limb   of the left internal capsule, left external capsule and putamen with   surrounding edema and small associated subarachnoid hemorrhage. No   midline shift.  No new areas of hemorrhage. No hydrocephalus.    MRI/ MR Angio Neck Head No Cont (03.18.19)   1. Evidence of the left supraclinoid carotid occlusion is noted with the   presence of a hemorrhage in the distal left internal capsule region in   the posterior subinsular region.  2. MRA of the [place] demonstrates a left supraclinoid   occlusion. The left A2 is fed from the right. Evidence of   collateralization with increased flow in the left A2 and left PCA as   indicated on the  [facility]OVA study. No left M1 is visualized. The more distal   MCA on the left likely fills from collateralization via the hypertrophied    lenticulostriates visualized on patient's catheter angiogram 3/18/2019   as well as the leptomeningeal collaterals. [facility] report is included for   review.

## 2019-03-19 NOTE — PROGRESS NOTE ADULT - ASSESSMENT
44 years old man with vascular risk factor of hypertension initially presented to urgent care center for evaluation of thunderclap headache on 3/12.  He was diagnosed to have "flu" and was discharged on Tamiflu. He presented to [facility] ED due to persistence of headache and was subsequently transferred to [facility] for further management. CT brain or admission showed left sylvian fissure subarachnoid hemorrhage and acute small ICH in the left posterior putamen/lentiform nuclei. CTA head to my eye shows large vessel intracranial vasculopathy involving left supraclinoid ICA and proximal MCA M1 segment (leading to critical stenosis versus occlusion) and was concerning for dilated lenticulostriate branches. TTE was grossly unremarkable and did not show any evidence of LVH.    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    ANTITHROMBOTIC THERAPY: None in the setting of ICH and no specific indications at this time     PULMONARY:  protecting airway, saturating well, ANITA workup as outpatient     CARDIOVASCULAR:  TTE: ef 68%, borderline pulmonary htn, cardiac monitoring without acute events noted                           SBP goal: very gradual normotension in the setting of large vessel intracranial vasculopathy    GASTROINTESTINAL:  dysphagia screen - passed, tolerating Regular diet       Diet: Regular    RENAL: BUN/Cr without acute change, good urine output, maintain adequate hydration      Na Goal: Greater than 135     Dorsey: N    HEMATOLOGY: H/H without acute change, no active signs of bleeding, Hypercoag panel pending. hemoglobin electrophoresis to rule out possibility of sickle cell disease as it has been associated with moyamoya syndrome      DVT ppx: lovenox considering clinical and radiological stability more than 48 hours from the ICH     ID: afebrile, leukocytosis - no overt si/sx of infection, will continue to monitor, RVP negative     OTHER: Plan of care discussed with patient and wife at bedside. All the questions were answered and concerns were addressed.     DISPOSITION: Home with outpatient services anticipated for 3/20.    CORE MEASURES:        Admission NIHSS: 0     TPA: [] YES [x] NO      LDL/HDL: 85/38     Depression Screen: 0, upset about current condition- support provided, continue to monitor.      Statin Therapy: N     Dysphagia Screen: [x] PASS [] FAIL     Smoking [] YES [x] NO      Afib [] YES [x] NO     Stroke Education [x] YES [] NO 44 years old man with vascular risk factor of hypertension initially presented to urgent care center for evaluation of thunderclap headache on 3/12.  He was diagnosed to have "flu" and was discharged on Tamiflu. He presented to [facility] ED due to persistence of headache and was subsequently transferred to [facility] for further management. CT brain or admission showed left sylvian fissure subarachnoid hemorrhage and acute small ICH in the left posterior putamen/lentiform nuclei. CTA head to my eye shows large vessel intracranial vasculopathy involving left supraclinoid ICA and proximal MCA M1 segment (leading to critical stenosis versus occlusion) and was concerning for dilated lenticulostriate branches. TTE was grossly unremarkable and did not show any evidence of LVH. 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    Impression:  Left sylvian fissure SAH and posterior putaminal/lentiform nuclei ICH - likely etiology being rupture of dilated moyamoya type collateral blood vessels in the setting of large vessel intracranial vasculopathy/steno-occlusive disease - i.e. 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    ANTITHROMBOTIC THERAPY: None in the setting of ICH and no specific indications at this time     PULMONARY:  protecting airway, saturating well, ANITA workup as outpatient     CARDIOVASCULAR:  TTE: ef 68%, borderline pulmonary htn, cardiac monitoring without acute events noted                           SBP goal: very gradual normotension in the setting of large vessel intracranial vasculopathy    GASTROINTESTINAL:  dysphagia screen - passed, tolerating Regular diet       Diet: Regular    RENAL: BUN/Cr without acute change, good urine output, maintain adequate hydration      Na Goal: Greater than 135     Dorsey: N    HEMATOLOGY: H/H without acute change, no active signs of bleeding, Hypercoag panel pending especially antiphospholipid antibodies. Hemoglobin electrophoresis to rule out possibility of sickle cell disease as it has been associated with moyamoya syndrome      DVT ppx: lovenox considering clinical and radiological stability more than 48 hours from the ICH     ID: afebrile, leukocytosis - no overt si/sx of infection, will continue to monitor, RVP negative     OTHER: Plan of care discussed with patient and wife at bedside. All the questions were answered and concerns were addressed.     DISPOSITION: Home with outpatient services anticipated for 3/20.    CORE MEASURES:        Admission NIHSS: 0     TPA: [] YES [x] NO      LDL/HDL: 85/38     Depression Screen: 0, upset about current condition- support provided, continue to monitor.      Statin Therapy: N     Dysphagia Screen: [x] PASS [] FAIL     Smoking [] YES [x] NO      Afib [] YES [x] NO     Stroke Education [x] YES [] NO

## 2019-03-19 NOTE — DISCHARGE NOTE PROVIDER - PROVIDER TOKENS
PROVIDER:[TOKEN:[[id number]]],PROVIDER:[TOKEN:[[id number]]],PROVIDER:[TOKEN:[[id number]]] PROVIDER:[TOKEN:[[id number]]],PROVIDER:[TOKEN:[[id number]]],PROVIDER:[TOKEN:[[id number]]],PROVIDER:[TOKEN:[[id number]]]

## 2019-03-19 NOTE — DISCHARGE NOTE PROVIDER - CARE PROVIDER_API CALL
Gerardo Iniguez)  Neurology; Vascular Neurology  [address], [address]  [place] [address]  Phone: [phone]  Fax: [phone]  Follow Up Time:     Brien Badillo)  Neurological Surgery  [address] [place] [address]  Phone: [phone]  Fax: [phone]  Follow Up Time:     Jed Quintana)  Cardiology; Internal Medicine  [address] [facility] [address], [address]  [place] [address]  Phone: [phone]  Fax: [phone]  Follow Up Time: Gerardo Iniguez (MD)  Neurology; Vascular Neurology  [address], [address]  [place] [address]  Phone: [phone]  Fax: [phone]  Follow Up Time:     Brien Badillo)  Neurological Surgery  [address] [place] [address]  Phone: [phone]  Fax: [phone]  Follow Up Time:     Jed Quintana)  Cardiology; Internal Medicine  [address], [address]  [place] [address]  Phone: [phone]  Fax: [phone]  Follow Up Time:     Stoney Pate)  Internal Medicine; Pulmonary Disease; Sleep Medicine  [address]  [place] [address]  Phone: [phone]  Fax: [phone]  Follow Up Time:

## 2019-03-19 NOTE — PROGRESS NOTE ADULT - ASSESSMENT
43 yo male admitted to the Stroke unit with intracranial hemorrhage in setting of hypertensive emergency   Increase Hydralazine 50 mg BID and Cont  norvasc 10 mg daily , Losartan 100 mg   Nsx or Neurology follow up. s/p angiogram yesterday. Flores Flores   / bypass surgery. Nsx follow up  Outpatient sleep study

## 2019-03-19 NOTE — PROGRESS NOTE ADULT - SUBJECTIVE AND OBJECTIVE BOX
Subjective: Patient seen and examined. No new events except as noted.   remains in Stroke unit   Headache resolved   s/p cerebral angiogram yesterday. Sandra Flores   No cp or sob   Wife at bedside     REVIEW OF SYSTEMS:    CONSTITUTIONAL: + weakness, fevers or chills  EYES/ENT: No visual changes;  No vertigo or throat pain   NECK: No pain or stiffness  RESPIRATORY: No cough, wheezing, hemoptysis; No shortness of breath  CARDIOVASCULAR: No chest pain or palpitations  GASTROINTESTINAL: No abdominal or epigastric pain. No nausea, vomiting, or hematemesis; No diarrhea or constipation. No melena or hematochezia.  GENITOURINARY: No dysuria, frequency or hematuria  NEUROLOGICAL: No numbness or weakness  SKIN: No itching, burning, rashes, or lesions   All other review of systems is negative unless indicated above.    MEDICATIONS:  MEDICATIONS  (STANDING):  amLODIPine   Tablet 10 milliGRAM(s) Oral daily  docusate sodium 100 milliGRAM(s) Oral two times a day  enoxaparin Injectable 40 milliGRAM(s) SubCutaneous daily  hydrALAZINE 25 milliGRAM(s) Oral every 8 hours  losartan 100 milliGRAM(s) Oral daily  polyethylene glycol 3350 17 Gram(s) Oral two times a day      PHYSICAL EXAM:  T(C): 36.9 (03-19-19 @ 07:57), Max: 37.1 (03-18-19 @ 19:24)  HR: 88 (03-19-19 @ 10:00) (68 - 99)  BP: 144/87 (03-19-19 @ 10:00) (126/83 - 154/98)  RR: 19 (03-19-19 @ 10:00) (12 - 20)  SpO2: 95% (03-19-19 @ 10:00) (95% - 98%)  Wt(kg): --  I&O's Summary    18 Mar 2019 07:01  -  19 Mar 2019 07:00  --------------------------------------------------------  IN: 0 mL / OUT: 600 mL / NET: -600 mL          Appearance: NAD	  HEENT:   Normal oral mucosa, PERRL, EOMI	  Lymphatic: No lymphadenopathy , no edema  Cardiovascular: Normal S1 S2, No JVD, No murmurs , Peripheral pulses palpable 2+ bilaterally  Respiratory: Lungs clear to auscultation, normal effort 	  Gastrointestinal:  Soft, Non-tender, + BS	  Skin: No rashes, No ecchymoses, No cyanosis, warm to touch  Musculoskeletal: Normal range of motion, normal strength  Psychiatry:  Mood & affect appropriate  Ext: No edema      LABS:    CARDIAC MARKERS:            03-18    136  |  103  |  18  ----------------------------<  89  5.2   |  18<L>  |  0.97    Ca    9.3      18 Mar 2019 07:11      proBNP:   Lipid Profile:   HgA1c:   TSH:             TELEMETRY: SR	    ECG:  	  RADIOLOGY:   DIAGNOSTIC TESTING:  [ ] Echocardiogram:  [ ]  Catheterization:  < from: MR Angio Head No Cont (03.18.19 @ 14:03) >    EXAM:  MR ANGIO NECK WAW IC                          EXAM:  MR ANGIO BRAIN                          EXAM:  MR BRAIN WAW IC                            PROCEDURE DATE:  03/18/2019            INTERPRETATION:  HISTORY: Patient with intracranial hemorrhage. Headache   for 2 days with nausea and vomiting.    MRI OF THE BRAIN, MRA OF THE [place] OF [place], AND MRA OF THE CERVICAL   ARTERIES:    TECHNIQUE:     The examination consists of:  1) Axial SPGR   2) Axial FSE T2-weighted   3) Axial FLAIR  4) Sagittal T1 FLAIR  5) Axial GRE and SWI  6) Axial diffusion-weighted   7) 3D time-of-flight MRA of the [place] of [place]  8) 2D time-of-flight MRA of the cervical arteries   9) 3D MRA of the carotid artery bifurcations  10) Coronal contrast-enhanced MRA of cervical arteries and [place] of   [place] (10cc Gadavist, 0 cc discarded)  11) Axial T1-weighted post-contrast  12) NOVA performed as per request    Study is compared with CT brain 3/17/2019 and catheter angiogram 3/18/2019      FINDINGS:    Evidence of the patient's hemorrhage in the distal left internal capsule   in a posterior subinsular location is again recognized as seen on the   patient's prior CT study. Trace intraventricular hemorrhage is seen in   the a subdural horn of the lateral ventricle. Subarachnoid hemorrhage is   seen in the left insular region predominantly but extending up to the   cerebral convex in the SI demonstrated on the FLAIR most conspicuously.  Evaluation of the posterior fossa is unremarkable.  The sella turcica  region is unremarkable.      The MR angiographic evaluation of the [place] demonstrates the   internal carotid arteries to be patent. There is a left ICA supraclinoid   subtotal occlusion appreciated. The left VON territory appears  to fill   from the right. A left P-comm is appreciated. No left M1 is appreciated.   Some flow in the distal left MCA vasculature is noted likely on the basis   of leptomeningeal collateralization as well as lenticulostriate   hypertrophy. There is a prominent left PCA and VON appreciated consistent   with leptomeningeal collateralization.     The MR angiographic evaluation of the cervical arteries demonstrates the   internal carotid arteries and vertebral arteries to be patent.      Nova report is included in the available series menu    IMPRESSION:      1. Evidence of the left supraclinoid carotid occlusion is noted with the   presence of a hemorrhage in the distal left internal capsule region in   the posterior subinsular region.  2. MRA of the [place] demonstrates a left supraclinoid   occlusion. The left A2 is fed from the right. Evidence of   collateralization with increased flow in the left A2 and left PCA as   indicated on the  NOVA study. No left M1 is visualized. The more distal   MCA on the left likely fills from collateralization via the hypertrophied    lenticulostriates visualized on patient's catheter angiogram 3/18/2019   as well as the leptomeningeal collaterals. Nova report is included for   review.                    EVERTON DEVLIN M.D., ATTENDING RADIOLOGIST  This document has been electronically signed. Mar 18 2019  3:01PM                < end of copied text >    [ ] Stress Test:    OTHER:

## 2019-03-20 LAB
AT III ACT/NOR PPP CHRO: 117 % — SIGNIFICANT CHANGE UP (ref 85–135)
DRVVT SCREEN TO CONFIRM RATIO: SIGNIFICANT CHANGE UP
HCT VFR BLD CALC: 47.5 % — SIGNIFICANT CHANGE UP (ref 39–50)
HGB BLD-MCNC: 15.8 G/DL — SIGNIFICANT CHANGE UP (ref 13–17)
LA NT DPL PPP QL: 30.7 SEC — SIGNIFICANT CHANGE UP
MCHC RBC-ENTMCNC: 30.6 PG — SIGNIFICANT CHANGE UP (ref 27–34)
MCHC RBC-ENTMCNC: 33.3 GM/DL — SIGNIFICANT CHANGE UP (ref 32–36)
MCV RBC AUTO: 91.9 FL — SIGNIFICANT CHANGE UP (ref 80–100)
PLATELET # BLD AUTO: 255 K/UL — SIGNIFICANT CHANGE UP (ref 150–400)
PROT C ACT/NOR PPP: 148 % — SIGNIFICANT CHANGE UP (ref 74–150)
RBC # BLD: 5.17 M/UL — SIGNIFICANT CHANGE UP (ref 4.2–5.8)
RBC # FLD: 13.1 % — SIGNIFICANT CHANGE UP (ref 10.3–14.5)
WBC # BLD: 12.46 K/UL — HIGH (ref 3.8–10.5)
WBC # FLD AUTO: 12.46 K/UL — HIGH (ref 3.8–10.5)

## 2019-03-20 PROCEDURE — 99233 SBSQ HOSP IP/OBS HIGH 50: CPT

## 2019-03-20 RX ORDER — SODIUM CHLORIDE 9 MG/ML
500 INJECTION INTRAMUSCULAR; INTRAVENOUS; SUBCUTANEOUS ONCE
Qty: 0 | Refills: 0 | Status: COMPLETED | OUTPATIENT
Start: 2019-03-20 | End: 2019-03-20

## 2019-03-20 RX ORDER — SODIUM CHLORIDE 9 MG/ML
1000 INJECTION INTRAMUSCULAR; INTRAVENOUS; SUBCUTANEOUS
Qty: 0 | Refills: 0 | Status: DISCONTINUED | OUTPATIENT
Start: 2019-03-20 | End: 2019-03-21

## 2019-03-20 RX ORDER — PSYLLIUM SEED (WITH DEXTROSE)
1 POWDER (GRAM) ORAL DAILY
Qty: 0 | Refills: 0 | Status: DISCONTINUED | OUTPATIENT
Start: 2019-03-20 | End: 2019-03-21

## 2019-03-20 RX ADMIN — POLYETHYLENE GLYCOL 3350 17 GRAM(S): 17 POWDER, FOR SOLUTION ORAL at 17:20

## 2019-03-20 RX ADMIN — Medication 650 MILLIGRAM(S): at 06:17

## 2019-03-20 RX ADMIN — Medication 100 MILLIGRAM(S): at 06:17

## 2019-03-20 RX ADMIN — Medication 50 MILLIGRAM(S): at 06:17

## 2019-03-20 RX ADMIN — Medication 650 MILLIGRAM(S): at 21:06

## 2019-03-20 RX ADMIN — AMLODIPINE BESYLATE 10 MILLIGRAM(S): 2.5 TABLET ORAL at 06:17

## 2019-03-20 RX ADMIN — Medication 1 PACKET(S): at 14:47

## 2019-03-20 RX ADMIN — Medication 650 MILLIGRAM(S): at 06:47

## 2019-03-20 RX ADMIN — Medication 100 MILLIGRAM(S): at 17:20

## 2019-03-20 RX ADMIN — SODIUM CHLORIDE 125 MILLILITER(S): 9 INJECTION INTRAMUSCULAR; INTRAVENOUS; SUBCUTANEOUS at 12:47

## 2019-03-20 RX ADMIN — SODIUM CHLORIDE 1500 MILLILITER(S): 9 INJECTION INTRAMUSCULAR; INTRAVENOUS; SUBCUTANEOUS at 10:15

## 2019-03-20 RX ADMIN — Medication 50 MILLIGRAM(S): at 17:22

## 2019-03-20 RX ADMIN — Medication 650 MILLIGRAM(S): at 21:36

## 2019-03-20 RX ADMIN — LOSARTAN POTASSIUM 100 MILLIGRAM(S): 100 TABLET, FILM COATED ORAL at 06:17

## 2019-03-20 RX ADMIN — ENOXAPARIN SODIUM 40 MILLIGRAM(S): 100 INJECTION SUBCUTANEOUS at 21:06

## 2019-03-20 NOTE — CHART NOTE - NSCHARTNOTEFT_GEN_A_CORE
Consult received for nutrition education.    Discussed low sodium diet w/ pt and wife. Food sources of high and low sodium, alternative low Na seasonings, sample meal pattern reviewed. Questions answered. Copy of diet provided. Wife stated she would like to follow up with outpt CDN

## 2019-03-20 NOTE — PROGRESS NOTE ADULT - SUBJECTIVE AND OBJECTIVE BOX
Subjective: Patient seen and examined. No new events except as noted.   remains in Stroke unit   feels weak and tired   neck pain   no cp or sob     REVIEW OF SYSTEMS:    CONSTITUTIONAL: + weakness, fevers or chills  EYES/ENT: No visual changes;  No vertigo or throat pain   NECK: No pain or stiffness  RESPIRATORY: No cough, wheezing, hemoptysis; No shortness of breath  CARDIOVASCULAR: No chest pain or palpitations  GASTROINTESTINAL: No abdominal or epigastric pain. No nausea, vomiting, or hematemesis; No diarrhea or constipation. No melena or hematochezia.  GENITOURINARY: No dysuria, frequency or hematuria  NEUROLOGICAL: No numbness or weakness  SKIN: No itching, burning, rashes, or lesions   All other review of systems is negative unless indicated above.    MEDICATIONS:  MEDICATIONS  (STANDING):  amLODIPine   Tablet 10 milliGRAM(s) Oral daily  docusate sodium 100 milliGRAM(s) Oral two times a day  enoxaparin Injectable 40 milliGRAM(s) SubCutaneous daily  hydrALAZINE 50 milliGRAM(s) Oral every 12 hours  losartan 100 milliGRAM(s) Oral daily  polyethylene glycol 3350 17 Gram(s) Oral two times a day      PHYSICAL EXAM:  T(C): 37 (03-20-19 @ 08:18), Max: 37.3 (03-19-19 @ 22:00)  HR: 80 (03-20-19 @ 08:00) (74 - 98)  BP: 119/71 (03-20-19 @ 08:00) (111/75 - 157/101)  RR: 18 (03-20-19 @ 08:00) (10 - 18)  SpO2: 96% (03-20-19 @ 08:00) (85% - 100%)  Wt(kg): --  I&O's Summary        Appearance: Normal	  HEENT:   Normal oral mucosa, PERRL, EOMI	  Lymphatic: No lymphadenopathy , no edema  Cardiovascular: Normal S1 S2, No JVD, No murmurs , Peripheral pulses palpable 2+ bilaterally  Respiratory: Lungs clear to auscultation, normal effort 	  Gastrointestinal:  Soft, Non-tender, + BS	  Skin: No rashes, No ecchymoses, No cyanosis, warm to touch  Musculoskeletal: Normal range of motion, normal strength  Psychiatry:  Mood & affect appropriate  Ext: No edema      LABS:    CARDIAC MARKERS:                                15.8   12.46 )-----------( 255      ( 20 Mar 2019 08:40 )             47.5           proBNP:   Lipid Profile:   HgA1c:   TSH:             TELEMETRY: 	  SR/ST   ECG:  	  RADIOLOGY:  < from: NM SPECT Brain Scan (03.19.19 @ 13:52) >    EXAM:  NM BRAIN IMG SPECT                                PROCEDURE DATE:  03/19/2019          INTERPRETATION:  Ceretec Brain SPECT with Diamox    RADIOPHARMACEUTICAL: 22.0 Tc-99m HMPAO (Ceretec), IV    HISTORY:  The patient is a 44 year old intracerebral hemorrhage.    PROCEDURE: The patient was pretreated with 1 g acetazolamide (Diamox)   intravenously followed in approximately 20 minutes with intravenous   administration of the radiotracer. Approximately 90 minutes following   radiotracer administration, SPECT/CT of the brain was performed.     Brain CT from 3/16/2019 and 3/17/2019 and brain MRI from 3/18/2019 were   repeated.    FINDINGS: There is decreased uptake in the region of the right caudate   nucleus and right putamen relative tothe left; there is no corresponding   abnormality on CT.    There is also mildly decreased uptake corresponding to left   intraparenchymal hemorrhage along the left lateral margin of the left   internal capsule as seen on the comparison CT.    IMPRESSION: The Tc-99m Ceretec SPECT/CT with Diamox demonstrates:    Decreased uptake in the right caudate nucleus and right putamen with no   corresponding abnormality on CT.    Mildly decreased uptake corresponding to left intraparenchymal hemorrhage   seen on CT from 3/16/2019 and 3/17/2019.                    DENISHA RAGLAND M.D., NUCLEAR MEDICINE ATTENDING  This document has been electronically signed. Mar 19 2019  4:51PM                < end of copied text >    DIAGNOSTIC TESTING:  [ ] Echocardiogram:  [ ]  Catheterization:  [ ] Stress Test:    OTHER:

## 2019-03-20 NOTE — PROGRESS NOTE ADULT - SUBJECTIVE AND OBJECTIVE BOX
THE PATIENT WAS SEEN AND EXAMINED BY ME WITH THE HOUSESTAFF AND STROKE TEAM DURING MORNING ROUNDS.   HPI:  45yo RH  male PMH HTN tx from OSH, p/w HA. LKW 3/11 2230. Patient developed sudden onset HA(felt like baseball bat to the head) at 2230 on 3/11; 15 mins later he developed intractable N/V. Went to Urgent Care 3/12, sent home on tamiflu. HA/nausea/vomiting persisted, so patient went to [facility] ED where CT/CTA done showing IPH in L ventral parietal lobe rupturing into subarachnoid space, CTA head w/ findings concerning for Flores Flores. Transferred to [facility] for further workup. On initial evaluation at [facility], patient HA improved although with photophobia, no focal neurologic deficits, NIHSS 0.     SUBJECTIVE: No events overnight.  No new neurologic complaints.      acetaminophen   Tablet .. 650 milliGRAM(s) Oral every 6 hours PRN  amLODIPine   Tablet 10 milliGRAM(s) Oral daily  docusate sodium 100 milliGRAM(s) Oral two times a day  enoxaparin Injectable 40 milliGRAM(s) SubCutaneous daily  hydrALAZINE 50 milliGRAM(s) Oral every 12 hours  hydrALAZINE Injectable 5 milliGRAM(s) IV Push every 6 hours PRN  losartan 100 milliGRAM(s) Oral daily  ondansetron Injectable 4 milliGRAM(s) IV Push every 6 hours PRN  oxyCODONE    5 mG/acetaminophen 325 mG 1 Tablet(s) Oral every 6 hours PRN  polyethylene glycol 3350 17 Gram(s) Oral two times a day  polyethylene glycol 3350 17 Gram(s) Oral once PRN      PHYSICAL EXAM:   Vital Signs Last 24 Hrs  T(C): 37.3 (19 Mar 2019 22:00), Max: 37.3 (19 Mar 2019 22:00)  T(F): 99.1 (19 Mar 2019 22:00), Max: 99.1 (19 Mar 2019 22:00)  HR: 75 (20 Mar 2019 06:00) (74 - 98)  BP: 124/91 (20 Mar 2019 06:00) (111/75 - 157/101)  BP(mean): 100 (20 Mar 2019 06:00) (88 - 115)  RR: 10 (20 Mar 2019 06:00) (10 - 19)  SpO2: 96% (20 Mar 2019 06:00) (85% - 100%)    General: No acute distress  HEENT: EOM intact, visual fields full  Abdomen: Soft, nontender, nondistended   Extremities: No edema, right groin site with no active bleeding ,no hematoma, soft, 2+ pedal pulses, temp and color symmetric b/l    NEUROLOGICAL EXAM:  Mental status: Awake, alert, oriented x3, no aphasia, no neglect, normal memory   Cranial Nerves: No facial asymmetry, no nystagmus, no dysarthria,  tongue midline  Motor exam: Normal tone, no drift, 5/5 RUE, 5/5 RLE, 5/5 LUE, 5/5 LLE, normal fine finger movements.  Sensation: Intact to light touch   Coordination/ Gait: No dysmetria, SEHLLIE intact and symmetric bilaterally    LABS:         Hemoglobin A1C, Whole Blood: 4.6 % (03-17 @ 08:50)      IMAGING: Reviewed by me.   CT HEAD (03/16): In comparison with prior report from 3/16/2019 at 5:00 AM redemonstration   of a left 1.1 x 1.1 cm intraparenchymal hemorrhage at the left posterior    limb of the left internal capsule, putamen and left external capsule,   edema and subarachnoid hemorrhage, no new midline shift. Basal cisterns   remain patent.    CT HEAD (03/16): Acute intraparenchymal hemorrhage in the ventral left parietal lobe   measuring 1.9 x 1 x 1 cm appears to have ruptured into the subarachnoid   space.  There is mild subarachnoid hemorrhage in the left sylvian fissure   and trace subarachnoid hemorrhage in the left frontal and parietal   regions.    CT Head No Cont (03.17.19)   Unchanged left intraparenchymal hemorrhage involving the posterior limb   of the left internal capsule, left external capsule and putamen with   surrounding edema and small associated subarachnoid hemorrhage. No   midline shift.  No new areas of hemorrhage. No hydrocephalus.    MRI/ MR Angio Neck Head No Cont (03.18.19)   1. Evidence of the left supraclinoid carotid occlusion is noted with the   presence of a hemorrhage in the distal left internal capsule region in   the posterior subinsular region.  2. MRA of the [place] demonstrates a left supraclinoid   occlusion. The left A2 is fed from the right. Evidence of   collateralization with increased flow in the left A2 and left PCA as   indicated on the  NOVA study. No left M1 is visualized. The more distal   MCA on the left likely fills from collateralization via the hypertrophied    lenticulostriates visualized on patient's catheter angiogram 3/18/2019   as well as the leptomeningeal collaterals. Nova report is included for   review.    NM SPECT Brain Scan (03.19.19)   The Tc-99m Ceretec SPECT/CT with Diamox demonstrates:  Decreased uptake in the right caudate nucleus and right putamen with no   corresponding abnormality on CT.  Mildly decreased uptake corresponding to left intraparenchymal hemorrhage   seen on CT from 3/16/2019 and 3/17/2019. THE PATIENT WAS SEEN AND EXAMINED BY ME WITH THE HOUSESTAFF AND STROKE TEAM DURING MORNING ROUNDS.   HPI:  43yo RH  male PMH HTN tx from OSH, p/w HA. LKW 3/11 2230. Patient developed sudden onset HA(felt like baseball bat to the head) at 2230 on 3/11; 15 mins later he developed intractable N/V. Went to Urgent Care 3/12, sent home on tamiflu. HA/nausea/vomiting persisted, so patient went to [facility] ED where CT/CTA done showing IPH in L ventral parietal lobe rupturing into subarachnoid space, CTA head w/ findings concerning for Flores Flores. Transferred to [facility] for further workup. On initial evaluation at [facility], patient HA improved although with photophobia, no focal neurologic deficits, NIHSS 0.     SUBJECTIVE: No events overnight.  No new neurologic complaints.      acetaminophen   Tablet .. 650 milliGRAM(s) Oral every 6 hours PRN  amLODIPine   Tablet 10 milliGRAM(s) Oral daily  docusate sodium 100 milliGRAM(s) Oral two times a day  enoxaparin Injectable 40 milliGRAM(s) SubCutaneous daily  hydrALAZINE 50 milliGRAM(s) Oral every 12 hours  hydrALAZINE Injectable 5 milliGRAM(s) IV Push every 6 hours PRN  losartan 100 milliGRAM(s) Oral daily  ondansetron Injectable 4 milliGRAM(s) IV Push every 6 hours PRN  oxyCODONE    5 mG/acetaminophen 325 mG 1 Tablet(s) Oral every 6 hours PRN  polyethylene glycol 3350 17 Gram(s) Oral two times a day  polyethylene glycol 3350 17 Gram(s) Oral once PRN      PHYSICAL EXAM:   Vital Signs Last 24 Hrs  T(C): 37.3 (19 Mar 2019 22:00), Max: 37.3 (19 Mar 2019 22:00)  T(F): 99.1 (19 Mar 2019 22:00), Max: 99.1 (19 Mar 2019 22:00)  HR: 75 (20 Mar 2019 06:00) (74 - 98)  BP: 124/91 (20 Mar 2019 06:00) (111/75 - 157/101)  BP(mean): 100 (20 Mar 2019 06:00) (88 - 115)  RR: 10 (20 Mar 2019 06:00) (10 - 19)  SpO2: 96% (20 Mar 2019 06:00) (85% - 100%)    General: No acute distress  HEENT: EOM intact, visual fields full  Abdomen: Soft, nontender, nondistended   Extremities: No edema    NEUROLOGICAL EXAM:  Mental status: Awake, alert, oriented x3, no aphasia, no neglect, normal memory   Cranial Nerves: No facial asymmetry, no nystagmus, no dysarthria,  tongue midline  Motor exam: Normal tone, no drift, 5/5 RUE, 5/5 RLE, 5/5 LUE, 5/5 LLE, normal fine finger movements.  Sensation: Intact to light touch   Coordination/ Gait: No dysmetria, SHELLIE intact and symmetric bilaterally    LABS:    Hemoglobin A1C, Whole Blood: 4.6 % (03-17 @ 08:50)      IMAGING: Reviewed by me.   CT HEAD (03/16): In comparison with prior report from 3/16/2019 at 5:00 AM redemonstration   of a left 1.1 x 1.1 cm intraparenchymal hemorrhage at the left posterior    limb of the left internal capsule, putamen and left external capsule,   edema and subarachnoid hemorrhage, no new midline shift. Basal cisterns   remain patent.    CT HEAD (03/16): Acute intraparenchymal hemorrhage in the ventral left parietal lobe   measuring 1.9 x 1 x 1 cm appears to have ruptured into the subarachnoid   space.  There is mild subarachnoid hemorrhage in the left sylvian fissure   and trace subarachnoid hemorrhage in the left frontal and parietal   regions.    CT Head No Cont (03.17.19)   Unchanged left intraparenchymal hemorrhage involving the posterior limb   of the left internal capsule, left external capsule and putamen with   surrounding edema and small associated subarachnoid hemorrhage. No   midline shift.  No new areas of hemorrhage. No hydrocephalus.    MRI/ MR Angio Neck Head No Cont (03.18.19)   1. Evidence of the left supraclinoid carotid occlusion is noted with the   presence of a hemorrhage in the distal left internal capsule region in   the posterior subinsular region.  2. MRA of the [place] of [place] demonstrates a left supraclinoid   occlusion. The left A2 is fed from the right. Evidence of   collateralization with increased flow in the left A2 and left PCA as   indicated on the  NOVA study. No left M1 is visualized. The more distal   MCA on the left likely fills from collateralization via the hypertrophied    lenticulostriates visualized on patient's catheter angiogram 3/18/2019   as well as the leptomeningeal collaterals. Nova report is included for   review.    NM SPECT Brain Scan (03.19.19)   The Tc-99m Ceretec SPECT/CT with Diamox demonstrates:  Decreased uptake in the right caudate nucleus and right putamen with no   corresponding abnormality on CT.  Mildly decreased uptake corresponding to left intraparenchymal hemorrhage   seen on CT from 3/16/2019 and 3/17/2019. THE PATIENT WAS SEEN AND EXAMINED BY ME WITH THE HOUSESTAFF AND STROKE TEAM DURING MORNING ROUNDS.     HPI:  45yo RH  male PMH HTN tx from OSH, p/w HA. LKW 3/11 2230. Patient developed sudden onset HA(felt like baseball bat to the head) at 2230 on 3/11; 15 mins later he developed intractable N/V. Went to Urgent Care 3/12, sent home on tamiflu. HA/nausea/vomiting persisted, so patient went to [facility] ED where CT/CTA done showing IPH in L ventral parietal lobe rupturing into subarachnoid space, CTA head w/ findings concerning for Flores Flores. Transferred to [facility] for further workup. On initial evaluation at [facility], patient HA improved although with photophobia, no focal neurologic deficits, NIHSS 0.     SUBJECTIVE: No events overnight.  No new neurologic complaints.      ROS: All negative except documented above.    acetaminophen   Tablet .. 650 milliGRAM(s) Oral every 6 hours PRN  amLODIPine   Tablet 10 milliGRAM(s) Oral daily  docusate sodium 100 milliGRAM(s) Oral two times a day  enoxaparin Injectable 40 milliGRAM(s) SubCutaneous daily  hydrALAZINE 50 milliGRAM(s) Oral every 12 hours  hydrALAZINE Injectable 5 milliGRAM(s) IV Push every 6 hours PRN  losartan 100 milliGRAM(s) Oral daily  ondansetron Injectable 4 milliGRAM(s) IV Push every 6 hours PRN  oxyCODONE    5 mG/acetaminophen 325 mG 1 Tablet(s) Oral every 6 hours PRN  polyethylene glycol 3350 17 Gram(s) Oral two times a day  polyethylene glycol 3350 17 Gram(s) Oral once PRN      PHYSICAL EXAM:   Vital Signs Last 24 Hrs  T(C): 37.3 (19 Mar 2019 22:00), Max: 37.3 (19 Mar 2019 22:00)  T(F): 99.1 (19 Mar 2019 22:00), Max: 99.1 (19 Mar 2019 22:00)  HR: 75 (20 Mar 2019 06:00) (74 - 98)  BP: 124/91 (20 Mar 2019 06:00) (111/75 - 157/101)  BP(mean): 100 (20 Mar 2019 06:00) (88 - 115)  RR: 10 (20 Mar 2019 06:00) (10 - 19)  SpO2: 96% (20 Mar 2019 06:00) (85% - 100%)    General: No acute distress  HEENT: EOM intact, visual fields full  Abdomen: Soft, nontender, nondistended   Extremities: No edema    NEUROLOGICAL EXAM:  Mental status: Awake, alert, oriented x3, no aphasia, no neglect, normal memory   Cranial Nerves: No facial asymmetry, no nystagmus, no dysarthria,  tongue midline  Motor exam: Normal tone, no drift, 5/5 RUE, 5/5 RLE, 5/5 LUE, 5/5 LLE, normal fine finger movements.  Sensation: Intact to light touch   Coordination/ Gait: No dysmetria, SHELLIE intact and symmetric bilaterally    LABS:    Hemoglobin A1C, Whole Blood: 4.6 % (03-17 @ 08:50)      IMAGING: Reviewed by me.   CT HEAD (03/16): In comparison with prior report from 3/16/2019 at 5:00 AM redemonstration   of a left 1.1 x 1.1 cm intraparenchymal hemorrhage at the left posterior    limb of the left internal capsule, putamen and left external capsule,   edema and subarachnoid hemorrhage, no new midline shift. Basal cisterns   remain patent.    CT HEAD (03/16): Acute intraparenchymal hemorrhage in the ventral left parietal lobe   measuring 1.9 x 1 x 1 cm appears to have ruptured into the subarachnoid   space.  There is mild subarachnoid hemorrhage in the left sylvian fissure   and trace subarachnoid hemorrhage in the left frontal and parietal   regions.    CT Head No Cont (03.17.19)   Unchanged left intraparenchymal hemorrhage involving the posterior limb   of the left internal capsule, left external capsule and putamen with   surrounding edema and small associated subarachnoid hemorrhage. No   midline shift.  No new areas of hemorrhage. No hydrocephalus.    MRI/ MR Angio Neck Head No Cont (03.18.19)   1. Evidence of the left supraclinoid carotid occlusion is noted with the   presence of a hemorrhage in the distal left internal capsule region in   the posterior subinsular region.  2. MRA of the [place] demonstrates a left supraclinoid   occlusion. The left A2 is fed from the right. Evidence of   collateralization with increased flow in the left A2 and left PCA as   indicated on the  NOVA study. No left M1 is visualized. The more distal   MCA on the left likely fills from collateralization via the hypertrophied    lenticulostriates visualized on patient's catheter angiogram 3/18/2019   as well as the leptomeningeal collaterals. Nova report is included for   review.    NM SPECT Brain Scan (03.19.19)   The Tc-99m Ceretec SPECT/CT with Diamox demonstrates:  Decreased uptake in the right caudate nucleus and right putamen with no   corresponding abnormality on CT.  Mildly decreased uptake corresponding to left intraparenchymal hemorrhage   seen on CT from 3/16/2019 and 3/17/2019.

## 2019-03-20 NOTE — PROGRESS NOTE ADULT - ASSESSMENT
44 years old man with vascular risk factor of hypertension initially presented to urgent care center for evaluation of thunderclap headache on 3/12.  He was diagnosed to have "flu" and was discharged on Tamiflu. He presented to [facility] ED due to persistence of headache and was subsequently transferred to [facility] for further management. CT brain or admission showed left sylvian fissure subarachnoid hemorrhage and acute small ICH in the left posterior putamen/lentiform nuclei. CTA head to my eye shows large vessel intracranial vasculopathy involving left supraclinoid ICA and proximal MCA M1 segment (leading to critical stenosis versus occlusion) and was concerning for dilated lenticulostriate branches. TTE was grossly unremarkable and did not show any evidence of LVH. 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    Impression:  Left sylvian fissure SAH and posterior putaminal/lentiform nuclei ICH - likely etiology being rupture of dilated moyamoya type collateral blood vessels in the setting of large vessel intracranial vasculopathy/steno-occlusive disease - i.e. left moyamoya syndrome    NEURO: neurologically without acute change, sx control for headaches and pain,  educated family and patient to return to ED if sx recur or any change in status, neurosurgery consulted for Dang Dang syndrome - s/p cerebral angiogram with findings suggestive of dang dang syndrome, no indications for statin therapy considering low 10-year ASCVD risk (1.7%) and non-atherosclerotic etiology of his large vessel intracranial vasculopathy, MR imaging as noted above, NM SPECT as noted above, w/out Diamox pending, anticipate STA-MCA Bypass for primary ischemic and secondary ICH stroke prevention anticipated in the next 2-4 weeks after repeat imaging, Physical therapy/OT eval for home with outpatient services.    ANTITHROMBOTIC THERAPY: None in the setting of ICH and no specific indications at this time     PULMONARY:  protecting airway, saturating well, ANITA workup as outpatient     CARDIOVASCULAR:  TTE: ef 68%, borderline pulmonary htn, cardiac monitoring without acute events noted                           SBP goal: very gradual normotension in the setting of large vessel intracranial vasculopathy    GASTROINTESTINAL:  dysphagia screen - passed, tolerating Regular diet       Diet: Regular    RENAL: BUN/Cr without acute change, good urine output, maintain adequate hydration      Na Goal: Greater than 135     Dorsey: N    HEMATOLOGY: H/H without acute change, no active signs of bleeding, Hypercoag panel pending especially antiphospholipid antibodies. Hemoglobin electrophoresis to rule out possibility of sickle cell disease as it has been associated with moyamoya syndrome      DVT ppx: lovenox     ID: afebrile, leukocytosis - no overt si/sx of infection, will continue to monitor- PCP follow up, RVP negative     OTHER: Plan of care discussed with patient and wife at bedside. All the questions were answered and concerns were addressed.     DISPOSITION: Home with outpatient services     CORE MEASURES:        Admission NIHSS: 0     TPA: [] YES [x] NO      LDL/HDL: 85/38     Depression Screen: 0, upset about current condition- support provided- deferred psych, no sihi, continue to monitor.      Statin Therapy: N     Dysphagia Screen: [x] PASS [] FAIL     Smoking [] YES [x] NO      Afib [] YES [x] NO     Stroke Education [x] YES [] NO 44 years old man with vascular risk factor of hypertension initially presented to urgent care center for evaluation of thunderclap headache on 3/12.  He was diagnosed to have "flu" and was discharged on Tamiflu. He presented to [facility] ED due to persistence of headache and was subsequently transferred to [facility] for further management. CT brain or admission showed left sylvian fissure subarachnoid hemorrhage and acute small ICH in the left posterior putamen/lentiform nuclei. CTA head to my eye shows large vessel intracranial vasculopathy involving left supraclinoid ICA and proximal MCA M1 segment (leading to critical stenosis versus occlusion) and was concerning for dilated lenticulostriate branches. TTE was grossly unremarkable and did not show any evidence of LVH. 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    Impression:  Left sylvian fissure SAH and posterior putaminal/lentiform nuclei ICH - likely etiology being rupture of dilated moyamoya type collateral blood vessels in the setting of large vessel intracranial vasculopathy/steno-occlusive disease - i.e. left moyamoya syndrome    NEURO: neurologically without acute change, sx control for headaches and pain- appears improved,  educated family and patient to return to ED if sx recur or any change in status, neurosurgery consulted for Dang Dang syndrome - s/p cerebral angiogram with findings suggestive of dang dang syndrome, no indications for statin therapy considering low 10-year ASCVD risk (1.7%) and non-atherosclerotic etiology of his large vessel intracranial vasculopathy, MR imaging as noted above, NM SPECT as noted above, w/out Diamox pending, anticipate STA-MCA Bypass for primary ischemic and secondary ICH stroke prevention anticipated in the next 2-4 weeks after repeat imaging, Physical therapy/OT eval for home with outpatient services.    ANTITHROMBOTIC THERAPY: None in the setting of ICH and no specific indications at this time     PULMONARY:  protecting airway, saturating well, ANITA workup as outpatient     CARDIOVASCULAR:  TTE: ef 68%, borderline pulmonary htn, cardiac monitoring with tachycardia, improved s/p IVF bolus- encouraged PO hydration                        SBP goal: very gradual normotension in the setting of large vessel intracranial vasculopathy    GASTROINTESTINAL:  dysphagia screen - passed, tolerating Regular diet       Diet: Regular    RENAL: BUN/Cr without acute change, good urine output, maintain adequate hydration      Na Goal: Greater than 135     Dorsey: N    HEMATOLOGY: H/H without acute change, no active signs of bleeding, Hypercoag panel pending especially antiphospholipid antibodies. Hemoglobin electrophoresis to rule out possibility of sickle cell disease as it has been associated with moyamoya syndrome      DVT ppx: lovenox     ID: afebrile, leukocytosis - no overt si/sx of infection, will continue to monitor, slight downtrend- PCP follow up, RVP negative     OTHER: Plan of care discussed with patient and wife at bedside. All the questions were answered and concerns were addressed.     DISPOSITION: Home with outpatient services     CORE MEASURES:        Admission NIHSS: 0     TPA: [] YES [x] NO      LDL/HDL: 85/38     Depression Screen: 0, esd upset about current condition- support provided, feels improved- deferred psych, no sihi, continue to monitor.      Statin Therapy: N     Dysphagia Screen: [x] PASS [] FAIL     Smoking [] YES [x] NO      Afib [] YES [x] NO     Stroke Education [x] YES [] NO 44 years old man with vascular risk factor of hypertension initially presented to urgent care center for evaluation of thunderclap headache on 3/12.  He was diagnosed to have "flu" and was discharged on Tamiflu. He presented to [facility] ED due to persistence of headache and was subsequently transferred to [facility] for further management. CT brain or admission showed left sylvian fissure subarachnoid hemorrhage and acute small ICH in the left posterior putamen/lentiform nuclei. CTA head to my eye shows large vessel intracranial vasculopathy involving left supraclinoid ICA and proximal MCA M1 segment (leading to critical stenosis versus occlusion) and was concerning for dilated lenticulostriate branches. TTE was grossly unremarkable and did not show any evidence of LVH. 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     Impression:  Left sylvian fissure SAH and posterior putaminal/lentiform nuclei ICH - likely etiology being rupture of dilated moyamoya type collateral blood vessels in the setting of large vessel intracranial vasculopathy/steno-occlusive disease - i.e. left moyamoya syndrome    NEURO: neurologically without acute change, sx control for headaches and pain - appears improved,  educated family and patient to return to ED if sx recur or any change in status, neurosurgery consulted for Dang Dang syndrome - s/p cerebral angiogram with findings suggestive of dang dang syndrome, no indications for statin therapy considering low 10-year ASCVD risk (1.7%) and non-atherosclerotic etiology of his large vessel intracranial vasculopathy, MR imaging as noted above, NM SPECT as noted above, w/out Diamox pending, anticipate STA-MCA Bypass for primary ischemic and secondary ICH stroke prevention anticipated in the next 2-4 weeks after repeat imaging, Physical therapy/OT eval for home with outpatient services.    ANTITHROMBOTIC THERAPY: None in the setting of ICH and no specific indications at this time     PULMONARY:  protecting airway, saturating well, ANITA workup as outpatient     CARDIOVASCULAR:  TTE: ef 68%, borderline pulmonary htn, cardiac monitoring with tachycardia, improved s/p IVF bolus - encouraged PO hydration                        SBP goal: very gradual normotension in the setting of large vessel intracranial vasculopathy    GASTROINTESTINAL:  dysphagia screen - passed, tolerating Regular diet       Diet: Regular    RENAL: BUN/Cr without acute change, good urine output, maintain adequate hydration      Na Goal: Greater than 135     Dorsey: N    HEMATOLOGY: H/H without acute change, no active signs of bleeding, Hypercoag panel pending especially antiphospholipid antibodies. Hemoglobin electrophoresis to rule out possibility of sickle cell disease as it has been associated with moyamoya syndrome      DVT ppx: lovenox     ID: afebrile, leukocytosis - no overt si/sx of infection, will continue to monitor, slight downtrend- PCP follow up, RVP negative     OTHER: Plan of care discussed with patient and wife at bedside. All the questions were answered and concerns were addressed.     DISPOSITION: Home with outpatient services, anticipate discharge upon completion of pre/post Diamox SPECT scan    CORE MEASURES:        Admission NIHSS: 0     TPA: [] YES [x] NO      LDL/HDL: 85/38     Depression Screen: 0, esd upset about current condition- support provided, feels improved- deferred psych, no sihi, continue to monitor.      Statin Therapy: N     Dysphagia Screen: [x] PASS [] FAIL     Smoking [] YES [x] NO      Afib [] YES [x] NO     Stroke Education [x] YES [] NO

## 2019-03-20 NOTE — PROGRESS NOTE ADULT - ASSESSMENT
45 yo male admitted to the Stroke unit with intracranial hemorrhage in setting of hypertensive emergency   Cont  Hydralazine 50 mg BID and Cont  norvasc 10 mg daily , Losartan 100 mg   Nsx or Neurology follow up. s/p angiogram. Flores Flores   / bypass surgery. Nsx follow up  Outpatient sleep study

## 2019-03-21 VITALS — TEMPERATURE: 98 F

## 2019-03-21 PROBLEM — Z00.00 ENCOUNTER FOR PREVENTIVE HEALTH EXAMINATION: Status: ACTIVE | Noted: 2019-03-21

## 2019-03-21 LAB
ANION GAP SERPL CALC-SCNC: 15 MMOL/L — SIGNIFICANT CHANGE UP (ref 5–17)
B2 GLYCOPROT1 AB SER QL: NEGATIVE — SIGNIFICANT CHANGE UP
BUN SERPL-MCNC: 30 MG/DL — HIGH (ref 7–23)
CALCIUM SERPL-MCNC: 9 MG/DL — SIGNIFICANT CHANGE UP (ref 8.4–10.5)
CARDIOLIPIN AB SER-ACNC: NEGATIVE — SIGNIFICANT CHANGE UP
CHLORIDE SERPL-SCNC: 106 MMOL/L — SIGNIFICANT CHANGE UP (ref 96–108)
CO2 SERPL-SCNC: 15 MMOL/L — LOW (ref 22–31)
CREAT SERPL-MCNC: 1.17 MG/DL — SIGNIFICANT CHANGE UP (ref 0.5–1.3)
GLUCOSE SERPL-MCNC: 104 MG/DL — HIGH (ref 70–99)
HCT VFR BLD CALC: 47.2 % — SIGNIFICANT CHANGE UP (ref 39–50)
HEMOGLOBIN INTERPRETATION: SIGNIFICANT CHANGE UP
HGB A MFR BLD: 97.3 % — SIGNIFICANT CHANGE UP (ref 95.8–98)
HGB A2 MFR BLD: 2.7 % — SIGNIFICANT CHANGE UP (ref 2–3.2)
HGB BLD-MCNC: 15.9 G/DL — SIGNIFICANT CHANGE UP (ref 13–17)
MCHC RBC-ENTMCNC: 30.9 PG — SIGNIFICANT CHANGE UP (ref 27–34)
MCHC RBC-ENTMCNC: 33.7 GM/DL — SIGNIFICANT CHANGE UP (ref 32–36)
MCV RBC AUTO: 91.8 FL — SIGNIFICANT CHANGE UP (ref 80–100)
PLATELET # BLD AUTO: 280 K/UL — SIGNIFICANT CHANGE UP (ref 150–400)
POTASSIUM SERPL-MCNC: 3.5 MMOL/L — SIGNIFICANT CHANGE UP (ref 3.5–5.3)
POTASSIUM SERPL-SCNC: 3.5 MMOL/L — SIGNIFICANT CHANGE UP (ref 3.5–5.3)
RBC # BLD: 5.14 M/UL — SIGNIFICANT CHANGE UP (ref 4.2–5.8)
RBC # FLD: 13 % — SIGNIFICANT CHANGE UP (ref 10.3–14.5)
SODIUM SERPL-SCNC: 136 MMOL/L — SIGNIFICANT CHANGE UP (ref 135–145)
WBC # BLD: 11.13 K/UL — HIGH (ref 3.8–10.5)
WBC # FLD AUTO: 11.13 K/UL — HIGH (ref 3.8–10.5)

## 2019-03-21 PROCEDURE — 86901 BLOOD TYPING SEROLOGIC RH(D): CPT

## 2019-03-21 PROCEDURE — 83036 HEMOGLOBIN GLYCOSYLATED A1C: CPT

## 2019-03-21 PROCEDURE — 83020 HEMOGLOBIN ELECTROPHORESIS: CPT

## 2019-03-21 PROCEDURE — 93005 ELECTROCARDIOGRAM TRACING: CPT

## 2019-03-21 PROCEDURE — 86850 RBC ANTIBODY SCREEN: CPT

## 2019-03-21 PROCEDURE — 86036 ANCA SCREEN EACH ANTIBODY: CPT

## 2019-03-21 PROCEDURE — 71275 CT ANGIOGRAPHY CHEST: CPT

## 2019-03-21 PROCEDURE — 81240 F2 GENE: CPT

## 2019-03-21 PROCEDURE — A9585: CPT

## 2019-03-21 PROCEDURE — 86900 BLOOD TYPING SEROLOGIC ABO: CPT

## 2019-03-21 PROCEDURE — A9521: CPT

## 2019-03-21 PROCEDURE — 70549 MR ANGIOGRAPH NECK W/O&W/DYE: CPT

## 2019-03-21 PROCEDURE — 36224 PLACE CATH CAROTD ART: CPT

## 2019-03-21 PROCEDURE — 87633 RESP VIRUS 12-25 TARGETS: CPT

## 2019-03-21 PROCEDURE — 96375 TX/PRO/DX INJ NEW DRUG ADDON: CPT | Mod: XU

## 2019-03-21 PROCEDURE — 97110 THERAPEUTIC EXERCISES: CPT

## 2019-03-21 PROCEDURE — 86146 BETA-2 GLYCOPROTEIN ANTIBODY: CPT

## 2019-03-21 PROCEDURE — 85613 RUSSELL VIPER VENOM DILUTED: CPT

## 2019-03-21 PROCEDURE — 96374 THER/PROPH/DIAG INJ IV PUSH: CPT | Mod: XU

## 2019-03-21 PROCEDURE — 97116 GAIT TRAINING THERAPY: CPT

## 2019-03-21 PROCEDURE — 86038 ANTINUCLEAR ANTIBODIES: CPT

## 2019-03-21 PROCEDURE — 85610 PROTHROMBIN TIME: CPT

## 2019-03-21 PROCEDURE — 80048 BASIC METABOLIC PNL TOTAL CA: CPT

## 2019-03-21 PROCEDURE — 80307 DRUG TEST PRSMV CHEM ANLYZR: CPT

## 2019-03-21 PROCEDURE — C1894: CPT

## 2019-03-21 PROCEDURE — 70553 MRI BRAIN STEM W/O & W/DYE: CPT

## 2019-03-21 PROCEDURE — 85300 ANTITHROMBIN III ACTIVITY: CPT

## 2019-03-21 PROCEDURE — 86160 COMPLEMENT ANTIGEN: CPT

## 2019-03-21 PROCEDURE — 97165 OT EVAL LOW COMPLEX 30 MIN: CPT

## 2019-03-21 PROCEDURE — 78803 RP LOCLZJ TUM SPECT 1 AREA: CPT

## 2019-03-21 PROCEDURE — 80053 COMPREHEN METABOLIC PANEL: CPT

## 2019-03-21 PROCEDURE — 87798 DETECT AGENT NOS DNA AMP: CPT

## 2019-03-21 PROCEDURE — 78607: CPT | Mod: 26

## 2019-03-21 PROCEDURE — 85730 THROMBOPLASTIN TIME PARTIAL: CPT

## 2019-03-21 PROCEDURE — 70450 CT HEAD/BRAIN W/O DYE: CPT

## 2019-03-21 PROCEDURE — 85306 CLOT INHIBIT PROT S FREE: CPT

## 2019-03-21 PROCEDURE — 83090 ASSAY OF HOMOCYSTEINE: CPT

## 2019-03-21 PROCEDURE — C1887: CPT

## 2019-03-21 PROCEDURE — 70544 MR ANGIOGRAPHY HEAD W/O DYE: CPT

## 2019-03-21 PROCEDURE — 80061 LIPID PANEL: CPT

## 2019-03-21 PROCEDURE — 71275 CT ANGIOGRAPHY CHEST: CPT | Mod: 26

## 2019-03-21 PROCEDURE — 81241 F5 GENE: CPT

## 2019-03-21 PROCEDURE — 93306 TTE W/DOPPLER COMPLETE: CPT

## 2019-03-21 PROCEDURE — 86140 C-REACTIVE PROTEIN: CPT

## 2019-03-21 PROCEDURE — 85303 CLOT INHIBIT PROT C ACTIVITY: CPT

## 2019-03-21 PROCEDURE — 81291 MTHFR GENE: CPT

## 2019-03-21 PROCEDURE — 85307 ASSAY ACTIVATED PROTEIN C: CPT

## 2019-03-21 PROCEDURE — 86147 CARDIOLIPIN ANTIBODY EA IG: CPT

## 2019-03-21 PROCEDURE — 36227 PLACE CATH XTRNL CAROTID: CPT

## 2019-03-21 PROCEDURE — 87581 M.PNEUMON DNA AMP PROBE: CPT

## 2019-03-21 PROCEDURE — 36226 PLACE CATH VERTEBRAL ART: CPT

## 2019-03-21 PROCEDURE — 87486 CHLMYD PNEUM DNA AMP PROBE: CPT

## 2019-03-21 PROCEDURE — 85652 RBC SED RATE AUTOMATED: CPT

## 2019-03-21 PROCEDURE — 97162 PT EVAL MOD COMPLEX 30 MIN: CPT

## 2019-03-21 PROCEDURE — C1769: CPT

## 2019-03-21 PROCEDURE — 85027 COMPLETE CBC AUTOMATED: CPT

## 2019-03-21 PROCEDURE — 99233 SBSQ HOSP IP/OBS HIGH 50: CPT

## 2019-03-21 PROCEDURE — 99285 EMERGENCY DEPT VISIT HI MDM: CPT | Mod: 25

## 2019-03-21 RX ORDER — SODIUM CHLORIDE 9 MG/ML
500 INJECTION INTRAMUSCULAR; INTRAVENOUS; SUBCUTANEOUS ONCE
Qty: 0 | Refills: 0 | Status: DISCONTINUED | OUTPATIENT
Start: 2019-03-21 | End: 2019-03-21

## 2019-03-21 RX ORDER — DOCUSATE SODIUM 100 MG
1 CAPSULE ORAL
Qty: 60 | Refills: 3 | OUTPATIENT
Start: 2019-03-21 | End: 2019-07-18

## 2019-03-21 RX ORDER — AMLODIPINE BESYLATE 2.5 MG/1
1 TABLET ORAL
Qty: 30 | Refills: 3
Start: 2019-03-21 | End: 2019-07-18

## 2019-03-21 RX ORDER — LOSARTAN POTASSIUM 100 MG/1
1 TABLET, FILM COATED ORAL
Qty: 30 | Refills: 3
Start: 2019-03-21 | End: 2019-07-18

## 2019-03-21 RX ORDER — LOSARTAN POTASSIUM 100 MG/1
1 TABLET, FILM COATED ORAL
Qty: 0 | Refills: 0 | COMMUNITY

## 2019-03-21 RX ORDER — AMLODIPINE BESYLATE 2.5 MG/1
1 TABLET ORAL
Qty: 0 | Refills: 0 | COMMUNITY

## 2019-03-21 RX ADMIN — POLYETHYLENE GLYCOL 3350 17 GRAM(S): 17 POWDER, FOR SOLUTION ORAL at 05:08

## 2019-03-21 RX ADMIN — Medication 100 MILLIGRAM(S): at 05:08

## 2019-03-21 RX ADMIN — Medication 50 MILLIGRAM(S): at 06:13

## 2019-03-21 RX ADMIN — AMLODIPINE BESYLATE 10 MILLIGRAM(S): 2.5 TABLET ORAL at 05:08

## 2019-03-21 RX ADMIN — LOSARTAN POTASSIUM 100 MILLIGRAM(S): 100 TABLET, FILM COATED ORAL at 05:08

## 2019-03-21 NOTE — PROGRESS NOTE ADULT - NSHPATTENDINGPLANDISCUSS_GEN_ALL_CORE
Neurology residents, PA, NP and medical students on stroke service
Neurology resident, PAs, NP and medical students on stroke service.
Neurology residents, PAs and medical students on stroke service
Neurology residents, PAs, NP and medical students on stroke team

## 2019-03-21 NOTE — PROGRESS NOTE ADULT - SUBJECTIVE AND OBJECTIVE BOX
Subjective: Patient seen and examined. No new events except as noted.   orthostatic and has been tachycardic   denies chest pain or shortness of breath   remains in Stroke unit       REVIEW OF SYSTEMS:    CONSTITUTIONAL: + weakness, fevers or chills  EYES/ENT: No visual changes;  No vertigo or throat pain   NECK: No pain or stiffness  RESPIRATORY: No cough, wheezing, hemoptysis; No shortness of breath  CARDIOVASCULAR: No chest pain or palpitations  GASTROINTESTINAL: No abdominal or epigastric pain. No nausea, vomiting, or hematemesis; No diarrhea or constipation. No melena or hematochezia.  GENITOURINARY: No dysuria, frequency or hematuria  NEUROLOGICAL: No numbness or weakness  SKIN: No itching, burning, rashes, or lesions   All other review of systems is negative unless indicated above.    MEDICATIONS:  MEDICATIONS  (STANDING):  amLODIPine   Tablet 10 milliGRAM(s) Oral daily  docusate sodium 100 milliGRAM(s) Oral two times a day  enoxaparin Injectable 40 milliGRAM(s) SubCutaneous daily  hydrALAZINE 50 milliGRAM(s) Oral every 12 hours  losartan 100 milliGRAM(s) Oral daily  polyethylene glycol 3350 17 Gram(s) Oral two times a day  psyllium Powder 1 Packet(s) Oral daily  sodium chloride 0.9%. 1000 milliLiter(s) (125 mL/Hr) IV Continuous <Continuous>      PHYSICAL EXAM:  T(C): 36.6 (03-21-19 @ 08:51), Max: 36.8 (03-20-19 @ 15:52)  HR: 98 (03-21-19 @ 10:00) (69 - 118)  BP: 115/64 (03-21-19 @ 10:00) (86/54 - 136/85)  RR: 17 (03-21-19 @ 10:00) (15 - 26)  SpO2: 96% (03-21-19 @ 10:00) (95% - 98%)  Wt(kg): --  I&O's Summary    20 Mar 2019 07:01  -  21 Mar 2019 07:00  --------------------------------------------------------  IN: 480 mL / OUT: 0 mL / NET: 480 mL          Appearance: NAD	  HEENT:   Normal oral mucosa, PERRL, EOMI	  Lymphatic: No lymphadenopathy , no edema  Cardiovascular: Normal S1 S2, No JVD, No murmurs , Peripheral pulses palpable 2+ bilaterally  Respiratory: Lungs clear to auscultation, normal effort 	  Gastrointestinal:  Soft, Non-tender, + BS	  Skin: No rashes, No ecchymoses, No cyanosis, warm to touch  Musculoskeletal: Normal range of motion, normal strength  Psychiatry:  Mood & affect appropriate  Ext: No edema      LABS:    CARDIAC MARKERS:                                15.9   11.13 )-----------( 280      ( 21 Mar 2019 10:04 )             47.2     03-21    136  |  106  |  30<H>  ----------------------------<  104<H>  3.5   |  15<L>  |  1.17    Ca    9.0      21 Mar 2019 06:19      proBNP:   Lipid Profile:   HgA1c:   TSH:             TELEMETRY: 	  SR/ST   ECG:  	  RADIOLOGY:   DIAGNOSTIC TESTING:  [ ] Echocardiogram:  [ ]  Catheterization:  [ ] Stress Test:    OTHER:

## 2019-03-21 NOTE — PROGRESS NOTE ADULT - ASSESSMENT
45 yo male admitted to the Stroke unit with intracranial hemorrhage in setting of hypertensive emergency   DC Hydralazine 50 mg BID for now given orthostasis   Check CTA chest rule out PE  Cont with Losartan and norvasc  Nsx or Neurology follow up. s/p angiogram. Flores Flores   / bypass surgery. Nsx follow up  Outpatient sleep study

## 2019-03-21 NOTE — PROGRESS NOTE ADULT - SUBJECTIVE AND OBJECTIVE BOX
THE PATIENT WAS SEEN AND EXAMINED BY ME WITH THE HOUSESTAFF AND STROKE TEAM DURING MORNING ROUNDS.     HPI:  43yo RH  male PMH HTN tx from OSH, p/w HA. LKW 3/11 2230. Patient developed sudden onset HA(felt like baseball bat to the head) at 2230 on 3/11; 15 mins later he developed intractable N/V. Went to Urgent Care 3/12, sent home on tamiflu. HA/nausea/vomiting persisted, so patient went to [facility] ED where CT/CTA done showing IPH in L ventral parietal lobe rupturing into subarachnoid space, CTA head w/ findings concerning for Flores Flores. Transferred to [facility] for further workup. On initial evaluation at [facility], patient HA improved although with photophobia, no focal neurologic deficits, NIHSS 0.     SUBJECTIVE: No events overnight.  No new neurologic complaints.      ROS: All negative except documented above.    acetaminophen   Tablet .. 650 milliGRAM(s) Oral every 6 hours PRN  amLODIPine   Tablet 10 milliGRAM(s) Oral daily  docusate sodium 100 milliGRAM(s) Oral two times a day  enoxaparin Injectable 40 milliGRAM(s) SubCutaneous daily  hydrALAZINE 50 milliGRAM(s) Oral every 12 hours  hydrALAZINE Injectable 5 milliGRAM(s) IV Push every 6 hours PRN  losartan 100 milliGRAM(s) Oral daily  ondansetron Injectable 4 milliGRAM(s) IV Push every 6 hours PRN  polyethylene glycol 3350 17 Gram(s) Oral two times a day  polyethylene glycol 3350 17 Gram(s) Oral once PRN  psyllium Powder 1 Packet(s) Oral daily  sodium chloride 0.9%. 1000 milliLiter(s) IV Continuous <Continuous>    PHYSICAL EXAM:   Vital Signs Last 24 Hrs  T(C): 36.7 (20 Mar 2019 19:38), Max: 37 (20 Mar 2019 08:18)  T(F): 98 (20 Mar 2019 19:38), Max: 98.6 (20 Mar 2019 08:18)  HR: 90 (21 Mar 2019 06:00) (69 - 118)  BP: 136/85 (21 Mar 2019 06:00) (86/54 - 136/85)  BP(mean): 97 (21 Mar 2019 06:00) (70 - 97)  RR: 22 (21 Mar 2019 06:00) (15 - 26)  SpO2: 95% (21 Mar 2019 06:00) (95% - 98%)    General: No acute distress  HEENT: EOM intact, visual fields full  Abdomen: Soft, nontender, nondistended   Extremities: No edema    NEUROLOGICAL EXAM:  Mental status: Awake, alert, oriented x3, no neglect, normal memory, speech fluent, IDs objects, follows commands  Cranial Nerves: No facial asymmetry, no nystagmus, no dysarthria,  tongue midline  Motor exam: Normal tone, no drift, 5/5 RUE, 5/5 RLE, 5/5 LUE, 5/5 LLE  Sensation: Intact to light touch   Coordination/ Gait: No dysmetria, gait normal    LABS:                        15.8   12.46 )-----------( 255      ( 20 Mar 2019 08:40 )             47.5    03-21    136  |  106  |  30<H>  ----------------------------<  104<H>  3.5   |  15<L>  |  1.17    Ca    9.0      21 Mar 2019 06:19    Hemoglobin A1C, Whole Blood: 4.6 % (03-17 @ 08:50)    IMAGING: Reviewed by me.     CT HEAD (03/16): In comparison with prior report from 3/16/2019 at 5:00 AM redemonstration   of a left 1.1 x 1.1 cm intraparenchymal hemorrhage at the left posterior    limb of the left internal capsule, putamen and left external capsule,   edema and subarachnoid hemorrhage, no new midline shift. Basal cisterns   remain patent.    CT HEAD (03/16): Acute intraparenchymal hemorrhage in the ventral left parietal lobe   measuring 1.9 x 1 x 1 cm appears to have ruptured into the subarachnoid   space.  There is mild subarachnoid hemorrhage in the left sylvian fissure   and trace subarachnoid hemorrhage in the left frontal and parietal   regions.    CT Head No Cont (03.17.19)   Unchanged left intraparenchymal hemorrhage involving the posterior limb   of the left internal capsule, left external capsule and putamen with   surrounding edema and small associated subarachnoid hemorrhage. No   midline shift.  No new areas of hemorrhage. No hydrocephalus.    MRI/ MR Angio Neck Head No Cont (03.18.19)   1. Evidence of the left supraclinoid carotid occlusion is noted with the   presence of a hemorrhage in the distal left internal capsule region in   the posterior subinsular region.  2. MRA of the [place] demonstrates a left supraclinoid   occlusion. The left A2 is fed from the right. Evidence of   collateralization with increased flow in the left A2 and left PCA as   indicated on the  NOVA study. No left M1 is visualized. The more distal   MCA on the left likely fills from collateralization via the hypertrophied    lenticulostriates visualized on patient's catheter angiogram 3/18/2019   as well as the leptomeningeal collaterals. Nova report is included for   review.    NM SPECT Brain Scan (03.19.19)   The Tc-99m Ceretec SPECT/CT with Diamox demonstrates:  Decreased uptake in the right caudate nucleus and right putamen with no   corresponding abnormality on CT.  Mildly decreased uptake corresponding to left intraparenchymal hemorrhage   seen on CT from 3/16/2019 and 3/17/2019. THE PATIENT WAS SEEN AND EXAMINED BY ME WITH THE HOUSESTAFF AND STROKE TEAM DURING MORNING ROUNDS.     HPI:  45yo RH  male PMH HTN tx from OSH, p/w HA. LKW 3/11 2230. Patient developed sudden onset HA(felt like baseball bat to the head) at 2230 on 3/11; 15 mins later he developed intractable N/V. Went to Urgent Care 3/12, sent home on tamiflu. HA/nausea/vomiting persisted, so patient went to [facility] ED where CT/CTA done showing IPH in L ventral parietal lobe rupturing into subarachnoid space, CTA head w/ findings concerning for Flores Flores. Transferred to [facility] for further workup. On initial evaluation at [facility], patient HA improved although with photophobia, no focal neurologic deficits, NIHSS 0.     SUBJECTIVE: No events overnight.  No new neurologic complaints. Noted with tachycardia and found to be orthostatic positive.      ROS: All negative except documented above.    acetaminophen   Tablet .. 650 milliGRAM(s) Oral every 6 hours PRN  amLODIPine   Tablet 10 milliGRAM(s) Oral daily  docusate sodium 100 milliGRAM(s) Oral two times a day  enoxaparin Injectable 40 milliGRAM(s) SubCutaneous daily  hydrALAZINE 50 milliGRAM(s) Oral every 12 hours  hydrALAZINE Injectable 5 milliGRAM(s) IV Push every 6 hours PRN  losartan 100 milliGRAM(s) Oral daily  ondansetron Injectable 4 milliGRAM(s) IV Push every 6 hours PRN  polyethylene glycol 3350 17 Gram(s) Oral two times a day  polyethylene glycol 3350 17 Gram(s) Oral once PRN  psyllium Powder 1 Packet(s) Oral daily  sodium chloride 0.9%. 1000 milliLiter(s) IV Continuous <Continuous>    PHYSICAL EXAM:   Vital Signs Last 24 Hrs  T(C): 36.7 (20 Mar 2019 19:38), Max: 37 (20 Mar 2019 08:18)  T(F): 98 (20 Mar 2019 19:38), Max: 98.6 (20 Mar 2019 08:18)  HR: 90 (21 Mar 2019 06:00) (69 - 118)  BP: 136/85 (21 Mar 2019 06:00) (86/54 - 136/85)  BP(mean): 97 (21 Mar 2019 06:00) (70 - 97)  RR: 22 (21 Mar 2019 06:00) (15 - 26)  SpO2: 95% (21 Mar 2019 06:00) (95% - 98%)    General: No acute distress  HEENT: EOM intact, visual fields full  Abdomen: Soft, nontender, nondistended   Extremities: No edema    NEUROLOGICAL EXAM:  Mental status: Awake, alert, oriented x3, no neglect, normal memory, speech fluent, IDs objects, follows commands  Cranial Nerves: No facial asymmetry, no nystagmus, no dysarthria,  tongue midline  Motor exam: Normal tone, no drift, 5/5 RUE, 5/5 RLE, 5/5 LUE, 5/5 LLE  Sensation: Intact to light touch   Coordination/ Gait: No dysmetria, gait normal    LABS:                        15.8   12.46 )-----------( 255      ( 20 Mar 2019 08:40 )             47.5    03-21    136  |  106  |  30<H>  ----------------------------<  104<H>  3.5   |  15<L>  |  1.17    Ca    9.0      21 Mar 2019 06:19    Hemoglobin A1C, Whole Blood: 4.6 % (03-17 @ 08:50)    IMAGING: Reviewed by me.     CT HEAD (03/16): In comparison with prior report from 3/16/2019 at 5:00 AM redemonstration   of a left 1.1 x 1.1 cm intraparenchymal hemorrhage at the left posterior    limb of the left internal capsule, putamen and left external capsule,   edema and subarachnoid hemorrhage, no new midline shift. Basal cisterns   remain patent.    CT HEAD (03/16): Acute intraparenchymal hemorrhage in the ventral left parietal lobe   measuring 1.9 x 1 x 1 cm appears to have ruptured into the subarachnoid   space.  There is mild subarachnoid hemorrhage in the left sylvian fissure   and trace subarachnoid hemorrhage in the left frontal and parietal   regions.    CT Head No Cont (03.17.19)   Unchanged left intraparenchymal hemorrhage involving the posterior limb   of the left internal capsule, left external capsule and putamen with   surrounding edema and small associated subarachnoid hemorrhage. No   midline shift.  No new areas of hemorrhage. No hydrocephalus.    MRI/ MR Angio Neck Head No Cont (03.18.19)   1. Evidence of the left supraclinoid carotid occlusion is noted with the   presence of a hemorrhage in the distal left internal capsule region in   the posterior subinsular region.  2. MRA of the [place] demonstrates a left supraclinoid   occlusion. The left A2 is fed from the right. Evidence of   collateralization with increased flow in the left A2 and left PCA as   indicated on the  NOVA study. No left M1 is visualized. The more distal   MCA on the left likely fills from collateralization via the hypertrophied    lenticulostriates visualized on patient's catheter angiogram 3/18/2019   as well as the leptomeningeal collaterals. Nova report is included for   review.    NM SPECT Brain Scan (03.19.19)   The Tc-99m Ceretec SPECT/CT with Diamox demonstrates:  Decreased uptake in the right caudate nucleus and right putamen with no   corresponding abnormality on CT.  Mildly decreased uptake corresponding to left intraparenchymal hemorrhage   seen on CT from 3/16/2019 and 3/17/2019.

## 2019-03-21 NOTE — PROGRESS NOTE ADULT - ASSESSMENT
44 years old man with vascular risk factor of hypertension initially presented to urgent care center for evaluation of thunderclap headache on 3/12.  He was diagnosed to have "flu" and was discharged on Tamiflu. He presented to [facility] ED due to persistence of headache and was subsequently transferred to [facility] for further management. CT brain or admission showed left sylvian fissure subarachnoid hemorrhage and acute small ICH in the left posterior putamen/lentiform nuclei. CTA head to my eye shows large vessel intracranial vasculopathy involving left supraclinoid ICA and proximal MCA M1 segment (leading to critical stenosis versus occlusion) and was concerning for dilated lenticulostriate branches. TTE was grossly unremarkable and did not show any evidence of LVH. 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     Impression:  Left sylvian fissure SAH and posterior putaminal/lentiform nuclei ICH - likely etiology being rupture of dilated moyamoya type collateral blood vessels in the setting of large vessel intracranial vasculopathy/steno-occlusive disease - i.e. left moyamoya syndrome    NEURO: neurologically without acute change, sx control for headaches and pain - appears improved,  educated family and patient to return to ED if sx recur or any change in status, neurosurgery consulted for Dang Dang syndrome - s/p cerebral angiogram with findings suggestive of dang dang syndrome, no indications for statin therapy considering low 10-year ASCVD risk (1.7%) and non-atherosclerotic etiology of his large vessel intracranial vasculopathy, MR imaging as noted above, NM SPECT as noted above, w/out Diamox pending, anticipate STA-MCA Bypass for primary ischemic and secondary ICH stroke prevention anticipated in the next 2-4 weeks after repeat imaging, Physical therapy/OT eval for home with outpatient services.    ANTITHROMBOTIC THERAPY: None in the setting of ICH and no specific indications at this time     PULMONARY:  protecting airway, saturating well, ANITA workup as outpatient     CARDIOVASCULAR:  TTE: ef 68%, borderline pulmonary htn, cardiac monitoring with tachycardia,?? improved s/p IVF bolus - encouraged PO hydration                        SBP goal: very gradual normotension in the setting of large vessel intracranial vasculopathy    GASTROINTESTINAL:  dysphagia screen - passed, tolerating Regular diet       Diet: Regular    RENAL: BUN elevated/Cr without acute change, good urine output, maintain adequate hydration      Na Goal: Greater than 135     Dorsey: N    HEMATOLOGY: H/H without acute change?, no active signs of bleeding, Hypercoag panel sent- prelim findings within normal limits, will f/u rest of labs as outpatient. Hemoglobin electrophoresis sent to rule out possibility of sickle cell disease as it has been associated with moyamoya syndrome      DVT ppx: lovenox     ID: afebrile, leukocytosis downtrending?? - no overt si/sx of infection, will continue to monitor, RVP negative     OTHER: Plan of care discussed with patient and wife at bedside. All the questions were answered and concerns were addressed.     DISPOSITION: Home with outpatient services, anticipate discharge upon completion of pre/post Diamox SPECT scan    CORE MEASURES:        Admission NIHSS: 0     TPA: [] YES [x] NO      LDL/HDL: 85/38     Depression Screen: 0, esd upset about current condition- support provided, feels improved- deferred psych, no sihi, continue to monitor.      Statin Therapy: N     Dysphagia Screen: [x] PASS [] FAIL     Smoking [] YES [x] NO      Afib [] YES [x] NO     Stroke Education [x] YES [] NO 44 years old man with vascular risk factor of hypertension initially presented to urgent care center for evaluation of thunderclap headache on 3/12.  He was diagnosed to have "flu" and was discharged on Tamiflu. He presented to [facility] ED due to persistence of headache and was subsequently transferred to [facility] for further management. CT brain or admission showed left sylvian fissure subarachnoid hemorrhage and acute small ICH in the left posterior putamen/lentiform nuclei. CTA head to my eye shows large vessel intracranial vasculopathy involving left supraclinoid ICA and proximal MCA M1 segment (leading to critical stenosis versus occlusion) and was concerning for dilated lenticulostriate branches. TTE was grossly unremarkable and did not show any evidence of LVH. 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     Impression:  Left sylvian fissure SAH and posterior putaminal/lentiform nuclei ICH - likely etiology being rupture of dilated moyamoya type collateral blood vessels in the setting of large vessel intracranial vasculopathy/steno-occlusive disease - i.e. left moyamoya syndrome    NEURO: neurologically without acute change, sx control for headaches and pain - appears improved, found to be orthostatic positive- hydralazine discontinued and will monitor closely, educated family and patient to return to ED if sx recur or any change in status, neurosurgery consulted for Dang Dang syndrome - s/p cerebral angiogram with findings suggestive of dang dang syndrome, no indications for statin therapy considering low 10-year ASCVD risk (1.7%) and non-atherosclerotic etiology of his large vessel intracranial vasculopathy, MR imaging as noted above, NM SPECT as noted above, w/out Diamox pending, anticipate STA-MCA Bypass for primary ischemic and secondary ICH stroke prevention anticipated in the next 2-4 weeks after repeat imaging, Physical therapy/OT eval for home with outpatient services.    ANTITHROMBOTIC THERAPY: None in the setting of ICH and no specific indications at this time     PULMONARY:  protecting airway, saturating well, ANITA workup as outpatient     CARDIOVASCULAR:  TTE: ef 68%, borderline pulmonary htn, cardiac monitoring with tachycardia- plan for CTPA to rule out pulmonary embolus, orthostatic positive- hydralazine discontinued continue to monitor BP closely.                      SBP goal: very gradual normotension in the setting of large vessel intracranial vasculopathy    GASTROINTESTINAL:  dysphagia screen - passed, tolerating Regular diet       Diet: Regular    RENAL: BUN elevated/Cr without acute change- given 1 IVF bolus, good urine output, maintain adequate hydration     Na Goal: Greater than 135     Dorsey: N    HEMATOLOGY: H/H without acute change, no active signs of bleeding, Hypercoag panel sent- prelim findings within normal limits, will f/u rest of labs as outpatient. Hemoglobin electrophoresis sent to rule out possibility of sickle cell disease as it has been associated with moyamoya syndrome      DVT ppx: lovenox     ID: afebrile, leukocytosis downtrending - no overt si/sx of infection, will continue to monitor, RVP negative     OTHER: Plan of care discussed with patient and wife at bedside. All the questions were answered and concerns were addressed.     DISPOSITION: Home with outpatient services, anticipate discharge upon completion of pre/post Diamox SPECT scan and CTPA.     CORE MEASURES:        Admission NIHSS: 0     TPA: [] YES [x] NO      LDL/HDL: 85/38     Depression Screen: 0, esd upset about current condition- support provided, feels improved- deferred psych, no sihi, continue to monitor.      Statin Therapy: N     Dysphagia Screen: [x] PASS [] FAIL     Smoking [] YES [x] NO      Afib [] YES [x] NO     Stroke Education [x] YES [] NO 44 years old man with vascular risk factor of hypertension initially presented to urgent care center for evaluation of thunderclap headache on 3/12.  He was diagnosed to have "flu" and was discharged on Tamiflu. He presented to [facility] ED due to persistence of headache and was subsequently transferred to [facility] for further management. CT brain or admission showed left sylvian fissure subarachnoid hemorrhage and acute small ICH in the left posterior putamen/lentiform nuclei. CTA head to my eye shows large vessel intracranial vasculopathy involving left supraclinoid ICA and proximal MCA M1 segment (leading to critical stenosis versus occlusion) and was concerning for dilated lenticulostriate branches. TTE was grossly unremarkable and did not show any evidence of LVH. 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 Pre and post-Diamox SPECT showed decreased uptake in the right caudate/putamen and left lentiform nuclei in the region of ICH, without any significant change with or without Diamox.      Impression:  Left sylvian fissure SAH and posterior putaminal/lentiform nuclei ICH - likely etiology being rupture of dilated moyamoya type collateral blood vessels in the setting of large vessel intracranial vasculopathy/steno-occlusive disease - i.e. left moyamoya syndrome    NEURO: neurologically without acute change, sx control for headaches and pain - appears improved, found to be orthostatic positive- hydralazine discontinued and will monitor closely, educated family and patient to return to ED if sx recur or any change in status, neurosurgery consulted for Dang Dang syndrome - s/p cerebral angiogram with findings suggestive of dang dang syndrome, no indications for statin therapy considering low 10-year ASCVD risk (1.7%) and non-atherosclerotic etiology of his large vessel intracranial vasculopathy, MR imaging as noted above, NM SPECT as noted above, w/out Diamox pending, anticipate STA-MCA Bypass for primary ischemic and secondary ICH stroke prevention anticipated in the next 2-4 weeks after repeat imaging, Physical therapy/OT eval for home with outpatient services.    ANTITHROMBOTIC THERAPY: None in the setting of ICH and no specific indications at this time     PULMONARY:  protecting airway, saturating well, ANITA workup as outpatient     CARDIOVASCULAR:  TTE: LVEF 68%, borderline pulmonary HTN, cardiac monitoring with tachycardia - plan for CTPA to rule out pulmonary embolus, orthostatic positive- hydralazine discontinued continue to monitor BP closely.                      SBP goal: very gradual normotension in the setting of large vessel intracranial vasculopathy    GASTROINTESTINAL:  dysphagia screen - passed, tolerating Regular diet       Diet: Regular    RENAL: BUN elevated/Cr without acute change- given 1 IVF bolus, good urine output, maintain adequate hydration     Na Goal: Greater than 135     Dorsey: N    HEMATOLOGY: H/H without acute change, no active signs of bleeding, Hypercoag panel sent- prelim findings within normal limits, will f/u rest of labs as outpatient. Hemoglobin electrophoresis sent to rule out possibility of sickle cell disease as it has been associated with moyamoya syndrome      DVT ppx: lovenox     ID: afebrile, leukocytosis downtrending - no overt si/sx of infection, will continue to monitor, RVP negative     OTHER: Plan of care discussed with patient and wife at bedside. All the questions were answered and concerns were addressed.     DISPOSITION: Home with outpatient services, anticipate discharge upon completion of pre/post Diamox SPECT scan and CTPA.     CORE MEASURES:        Admission NIHSS: 0     TPA: [] YES [x] NO      LDL/HDL: 85/38     Depression Screen: 0, esd upset about current condition- support provided, feels improved- deferred psych, no sihi, continue to monitor.      Statin Therapy: N     Dysphagia Screen: [x] PASS [] FAIL     Smoking [] YES [x] NO      Afib [] YES [x] NO     Stroke Education [x] YES [] NO

## 2019-03-22 LAB
APCR PPP: 2.89 RATIO — SIGNIFICANT CHANGE UP
PROT S FREE PPP-ACNC: 93 % NORMAL — SIGNIFICANT CHANGE UP (ref 70–150)

## 2019-03-25 LAB
MTHFR GENE INTERPRETATION: SIGNIFICANT CHANGE UP
PTR INTERPRETATION: SIGNIFICANT CHANGE UP

## 2019-03-27 ENCOUNTER — OTHER (OUTPATIENT)
Age: 44
End: 2019-03-27

## 2019-03-27 ENCOUNTER — OUTPATIENT (OUTPATIENT)
Dept: OUTPATIENT SERVICES | Facility: HOSPITAL | Age: 44
LOS: 1 days | End: 2019-03-27
Payer: COMMERCIAL

## 2019-03-27 ENCOUNTER — RESULT CHARGE (OUTPATIENT)
Age: 44
End: 2019-03-27

## 2019-03-27 VITALS
TEMPERATURE: 98 F | HEIGHT: 69 IN | RESPIRATION RATE: 18 BRPM | WEIGHT: 214.95 LBS | DIASTOLIC BLOOD PRESSURE: 80 MMHG | SYSTOLIC BLOOD PRESSURE: 113 MMHG | HEART RATE: 102 BPM | OXYGEN SATURATION: 97 %

## 2019-03-27 DIAGNOSIS — Z01.818 ENCOUNTER FOR OTHER PREPROCEDURAL EXAMINATION: ICD-10-CM

## 2019-03-27 DIAGNOSIS — Z29.9 ENCOUNTER FOR PROPHYLACTIC MEASURES, UNSPECIFIED: ICD-10-CM

## 2019-03-27 DIAGNOSIS — I10 ESSENTIAL (PRIMARY) HYPERTENSION: ICD-10-CM

## 2019-03-27 DIAGNOSIS — I67.5 MOYAMOYA DISEASE: ICD-10-CM

## 2019-03-27 LAB
BLD GP AB SCN SERPL QL: NEGATIVE — SIGNIFICANT CHANGE UP
DNA PLOIDY SPEC FC-IMP: SIGNIFICANT CHANGE UP
HCT VFR BLD CALC: 40.1 % — SIGNIFICANT CHANGE UP (ref 39–50)
HGB BLD-MCNC: 14.3 G/DL — SIGNIFICANT CHANGE UP (ref 13–17)
MCHC RBC-ENTMCNC: 32.6 PG — SIGNIFICANT CHANGE UP (ref 27–34)
MCHC RBC-ENTMCNC: 35.8 GM/DL — SIGNIFICANT CHANGE UP (ref 32–36)
MCV RBC AUTO: 91.1 FL — SIGNIFICANT CHANGE UP (ref 80–100)
PLATELET # BLD AUTO: 290 K/UL — SIGNIFICANT CHANGE UP (ref 150–400)
RBC # BLD: 4.4 M/UL — SIGNIFICANT CHANGE UP (ref 4.2–5.8)
RBC # FLD: 12 % — SIGNIFICANT CHANGE UP (ref 10.3–14.5)
RH IG SCN BLD-IMP: POSITIVE — SIGNIFICANT CHANGE UP
WBC # BLD: 11.9 K/UL — HIGH (ref 3.8–10.5)
WBC # FLD AUTO: 11.9 K/UL — HIGH (ref 3.8–10.5)

## 2019-03-27 PROCEDURE — 87640 STAPH A DNA AMP PROBE: CPT

## 2019-03-27 PROCEDURE — 86901 BLOOD TYPING SEROLOGIC RH(D): CPT

## 2019-03-27 PROCEDURE — 86900 BLOOD TYPING SEROLOGIC ABO: CPT

## 2019-03-27 PROCEDURE — G0463: CPT

## 2019-03-27 PROCEDURE — 87641 MR-STAPH DNA AMP PROBE: CPT

## 2019-03-27 PROCEDURE — 86850 RBC ANTIBODY SCREEN: CPT

## 2019-03-27 PROCEDURE — 85027 COMPLETE CBC AUTOMATED: CPT

## 2019-03-27 RX ORDER — CEFAZOLIN SODIUM 1 G
2000 VIAL (EA) INJECTION ONCE
Qty: 0 | Refills: 0 | Status: COMPLETED | OUTPATIENT
Start: 2019-04-02 | End: 2019-04-02

## 2019-03-27 RX ORDER — LIDOCAINE HCL 20 MG/ML
0.2 VIAL (ML) INJECTION ONCE
Qty: 0 | Refills: 0 | Status: DISCONTINUED | OUTPATIENT
Start: 2019-04-02 | End: 2019-04-02

## 2019-03-27 RX ORDER — SODIUM CHLORIDE 9 MG/ML
3 INJECTION INTRAMUSCULAR; INTRAVENOUS; SUBCUTANEOUS EVERY 8 HOURS
Qty: 0 | Refills: 0 | Status: DISCONTINUED | OUTPATIENT
Start: 2019-04-02 | End: 2019-04-02

## 2019-03-27 NOTE — H&P PST ADULT - NEUROLOGICAL DETAILS
sensation intact/cranial nerves intact/no spontaneous movement/alert and oriented x 3/responds to verbal commands/normal strength

## 2019-03-27 NOTE — H&P PST ADULT - NSICDXPROBLEM_GEN_ALL_CORE_FT
PROBLEM DIAGNOSES  Problem: Need for prophylactic measure  Assessment and Plan: The Caprini score indicates that this patient is at high risk for a VTE event (score 6 or greater). Surgical patients in this group will benefit from both pharmacologic prophylaxis and intermittent compression devices.  The surgical team will determine the balance between VTE risk and bleeding risk, and other clinical considerations      Problem: HTN (hypertension)  Assessment and Plan: Pt to continue oral medications as prescribed. Followed by Cardiologist. Pt to continue monitoring B/P's at home as directed.     Problem: Moyamoya disease  Assessment and Plan: Pt scheduled for sx on 4/2/2019. Surgical and chlorhexidine instructions reviewed w/ pt and spouse.

## 2019-03-27 NOTE — H&P PST ADULT - NSICDXFAMILYHX_GEN_ALL_CORE_FT
FAMILY HISTORY:  Family history of atrial fibrillation, Father  Family history of coronary artery disease, Mother w/ stents  Family history of hypertension, Father/Mother

## 2019-03-27 NOTE — H&P PST ADULT - HISTORY OF PRESENT ILLNESS
45yo  male presents to [facility] for a Superficial Temporal Artery- Middle Cerebral Artery bypass on 4/2/2019. PMH: HTN. Pt initially presented to urgent care on 3/13/2019 c/o HA which later developed into intractable N/V, he was diagnosed w/ the Flu and was sent home on Tamiflu. HA/nausea/vomiting persisted, so patient went to [facility] ED where CT/CTA was done showing IPH in L ventral parietal lobe rupturing into subarachnoid space (no residual), CTA head w/ findings concerning for Flores Flores. Transferred to [facility] on 3/16/2019 for further workup. 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 Pt states saw Cardiologist earlier today for evaluation and neurologist to be seen tomorrow. Pt currently denies N&V, HA, chest pain or SOB and feels well otherwise. 43yo  male presents to [facility] for a Superficial Temporal Artery- Middle Cerebral Artery bypass on 4/2/2019. PMH: HTN. Pt initially presented to urgent care on 3/13/2019 c/o HA which later developed into intractable N/V, he was diagnosed w/ the Flu and was sent home on Tamiflu. HA/nausea/vomiting persisted, so patient went to [facility] ED where CT/CTA was done showing IPH in L ventral parietal lobe rupturing into subarachnoid space (no residual), CTA head w/ findings concerning for Flores Flores. Transferred to [facility] on 3/16/2019 for further workup. 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 Pt with recent SOB and tachycardia. CT done to r/o PE. Cardiologist seen earlier today for workup/evaluation and neurologist to be seen tomorrow. Pt currently denies N&V, HA, chest pain or SOB and feels well otherwise.

## 2019-03-27 NOTE — H&P PST ADULT - NSANTHOSAYNRD_GEN_A_CORE
No. ANITA screening performed.  STOP BANG Legend: 0-2 = LOW Risk; 3-4 = INTERMEDIATE Risk; 5-8 = HIGH Risk

## 2019-03-27 NOTE — H&P PST ADULT - NEGATIVE NEUROLOGICAL SYMPTOMS
no loss of sensation/no difficulty walking/no confusion/no weakness/no generalized seizures/no headache/no facial palsy/no syncope/no loss of consciousness/no hemiparesis/no paresthesias/no transient paralysis

## 2019-03-27 NOTE — H&P PST ADULT - ASSESSMENT
CAPRINI SCORE [CLOT updated 18]    AGE RELATED RISK FACTORS                                                       MOBILITY RELATED FACTORS  [ ] Age 41-60 years                                            (1 Point)                    [ ] Bed rest                                                        (1 Point)  [ ] Age: 61-74 years                                           (2 Points)                  [ ] Plaster cast                                                   (2 Points)  [ ] Age= 75 years                                              (3 Points)                    [ ] Bed bound for more than 72 hours                 (2 Points)    DISEASE RELATED RISK FACTORS                                               GENDER SPECIFIC FACTORS  [ ] Edema in the lower extremities                       (1 Point)              [ ] Pregnancy                                                     (1 Point)  [ ] Varicose veins                                               (1 Point)                     [ ] Post-partum < 6 weeks                                   (1 Point)             [ ] BMI > 25 Kg/m2                                            (1 Point)                     [ ] Hormonal therapy  or oral contraception          (1 Point)                 [ ] Sepsis (in the previous month)                        (1 Point)               [ ] History of pregnancy complications                 (1 point)  [ ] Pneumonia or serious lung disease                                               [ ] Unexplained or recurrent                     (1 Point)           (in the previous month)                               (1 Point)  [ ] Abnormal pulmonary function test                     (1 Point)                 SURGERY RELATED RISK FACTORS  [ ] Acute myocardial infarction                              (1 Point)               [ ]  Section                                             (1 Point)  [ ] Congestive heart failure (in the previous month)  (1 Point)      [ ] Minor surgery                                                  (1 Point)   [ ] Inflammatory bowel disease                             (1 Point)               [ ] Arthroscopic surgery                                        (2 Points)  [ ] Central venous access                                      (2 Points)                [ ] General surgery lasting more than 45 minutes (2 points)  [ ] Present or previous malignancy                     (2 Points)                [ ] Elective arthroplasty                                         (5 points)    [ ] Stroke (in the previous month)                          (5 Points)                                                                                                                                                           HEMATOLOGY RELATED FACTORS                                                 TRAUMA RELATED RISK FACTORS  [ ] Prior episodes of VTE                                     (3 Points)                [ ] Fracture of the hip, pelvis, or leg                       (5 Points)  [ ] Positive family history for VTE                         (3 Points)             [ ] Acute spinal cord injury (in the previous month)  (5 Points)  [ ] Prothrombin 08856 A                                     (3 Points)               [ ] Paralysis  (less than 1 month)                             (5 Points)  [ ] Factor V Leiden                                             (3 Points)                  [ ] Multiple Trauma within 1 month                        (5 Points)  [ ] Lupus anticoagulants                                     (3 Points)                                                           [ ] Anticardiolipin antibodies                               (3 Points)                                                       [ ] High homocysteine in the blood                      (3 Points)                                             [ ] Other congenital or acquired thrombophilia      (3 Points)                                                [ ] Heparin induced thrombocytopenia                  (3 Points)                                     Total Score [ 9  ]

## 2019-03-28 ENCOUNTER — APPOINTMENT (OUTPATIENT)
Dept: NEUROLOGY | Facility: CLINIC | Age: 44
End: 2019-03-28
Payer: COMMERCIAL

## 2019-03-28 VITALS
HEART RATE: 97 BPM | WEIGHT: 210 LBS | SYSTOLIC BLOOD PRESSURE: 114 MMHG | DIASTOLIC BLOOD PRESSURE: 74 MMHG | HEIGHT: 70 IN | BODY MASS INDEX: 30.06 KG/M2

## 2019-03-28 DIAGNOSIS — R06.83 SNORING: ICD-10-CM

## 2019-03-28 LAB
MRSA PCR RESULT.: SIGNIFICANT CHANGE UP
S AUREUS DNA NOSE QL NAA+PROBE: SIGNIFICANT CHANGE UP

## 2019-03-28 PROCEDURE — 99495 TRANSJ CARE MGMT MOD F2F 14D: CPT

## 2019-03-29 ENCOUNTER — OTHER (OUTPATIENT)
Age: 44
End: 2019-03-29

## 2019-04-01 ENCOUNTER — TRANSCRIPTION ENCOUNTER (OUTPATIENT)
Age: 44
End: 2019-04-01

## 2019-04-01 PROBLEM — I67.5 MOYAMOYA DISEASE: Chronic | Status: ACTIVE | Noted: 2019-03-27

## 2019-04-02 ENCOUNTER — APPOINTMENT (OUTPATIENT)
Dept: NEUROSURGERY | Facility: HOSPITAL | Age: 44
End: 2019-04-02
Payer: COMMERCIAL

## 2019-04-02 ENCOUNTER — INPATIENT (INPATIENT)
Facility: HOSPITAL | Age: 44
LOS: 1 days | Discharge: HOME | DRG: 27 | End: 2019-04-04
Attending: NEUROLOGICAL SURGERY | Admitting: NEUROLOGICAL SURGERY
Payer: COMMERCIAL

## 2019-04-02 ENCOUNTER — APPOINTMENT (OUTPATIENT)
Dept: NEUROSURGERY | Facility: CLINIC | Age: 44
End: 2019-04-02
Payer: COMMERCIAL

## 2019-04-02 VITALS
HEIGHT: 69 IN | OXYGEN SATURATION: 99 % | RESPIRATION RATE: 20 BRPM | HEART RATE: 84 BPM | TEMPERATURE: 98 F | WEIGHT: 214.95 LBS | DIASTOLIC BLOOD PRESSURE: 78 MMHG | SYSTOLIC BLOOD PRESSURE: 120 MMHG

## 2019-04-02 DIAGNOSIS — I67.5 MOYAMOYA DISEASE: ICD-10-CM

## 2019-04-02 LAB
ANION GAP SERPL CALC-SCNC: 12 MMOL/L — SIGNIFICANT CHANGE UP (ref 5–17)
BLD GP AB SCN SERPL QL: NEGATIVE — SIGNIFICANT CHANGE UP
BUN SERPL-MCNC: 13 MG/DL — SIGNIFICANT CHANGE UP (ref 7–23)
CALCIUM SERPL-MCNC: 8.7 MG/DL — SIGNIFICANT CHANGE UP (ref 8.4–10.5)
CHLORIDE SERPL-SCNC: 108 MMOL/L — SIGNIFICANT CHANGE UP (ref 96–108)
CO2 SERPL-SCNC: 23 MMOL/L — SIGNIFICANT CHANGE UP (ref 22–31)
CREAT SERPL-MCNC: 0.99 MG/DL — SIGNIFICANT CHANGE UP (ref 0.5–1.3)
GAS PNL BLDA: SIGNIFICANT CHANGE UP
GAS PNL BLDA: SIGNIFICANT CHANGE UP
GLUCOSE SERPL-MCNC: 138 MG/DL — HIGH (ref 70–99)
HCT VFR BLD CALC: 33.9 % — LOW (ref 39–50)
HGB BLD-MCNC: 11.8 G/DL — LOW (ref 13–17)
MAGNESIUM SERPL-MCNC: 2.3 MG/DL — SIGNIFICANT CHANGE UP (ref 1.6–2.6)
MCHC RBC-ENTMCNC: 31.6 PG — SIGNIFICANT CHANGE UP (ref 27–34)
MCHC RBC-ENTMCNC: 34.8 GM/DL — SIGNIFICANT CHANGE UP (ref 32–36)
MCV RBC AUTO: 90.8 FL — SIGNIFICANT CHANGE UP (ref 80–100)
PHOSPHATE SERPL-MCNC: 3.6 MG/DL — SIGNIFICANT CHANGE UP (ref 2.5–4.5)
PLATELET # BLD AUTO: 228 K/UL — SIGNIFICANT CHANGE UP (ref 150–400)
POTASSIUM SERPL-MCNC: 4.2 MMOL/L — SIGNIFICANT CHANGE UP (ref 3.5–5.3)
POTASSIUM SERPL-SCNC: 4.2 MMOL/L — SIGNIFICANT CHANGE UP (ref 3.5–5.3)
RBC # BLD: 3.74 M/UL — LOW (ref 4.2–5.8)
RBC # FLD: 11.6 % — SIGNIFICANT CHANGE UP (ref 10.3–14.5)
RH IG SCN BLD-IMP: POSITIVE — SIGNIFICANT CHANGE UP
SODIUM SERPL-SCNC: 143 MMOL/L — SIGNIFICANT CHANGE UP (ref 135–145)
WBC # BLD: 8.6 K/UL — SIGNIFICANT CHANGE UP (ref 3.8–10.5)
WBC # FLD AUTO: 8.6 K/UL — SIGNIFICANT CHANGE UP (ref 3.8–10.5)

## 2019-04-02 PROCEDURE — 69990 MICROSURGERY ADD-ON: CPT | Mod: 80,59

## 2019-04-02 PROCEDURE — 69990 MICROSURGERY ADD-ON: CPT | Mod: 59

## 2019-04-02 PROCEDURE — 64999 UNLISTED PX NERVOUS SYSTEM: CPT | Mod: 62

## 2019-04-02 PROCEDURE — 61781 SCAN PROC CRANIAL INTRA: CPT

## 2019-04-02 PROCEDURE — 99291 CRITICAL CARE FIRST HOUR: CPT

## 2019-04-02 PROCEDURE — 92240 ICG ANGIOGRAPHY I&R UNI/BI: CPT | Mod: 26

## 2019-04-02 PROCEDURE — 61711 FUSION OF SKULL ARTERIES: CPT | Mod: 62

## 2019-04-02 PROCEDURE — 93888 INTRACRANIAL LIMITED STUDY: CPT | Mod: 26

## 2019-04-02 RX ORDER — ACETAMINOPHEN 500 MG
1000 TABLET ORAL ONCE
Qty: 0 | Refills: 0 | Status: COMPLETED | OUTPATIENT
Start: 2019-04-02 | End: 2019-04-02

## 2019-04-02 RX ORDER — LEVETIRACETAM 250 MG/1
500 TABLET, FILM COATED ORAL EVERY 12 HOURS
Qty: 0 | Refills: 0 | Status: DISCONTINUED | OUTPATIENT
Start: 2019-04-02 | End: 2019-04-04

## 2019-04-02 RX ORDER — CEFAZOLIN SODIUM 1 G
2000 VIAL (EA) INJECTION EVERY 8 HOURS
Qty: 0 | Refills: 0 | Status: COMPLETED | OUTPATIENT
Start: 2019-04-02 | End: 2019-04-03

## 2019-04-02 RX ORDER — HYDROMORPHONE HYDROCHLORIDE 2 MG/ML
0.5 INJECTION INTRAMUSCULAR; INTRAVENOUS; SUBCUTANEOUS
Qty: 0 | Refills: 0 | Status: DISCONTINUED | OUTPATIENT
Start: 2019-04-02 | End: 2019-04-02

## 2019-04-02 RX ORDER — AMLODIPINE BESYLATE 2.5 MG/1
10 TABLET ORAL DAILY
Qty: 0 | Refills: 0 | Status: DISCONTINUED | OUTPATIENT
Start: 2019-04-02 | End: 2019-04-04

## 2019-04-02 RX ORDER — ONDANSETRON 8 MG/1
4 TABLET, FILM COATED ORAL EVERY 6 HOURS
Qty: 0 | Refills: 0 | Status: DISCONTINUED | OUTPATIENT
Start: 2019-04-02 | End: 2019-04-04

## 2019-04-02 RX ORDER — PANTOPRAZOLE SODIUM 20 MG/1
40 TABLET, DELAYED RELEASE ORAL
Qty: 0 | Refills: 0 | Status: DISCONTINUED | OUTPATIENT
Start: 2019-04-02 | End: 2019-04-04

## 2019-04-02 RX ORDER — ONDANSETRON 8 MG/1
4 TABLET, FILM COATED ORAL ONCE
Qty: 0 | Refills: 0 | Status: DISCONTINUED | OUTPATIENT
Start: 2019-04-02 | End: 2019-04-02

## 2019-04-02 RX ORDER — SODIUM CHLORIDE 9 MG/ML
1000 INJECTION INTRAMUSCULAR; INTRAVENOUS; SUBCUTANEOUS
Qty: 0 | Refills: 0 | Status: DISCONTINUED | OUTPATIENT
Start: 2019-04-02 | End: 2019-04-03

## 2019-04-02 RX ORDER — DOCUSATE SODIUM 100 MG
100 CAPSULE ORAL THREE TIMES A DAY
Qty: 0 | Refills: 0 | Status: DISCONTINUED | OUTPATIENT
Start: 2019-04-02 | End: 2019-04-04

## 2019-04-02 RX ORDER — ASPIRIN/CALCIUM CARB/MAGNESIUM 324 MG
650 TABLET ORAL ONCE
Qty: 0 | Refills: 0 | Status: COMPLETED | OUTPATIENT
Start: 2019-04-02 | End: 2019-04-02

## 2019-04-02 RX ORDER — OXYCODONE AND ACETAMINOPHEN 5; 325 MG/1; MG/1
1 TABLET ORAL EVERY 4 HOURS
Qty: 0 | Refills: 0 | Status: DISCONTINUED | OUTPATIENT
Start: 2019-04-02 | End: 2019-04-03

## 2019-04-02 RX ORDER — HYDROMORPHONE HYDROCHLORIDE 2 MG/ML
0.5 INJECTION INTRAMUSCULAR; INTRAVENOUS; SUBCUTANEOUS EVERY 4 HOURS
Qty: 0 | Refills: 0 | Status: DISCONTINUED | OUTPATIENT
Start: 2019-04-02 | End: 2019-04-03

## 2019-04-02 RX ORDER — ASPIRIN/CALCIUM CARB/MAGNESIUM 324 MG
81 TABLET ORAL DAILY
Qty: 0 | Refills: 0 | Status: DISCONTINUED | OUTPATIENT
Start: 2019-04-02 | End: 2019-04-04

## 2019-04-02 RX ORDER — LOSARTAN POTASSIUM 100 MG/1
100 TABLET, FILM COATED ORAL DAILY
Qty: 0 | Refills: 0 | Status: DISCONTINUED | OUTPATIENT
Start: 2019-04-02 | End: 2019-04-04

## 2019-04-02 RX ORDER — HYDROMORPHONE HYDROCHLORIDE 2 MG/ML
1 INJECTION INTRAMUSCULAR; INTRAVENOUS; SUBCUTANEOUS
Qty: 0 | Refills: 0 | Status: DISCONTINUED | OUTPATIENT
Start: 2019-04-02 | End: 2019-04-02

## 2019-04-02 RX ORDER — SENNA PLUS 8.6 MG/1
2 TABLET ORAL AT BEDTIME
Qty: 0 | Refills: 0 | Status: DISCONTINUED | OUTPATIENT
Start: 2019-04-02 | End: 2019-04-04

## 2019-04-02 RX ORDER — MINOCYCLINE HYDROCHLORIDE 45 MG/1
200 TABLET, EXTENDED RELEASE ORAL
Qty: 0 | Refills: 0 | Status: DISCONTINUED | OUTPATIENT
Start: 2019-04-02 | End: 2019-04-04

## 2019-04-02 RX ORDER — DEXAMETHASONE 0.5 MG/5ML
4 ELIXIR ORAL EVERY 6 HOURS
Qty: 0 | Refills: 0 | Status: DISCONTINUED | OUTPATIENT
Start: 2019-04-02 | End: 2019-04-03

## 2019-04-02 RX ADMIN — Medication 100 MILLIGRAM(S): at 22:06

## 2019-04-02 RX ADMIN — Medication 4 MILLIGRAM(S): at 18:20

## 2019-04-02 RX ADMIN — Medication 650 MILLIGRAM(S): at 08:03

## 2019-04-02 RX ADMIN — Medication 400 MILLIGRAM(S): at 19:00

## 2019-04-02 RX ADMIN — Medication 4 MILLIGRAM(S): at 23:00

## 2019-04-02 RX ADMIN — SODIUM CHLORIDE 3 MILLILITER(S): 9 INJECTION INTRAMUSCULAR; INTRAVENOUS; SUBCUTANEOUS at 07:49

## 2019-04-02 RX ADMIN — ONDANSETRON 4 MILLIGRAM(S): 8 TABLET, FILM COATED ORAL at 19:00

## 2019-04-02 RX ADMIN — MINOCYCLINE HYDROCHLORIDE 200 MILLIGRAM(S): 45 TABLET, EXTENDED RELEASE ORAL at 20:37

## 2019-04-02 RX ADMIN — Medication 1000 MILLIGRAM(S): at 19:30

## 2019-04-02 RX ADMIN — SODIUM CHLORIDE 75 MILLILITER(S): 9 INJECTION INTRAMUSCULAR; INTRAVENOUS; SUBCUTANEOUS at 20:38

## 2019-04-02 RX ADMIN — LEVETIRACETAM 500 MILLIGRAM(S): 250 TABLET, FILM COATED ORAL at 20:37

## 2019-04-02 NOTE — PRE-ANESTHESIA EVALUATION ADULT - NSANTHPMHFT_GEN_ALL_CORE
patient had worst headache of life and was found to have intraparenchymal hemorrhage ~3 weeks ago.  CTA suggestive of moyamoya disease.  Patient also had SOB and tachycardia, CTA negative for PE.

## 2019-04-02 NOTE — PRE-ANESTHESIA EVALUATION ADULT - TEMPERATURE IN CELSIUS (DEGREES C)
80    Cancer Father     Cancer Brother 55        Identical Twin       CareTeam (Including outside providers/suppliers regularly involved in providing care):   Patient Care Team:  Cherelle Dexter MD as PCP - Denita Barnes MD as PCP - [facility] Attributed Provider  Nely Matthew MD as Consulting Physician (Cardiology)  Navin Childers MD as Surgeon (Urology)  Cammie Olivo MD as Consulting Physician (Pulmonary Disease)  Trina Estrada MD as Surgeon (Gastroenterology)  Rashad Ordoñez 
36.9

## 2019-04-02 NOTE — PRE-ANESTHESIA EVALUATION ADULT - NSANTHAIRWAYFT_ENT_ALL_CORE
large neck, good dentition, MP2, thyromental distance >3 fingerbreadths, interincisor distance >2 fingerbreadths

## 2019-04-02 NOTE — PROGRESS NOTE ADULT - SUBJECTIVE AND OBJECTIVE BOX
43yo man PMH HTN, small L parietal IPH 03/2019, w/u c/w moyamoya syndrome, now s/p elective L crani and STA-MCA bypass    minimal HA, +nausea, no vomiting, no CP/SOB/abd pain  Vitals/labs/meds/imaging reviewed  alert, fully oriented, PERRL, EOMI, FS  BUE no drift 5/5  BLE 5/5  rrr  clear lungs  soft abd  wwp ext

## 2019-04-02 NOTE — PROGRESS NOTE ADULT - ASSESSMENT
POD#0 STA-MCA bypass    NEURO:  CTA in am  decadron and keppra post op per neurosurgery  pain control  Activity: [x] OOB as tolerated [] Bedrest [x] PT [x] OT [] PMNR    PULM:  Incentive spirometry, mobilize as tolerated    CV:  -150mmHg, d/c a-line in AM  cont home oral antihypertensives    RENAL:  IVL    GI:  Diet: passed dysphagia, advance diet as tolerated  GI prophylaxis [] not indicated [] PPI [] other:  Bowel regimen [x] colace [x] senna [] other:    ENDO:   Goal euglycemia (-180)    HEME/ONC:  VTE prophylaxis: [x] SCDs [] chemoprophylaxis [x] hold chemoprophylaxis due to: fresh post op [] high risk of DVT/PE on admission due to:    ID:  Sri-op antibiotics    MISC:    SOCIAL/FAMILY:  [] awaiting [x] updated at bedside [] family meeting    CODE STATUS:  [x] Full Code [] DNR [] DNI [] Palliative/Comfort Care    DISPOSITION:  [x] ICU [] Stroke Unit [] Floor [] EMU [] RCU [] PCU    [x] Patient is at high risk of neurologic deterioration/death due to: stroke, post op hemorrhage, cerebral edema    Time spent: 45 critical care minutes    Contact: [phone]

## 2019-04-03 ENCOUNTER — APPOINTMENT (OUTPATIENT)
Dept: NEUROSURGERY | Facility: CLINIC | Age: 44
End: 2019-04-03

## 2019-04-03 ENCOUNTER — TRANSCRIPTION ENCOUNTER (OUTPATIENT)
Age: 44
End: 2019-04-03

## 2019-04-03 PROCEDURE — 99291 CRITICAL CARE FIRST HOUR: CPT

## 2019-04-03 PROCEDURE — 70496 CT ANGIOGRAPHY HEAD: CPT | Mod: 26

## 2019-04-03 RX ORDER — DEXAMETHASONE 0.5 MG/5ML
4 ELIXIR ORAL EVERY 8 HOURS
Qty: 0 | Refills: 0 | Status: DISCONTINUED | OUTPATIENT
Start: 2019-04-03 | End: 2019-04-03

## 2019-04-03 RX ORDER — ENOXAPARIN SODIUM 100 MG/ML
40 INJECTION SUBCUTANEOUS AT BEDTIME
Qty: 0 | Refills: 0 | Status: DISCONTINUED | OUTPATIENT
Start: 2019-04-03 | End: 2019-04-04

## 2019-04-03 RX ORDER — OXYCODONE HYDROCHLORIDE 5 MG/1
10 TABLET ORAL EVERY 4 HOURS
Qty: 0 | Refills: 0 | Status: DISCONTINUED | OUTPATIENT
Start: 2019-04-03 | End: 2019-04-04

## 2019-04-03 RX ORDER — OXYCODONE HYDROCHLORIDE 5 MG/1
5 TABLET ORAL EVERY 4 HOURS
Qty: 0 | Refills: 0 | Status: DISCONTINUED | OUTPATIENT
Start: 2019-04-03 | End: 2019-04-04

## 2019-04-03 RX ORDER — ACETAMINOPHEN 500 MG
1000 TABLET ORAL ONCE
Qty: 0 | Refills: 0 | Status: COMPLETED | OUTPATIENT
Start: 2019-04-03 | End: 2019-04-03

## 2019-04-03 RX ADMIN — Medication 4 MILLIGRAM(S): at 05:15

## 2019-04-03 RX ADMIN — Medication 400 MILLIGRAM(S): at 21:48

## 2019-04-03 RX ADMIN — PANTOPRAZOLE SODIUM 40 MILLIGRAM(S): 20 TABLET, DELAYED RELEASE ORAL at 07:49

## 2019-04-03 RX ADMIN — Medication 1000 MILLIGRAM(S): at 22:18

## 2019-04-03 RX ADMIN — Medication 100 MILLIGRAM(S): at 05:14

## 2019-04-03 RX ADMIN — AMLODIPINE BESYLATE 10 MILLIGRAM(S): 2.5 TABLET ORAL at 05:15

## 2019-04-03 RX ADMIN — Medication 100 MILLIGRAM(S): at 13:39

## 2019-04-03 RX ADMIN — ENOXAPARIN SODIUM 40 MILLIGRAM(S): 100 INJECTION SUBCUTANEOUS at 21:48

## 2019-04-03 RX ADMIN — MINOCYCLINE HYDROCHLORIDE 200 MILLIGRAM(S): 45 TABLET, EXTENDED RELEASE ORAL at 05:15

## 2019-04-03 RX ADMIN — LEVETIRACETAM 500 MILLIGRAM(S): 250 TABLET, FILM COATED ORAL at 05:14

## 2019-04-03 RX ADMIN — MINOCYCLINE HYDROCHLORIDE 200 MILLIGRAM(S): 45 TABLET, EXTENDED RELEASE ORAL at 17:05

## 2019-04-03 RX ADMIN — Medication 81 MILLIGRAM(S): at 11:53

## 2019-04-03 RX ADMIN — LOSARTAN POTASSIUM 100 MILLIGRAM(S): 100 TABLET, FILM COATED ORAL at 05:14

## 2019-04-03 RX ADMIN — LEVETIRACETAM 500 MILLIGRAM(S): 250 TABLET, FILM COATED ORAL at 17:04

## 2019-04-03 NOTE — PHYSICAL THERAPY INITIAL EVALUATION ADULT - CRITERIA FOR SKILLED THERAPEUTIC INTERVENTIONS
functional limitations in following categories/predicted duration of therapy intervention/risk reduction/prevention/impairments found/rehab potential/therapy frequency/anticipated discharge recommendation

## 2019-04-03 NOTE — PROGRESS NOTE ADULT - ASSESSMENT
POD#1 STA-MCA bypass    NEURO:  CTA in am  decadron and keppra post op per neurosurgery  pain control  Activity: [x] OOB as tolerated [] Bedrest [x] PT [x] OT [] PMNR    PULM:  Incentive spirometry, mobilize as tolerated    CV:  -150mmHg, d/c a-line in AM  cont home oral antihypertensives    RENAL:  IVL    GI:  Diet: passed dysphagia, advance diet as tolerated  GI prophylaxis [] not indicated [] PPI [] other:  Bowel regimen [x] colace [x] senna [] other:    ENDO:   Goal euglycemia (-180)    HEME/ONC:  VTE prophylaxis: [x] SCDs [] chemoprophylaxis [x] hold chemoprophylaxis due to: fresh post op [] high risk of DVT/PE on admission due to:    ID:  Sri-op antibiotics    MISC:    SOCIAL/FAMILY:  [] awaiting [x] updated at bedside [] family meeting    CODE STATUS:  [x] Full Code [] DNR [] DNI [] Palliative/Comfort Care    DISPOSITION:  [x] ICU [] Stroke Unit [] Floor [] EMU [] RCU [] PCU    [x] Patient is at high risk of neurologic deterioration/death due to: stroke, post op hemorrhage, cerebral edema    Time spent: 45 critical care minutes    Contact: [phone]

## 2019-04-03 NOTE — PHYSICAL THERAPY INITIAL EVALUATION ADULT - PERTINENT HX OF CURRENT PROBLEM, REHAB EVAL
45yo  male with pmhx HTN presents for scheduled Superficial Temporal Artery- Middle Cerebral Artery bypass. Pt initially presented to urgent care on 3/13/2019 c/o HA which later developed into intractable N/V, he was diagnosed w/ the Flu and was sent home on Tamiflu. HA/nausea/vomiting persisted, so pt went to [facility] ED where CT/CTA was done showing IPH in L ventral parietal lobe rupturing into subarachnoid space (no residual), CTA head w/ findings concerning for Flores Flores.

## 2019-04-03 NOTE — PHYSICAL THERAPY INITIAL EVALUATION ADULT - PLANNED THERAPY INTERVENTIONS, PT EVAL
stair training; GOAL: Pt will negotiate up & down 12 stairs independently with unilateral HR & , in 2 weeks./balance training/bed mobility training/gait training

## 2019-04-03 NOTE — PHYSICAL THERAPY INITIAL EVALUATION ADULT - PHYSICAL ASSIST/NONPHYSICAL ASSIST: SUPINE/SIT, REHAB EVAL
verbal cues/nonverbal cues (demo/gestures) 1 person assist/verbal cues/nonverbal cues (demo/gestures)

## 2019-04-03 NOTE — DISCHARGE NOTE NURSING/CASE MANAGEMENT/SOCIAL WORK - NSDCDPATPORTLINK_GEN_ALL_CORE
You can access the [facility] Patient Portal, offered by [facility], by registering with the following website: [email or web address]

## 2019-04-03 NOTE — PHYSICAL THERAPY INITIAL EVALUATION ADULT - LIVES WITH, PROFILE
Pt resides with spouse in private home with 2 steps to enter with bilateral handrails and full flight to second floor bedroom/bathroom with bilateral handrails FDC up then unilateral handrail.  Prior to admission pt was independent with all functional mobility and ambulating without AD./spouse

## 2019-04-03 NOTE — PROGRESS NOTE ADULT - ASSESSMENT
ASSESSMENT/PLAN:  Hx Dang-dang disease- S/P L STA-MCA bypass    NEURO:  Neuro checks q 4 hrs  Decadron per NSG  keppra per NSG- discuss d/c prior to hospital discharge  CTA today at 0830    Activity: [X] OOB as tolerated     PULM: Clear    CV:   SBP goal- 100-<140    RENAL: D/C blue  IVL      GI:  Diet: regular diet  GI prophylaxis  [X] PPI   Bowel regimen [X] colace [X] senna    ENDO:   Goal euglycemia (-180)    HEME/ONC:  VTE prophylaxis: [X] SCDs [X] chemoprophylaxis today lovenox 40mg q day    ID: afebrile    SOCIAL/FAMILY:  [X] updated at bedside     CODE STATUS:  [X] Full Code     DISPOSITION:  [X] ICU     [x] Patient is cleared for floor    Time seen: 0820  Time spent: 40] critical care minutes ASSESSMENT/PLAN:  Hx Dang-dang disease- S/P L STA-MCA bypass    NEURO:  Neuro checks q 4 hrs  continue ASA  Decadron per NSG  keppra per NSG- discuss d/c prior to hospital discharge  CTA today at 0830  Minocycline treat prevention of reperfusion injury for total 5 days      Activity: [X] OOB as tolerated     PULM: Clear    CV:   SBP goal- 100-<140  Resume amlodipine and losartan     RENAL: D/C blue  IVL      GI:  Diet: regular diet  GI prophylaxis  [X] PPI   Bowel regimen [X] colace [X] senna    ENDO:   Goal euglycemia (-180)    HEME/ONC:  VTE prophylaxis: [X] SCDs [X] chemoprophylaxis today lovenox 40mg q day    ID: afebrile    SOCIAL/FAMILY:  [X] updated at bedside     CODE STATUS:  [X] Full Code     DISPOSITION:  [X] ICU     [x] Patient is cleared for floor    Time seen: 0820  Time spent: 40] critical care minutes ASSESSMENT/PLAN:  Hx Dang-dang disease- S/P L STA-MCA bypass    NEURO:  Neuro checks q 4 hrs  continue ASA  Decadron per NSG-wean to 3mg q8  keppra per NSG- discuss d/c prior to hospital discharge  CTA today at 0830  Minocycline treat prevention of reperfusion injury for total 5 days      Activity: [X] OOB as tolerated     PULM: Clear    CV:   SBP goal- 100-<140  Resume amlodipine and losartan     RENAL: D/C blue  IVL      GI:  Diet: regular diet  GI prophylaxis  [X] PPI   Bowel regimen [X] colace [X] senna    ENDO:   Goal euglycemia (-180)    HEME/ONC:  VTE prophylaxis: [X] SCDs [X] chemoprophylaxis today lovenox 40mg q day    ID: afebrile    SOCIAL/FAMILY:  [X] updated at bedside     CODE STATUS:  [X] Full Code     DISPOSITION:  [X] ICU     [x] Patient is cleared for floor    Time seen: 0820  Time spent: 40] critical care minutes ASSESSMENT/PLAN:  Hx Dang-dang disease- S/P L STA-MCA bypass    NEURO:  Neuro checks q 4 hrs  Repeat CT in am  continue ASA  Decadron per NSG-wean to 3mg q8  keppra per NSG- discuss d/c prior to hospital discharge  CTA today at 0830  Minocycline treat prevention of reperfusion injury for total 5 days      Activity: [X] OOB as tolerated     PULM: Clear    CV:   SBP goal- 100-<140  Resume amlodipine and losartan     RENAL: D/C blue  IVL      GI:  Diet: regular diet  GI prophylaxis  [X] PPI   Bowel regimen [X] colace [X] senna    ENDO:   Goal euglycemia (-180)    HEME/ONC:  VTE prophylaxis: [X] SCDs [X] chemoprophylaxis today lovenox 40mg q day    ID: afebrile    SOCIAL/FAMILY:  [X] updated at bedside     CODE STATUS:  [X] Full Code     DISPOSITION:  [X] ICU     [x] Patient is cleared for floor    Time seen: 0820  Time spent: 40] critical care minutes

## 2019-04-03 NOTE — PROGRESS NOTE ADULT - SUBJECTIVE AND OBJECTIVE BOX
SUMMARY:HPI:  43yo  male presents to [facility] for a Superficial Temporal Artery- Middle Cerebral Artery bypass on 4/2/2019. PMH: HTN. Pt initially presented to urgent care on 3/13/2019 c/o HA which later developed into intractable N/V, he was diagnosed w/ the Flu and was sent home on Tamiflu. HA/nausea/vomiting persisted, so patient went to [facility] ED where CT/CTA was done showing IPH in L ventral parietal lobe rupturing into subarachnoid space (no residual), CTA head w/ findings concerning for Flores Flores. Transferred to [facility] on 3/16/2019 for further workup. 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 Pt with recent SOB and tachycardia. CT done to r/o PE. Cardiologist seen earlier for workup/evaluation. Pt currently denies N&V, HA, chest pain or SOB and feels well otherwise.   OVERNIGHT EVENTS: None    ADMISSION SCORES:   GCS:15    Allergies    No Known Allergies    Intolerances        REVIEW OF SYSTEMS:    [X All ROS addressed below are non-contributory,   Neuro: [ ] Headache [ ] Back pain [ ] Numbness [ ] Weakness [ ] Ataxia [ ] Dizziness [ ] Aphasia [ ] Dysarthria [ ] Visual disturbance  Resp: [ ] Shortness of breath/dyspnea, [ ] Orthopnea [ ] Cough  CV: [ ] Chest pain [ ] Palpitation [ ] Lightheadedness [ ] Syncope  Renal: [ ] Thirst [ ] Edema  GI: [ ] Nausea [ ] Emesis [ ] Abdominal pain [ ] Constipation [ ] Diarrhea  Hem: [ ] Hematemesis [ ] bright red blood per rectum  ID: [ ] Fever [ ] Chills [ ] Dysuria  ENT: [ ] Rhinorrhea    DEVICES:        VITALS: [X ] Reviewed  Vital Signs Last 24 Hrs  T(C): 36.8 (03 Apr 2019 07:00), Max: 36.9 (02 Apr 2019 18:50)  T(F): 98.2 (03 Apr 2019 07:00), Max: 98.4 (02 Apr 2019 18:50)  HR: 108 (03 Apr 2019 08:00) (96 - 110)  BP: 116/68 (02 Apr 2019 18:30) (98/59 - 116/68)  BP(mean): 85 (02 Apr 2019 18:00) (72 - 85)  RR: 15 (03 Apr 2019 08:00) (11 - 16)  SpO2: 97% (03 Apr 2019 08:00) (97% - 100%)  CAPILLARY BLOOD GLUCOSE            LABS:    04-02    143  |  108  |  13  ----------------------------<  138<H>  4.2   |  23  |  0.99    Ca    8.7      02 Apr 2019 18:08  Phos  3.6     04-02  Mg     2.3     04-02                            11.8   8.6   )-----------( 228      ( 02 Apr 2019 18:08 )             33.9       STROKE CORE MEASURES:   Hemoglobin A1C, Whole Blood: 4.6 % (03-17 @ 08:50)     MEDICATION LEVELS:     IMAGING/DATA: [X ] Reviewed    IVF FLUIDS/MEDICATIONS: [ ] Reviewed  MEDICATIONS  (STANDING):  amLODIPine   Tablet 10 milliGRAM(s) Oral daily  aspirin  chewable 81 milliGRAM(s) Oral daily  dexamethasone  Injectable 4 milliGRAM(s) IV Push every 6 hours  docusate sodium 100 milliGRAM(s) Oral three times a day  levETIRAcetam 500 milliGRAM(s) Oral every 12 hours  losartan 100 milliGRAM(s) Oral daily  minocycline 200 milliGRAM(s) Oral two times a day  pantoprazole    Tablet 40 milliGRAM(s) Oral before breakfast  senna 2 Tablet(s) Oral at bedtime    MEDICATIONS  (PRN):  ondansetron Injectable 4 milliGRAM(s) IV Push every 6 hours PRN Nausea and/or Vomiting  oxyCODONE    IR 5 milliGRAM(s) Oral every 4 hours PRN Moderate Pain (4 - 6)  oxyCODONE    IR 10 milliGRAM(s) Oral every 4 hours PRN Severe Pain (7 - 10)    I&O's Summary    02 Apr 2019 07:01  -  03 Apr 2019 07:00  --------------------------------------------------------  IN: 1030 mL / OUT: 1720 mL / NET: -690 mL        EXAMINATION:  PHYSICAL EXAM:    Constitutional: No Acute Distress     Neurological: Awake, alert oriented to person, place and time, Following Commands, PERRL, EOMI, No Gaze Preference, Face Symmetrical, Speech Fluent, No dysmetria, No ataxia, No nystagmus     Motor exam:          Upper extremity                         Delt     Bicep     Tricep    HG                                                 R         5/5 5/5 5/5 5/5                                               L          5/5 5/5 5/5 5/5          Lower extremity                        HF         KF        KE       DF         PF                                                  R        5/5 5/5 5/5 5/5 5/5                                               L         5/5 5/5 5/5 5/5 5/5                                                 Sensation: [X ] intact to light touch     Reflexes: Deep Tendon Reflexes Intact     Pulmonary: Clear to Auscultation, No rales, No rhonchi, No wheezes     Cardiovascular: S1, S2, Regular rate and rhythm     Gastrointestinal: Soft, Non-tender, Non-distended     Extremities: No calf tenderness     Incision: dressing clean and dry SUMMARY:HPI:  43yo  male presents to [facility] for a Superficial Temporal Artery- Middle Cerebral Artery bypass on 4/2/2019. PMH: HTN. Pt initially presented to urgent care on 3/13/2019 c/o HA which later developed into intractable N/V, he was diagnosed w/ the Flu and was sent home on Tamiflu. HA/nausea/vomiting persisted, so patient went to [facility] ED where CT/CTA was done showing IPH in L ventral parietal lobe rupturing into subarachnoid space (no residual), CTA head w/ findings concerning for Flores Flores. Transferred to [facility] on 3/16/2019 for further workup. 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 Pt with recent SOB and tachycardia. CT done to r/o PE neg       GCS:15    Allergies    No Known Allergies    Intolerances-none      REVIEW OF SYSTEMS:    [X All ROS addressed below are non-contributory,   Neuro: [ ] Headache [ ] Back pain [ ] Numbness [ ] Weakness [ ] Ataxia [ ] Dizziness [ ] Aphasia [ ] Dysarthria [ ] Visual disturbance  Resp: [ ] Shortness of breath/dyspnea, [ ] Orthopnea [ ] Cough  CV: [ ] Chest pain [ ] Palpitation [ ] Lightheadedness [ ] Syncope  Renal: [ ] Thirst [ ] Edema  GI: [ ] Nausea [ ] Emesis [ ] Abdominal pain [ ] Constipation [ ] Diarrhea  Hem: [ ] Hematemesis [ ] bright red blood per rectum  ID: [ ] Fever [ ] Chills [ ] Dysuria  ENT: [ ] Rhinorrhea    DEVICES:        VITALS: [X ] Reviewed  Vital Signs Last 24 Hrs  T(C): 36.8 (03 Apr 2019 07:00), Max: 36.9 (02 Apr 2019 18:50)  T(F): 98.2 (03 Apr 2019 07:00), Max: 98.4 (02 Apr 2019 18:50)  HR: 108 (03 Apr 2019 08:00) (96 - 110)  BP: 116/68 (02 Apr 2019 18:30) (98/59 - 116/68)  BP(mean): 85 (02 Apr 2019 18:00) (72 - 85)  RR: 15 (03 Apr 2019 08:00) (11 - 16)  SpO2: 97% (03 Apr 2019 08:00) (97% - 100%)  CAPILLARY BLOOD GLUCOSE            LABS:    04-02    143  |  108  |  13  ----------------------------<  138<H>  4.2   |  23  |  0.99    Ca    8.7      02 Apr 2019 18:08  Phos  3.6     04-02  Mg     2.3     04-02                            11.8   8.6   )-----------( 228      ( 02 Apr 2019 18:08 )             33.9       STROKE CORE MEASURES:   Hemoglobin A1C, Whole Blood: 4.6 % (03-17 @ 08:50)     MEDICATION LEVELS:     IMAGING/DATA: [X ] Reviewed    IVF FLUIDS/MEDICATIONS: [ ] Reviewed  MEDICATIONS  (STANDING):  amLODIPine   Tablet 10 milliGRAM(s) Oral daily  aspirin  chewable 81 milliGRAM(s) Oral daily  dexamethasone  Injectable 4 milliGRAM(s) IV Push every 6 hours  docusate sodium 100 milliGRAM(s) Oral three times a day  levETIRAcetam 500 milliGRAM(s) Oral every 12 hours  losartan 100 milliGRAM(s) Oral daily  minocycline 200 milliGRAM(s) Oral two times a day  pantoprazole    Tablet 40 milliGRAM(s) Oral before breakfast  senna 2 Tablet(s) Oral at bedtime    MEDICATIONS  (PRN):  ondansetron Injectable 4 milliGRAM(s) IV Push every 6 hours PRN Nausea and/or Vomiting  oxyCODONE    IR 5 milliGRAM(s) Oral every 4 hours PRN Moderate Pain (4 - 6)  oxyCODONE    IR 10 milliGRAM(s) Oral every 4 hours PRN Severe Pain (7 - 10)    I&O's Summary    02 Apr 2019 07:01  -  03 Apr 2019 07:00  --------------------------------------------------------  IN: 1030 mL / OUT: 1720 mL / NET: -690 mL        EXAMINATION:  PHYSICAL EXAM:    Constitutional: No Acute Distress     Neurological: Awake, alert oriented to person, place and time, Following Commands, PERRL, EOMI, No Gaze Preference, Face Symmetrical, Speech Fluent, No dysmetria, No ataxia, No nystagmus     Motor exam:          Upper extremity                         Delt     Bicep     Tricep    HG                                                 R         5/5 5/5 5/5 5/5                                               L          5/5 5/5 5/5 5/5          Lower extremity                        HF         KF        KE       DF         PF                                                  R        5/5 5/5 5/5 5/5 5/5                                               L         5/5 5/5 5/5 5/5 5/5                                                 Sensation: [X ] intact to light touch     Reflexes: Deep Tendon Reflexes Intact     Pulmonary: Clear to Auscultation, No rales, No rhonchi, No wheezes     Cardiovascular: S1, S2, Regular rate and rhythm     Gastrointestinal: Soft, Non-tender, Non-distended     Extremities: No calf tenderness     Incision: dressing clean and dry SUMMARY:HPI:  45yo  male presents to [facility] for a Superficial Temporal Artery- Middle Cerebral Artery bypass on 4/2/2019. PMH: HTN. Pt initially presented to urgent care on 3/13/2019 c/o HA which later developed into intractable N/V, he was diagnosed w/ the Flu and was sent home on Tamiflu. HA/nausea/vomiting persisted, so patient went to [facility] ED where CT/CTA was done showing IPH in L ventral parietal lobe rupturing into subarachnoid space (no residual), CTA head w/ findings concerning for Flores Flores. Transferred to [facility] on 3/16/2019 for further workup. 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 Pt with recent SOB and tachycardia. CT done to r/o PE neg       GCS:15    Allergies    No Known Allergies    Intolerances-none      REVIEW OF SYSTEMS:    [X All ROS addressed below are non-contributory,   Neuro: [ ] Headache [ ] Back pain [ ] Numbness [ ] Weakness [ ] Ataxia [ ] Dizziness [ ] Aphasia [ ] Dysarthria [ ] Visual disturbance  Resp: [ ] Shortness of breath/dyspnea, [ ] Orthopnea [ ] Cough  CV: [ ] Chest pain [ ] Palpitation [ ] Lightheadedness [ ] Syncope  Renal: [ ] Thirst [ ] Edema  GI: [ ] Nausea [ ] Emesis [ ] Abdominal pain [ ] Constipation [ ] Diarrhea  Hem: [ ] Hematemesis [ ] bright red blood per rectum  ID: [ ] Fever [ ] Chills [ ] Dysuria  ENT: [ ] Rhinorrhea    DEVICES:        VITALS: [X ] Reviewed  Vital Signs Last 24 Hrs  T(C): 36.8 (03 Apr 2019 07:00), Max: 36.9 (02 Apr 2019 18:50)  T(F): 98.2 (03 Apr 2019 07:00), Max: 98.4 (02 Apr 2019 18:50)  HR: 108 (03 Apr 2019 08:00) (96 - 110)  BP: 116/68 (02 Apr 2019 18:30) (98/59 - 116/68)  BP(mean): 85 (02 Apr 2019 18:00) (72 - 85)  RR: 15 (03 Apr 2019 08:00) (11 - 16)  SpO2: 97% (03 Apr 2019 08:00) (97% - 100%)  CAPILLARY BLOOD GLUCOSE            LABS:    04-02    143  |  108  |  13  ----------------------------<  138<H>  4.2   |  23  |  0.99    Ca    8.7      02 Apr 2019 18:08  Phos  3.6     04-02  Mg     2.3     04-02                            11.8   8.6   )-----------( 228      ( 02 Apr 2019 18:08 )             33.9       STROKE CORE MEASURES:   Hemoglobin A1C, Whole Blood: 4.6 % (03-17 @ 08:50)     MEDICATION LEVELS:     IMAGING/DATA: [X ] Reviewed; bypass intact ; small L subgaleal collection    IVF FLUIDS/MEDICATIONS: [ ] Reviewed  MEDICATIONS  (STANDING):  amLODIPine   Tablet 10 milliGRAM(s) Oral daily  aspirin  chewable 81 milliGRAM(s) Oral daily  dexamethasone  Injectable 4 milliGRAM(s) IV Push every 6 hours  docusate sodium 100 milliGRAM(s) Oral three times a day  levETIRAcetam 500 milliGRAM(s) Oral every 12 hours  losartan 100 milliGRAM(s) Oral daily  minocycline 200 milliGRAM(s) Oral two times a day  pantoprazole    Tablet 40 milliGRAM(s) Oral before breakfast  senna 2 Tablet(s) Oral at bedtime    MEDICATIONS  (PRN):  ondansetron Injectable 4 milliGRAM(s) IV Push every 6 hours PRN Nausea and/or Vomiting  oxyCODONE    IR 5 milliGRAM(s) Oral every 4 hours PRN Moderate Pain (4 - 6)  oxyCODONE    IR 10 milliGRAM(s) Oral every 4 hours PRN Severe Pain (7 - 10)    I&O's Summary    02 Apr 2019 07:01  -  03 Apr 2019 07:00  --------------------------------------------------------  IN: 1030 mL / OUT: 1720 mL / NET: -690 mL        EXAMINATION:  PHYSICAL EXAM:    Constitutional: No Acute Distress     Neurological: Awake, alert oriented to person, place and time, Following Commands, PERRL, EOMI, No Gaze Preference, Face Symmetrical, Speech Fluent, No dysmetria, No ataxia, No nystagmus     Motor exam:          Upper extremity                         Delt     Bicep     Tricep    HG                                                 R         5/5 5/5 5/5 5/5                                               L          5/5 5/5 5/5 5/5          Lower extremity                        HF         KF        KE       DF         PF                                                  R        5/5 5/5 5/5 5/5 5/5                                               L         5/5 5/5 5/5 5/5 5/5                                                 Sensation: [X ] intact to light touch     Reflexes: Deep Tendon Reflexes Intact     Pulmonary: Clear to Auscultation, No rales, No rhonchi, No wheezes     Cardiovascular: S1, S2, Regular rate and rhythm     Gastrointestinal: Soft, Non-tender, Non-distended     Extremities: No calf tenderness     Incision: dressing clean and dry

## 2019-04-03 NOTE — PHYSICAL THERAPY INITIAL EVALUATION ADULT - GENERAL OBSERVATIONS, REHAB EVAL
Pt received supine in bed, A&Ox4, +tele, +IVlock, +bpcuff, +pulseox, agreeable to session, german 30 min.

## 2019-04-03 NOTE — PHYSICAL THERAPY INITIAL EVALUATION ADULT - PRECAUTIONS/LIMITATIONS, REHAB EVAL
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 Pt with recent SOB and tachycardia. CTA negative to r/o PE./fall precautions

## 2019-04-03 NOTE — PHYSICAL THERAPY INITIAL EVALUATION ADULT - ADDITIONAL COMMENTS
4/03/19 Brain CT: Interval left pterional craniotomy and left anterior temporal craniectomy. Extra-axial collection subjacent to the craniectomy measures 7 mm in greatest depth. Small acute left frontal subarachnoid hemorrhage.No midline shift or hydrocephalus. Interval resolution of small acute hemorrhage seen in the region of the posterior limb of the left internal capsule.    Brain CTA: Interval left superficial temporal artery to left middle cerebral artery   bypass.Redemonstration of lack of flow related enhancement of the left M1 segment. There is enhancement of more distal left MCA branches.

## 2019-04-03 NOTE — DISCHARGE NOTE NURSING/CASE MANAGEMENT/SOCIAL WORK - NSDCPEPTSTRK_GEN_ALL_CORE
Signs and symptoms of stroke/Prescribed medications/Stroke support groups for patients, families, and friends/Need for follow up after discharge/Call 911 for stroke/Stroke education booklet/Stroke warning signs and symptoms/Risk factors for stroke

## 2019-04-03 NOTE — PHYSICAL THERAPY INITIAL EVALUATION ADULT - REFERRING PHYSICIAN, REHAB EVAL
Denae Campbell. Consult- PT evaluate and treat, Activity- increase as tolerated, Activity- OOB with assist Denae Campbell

## 2019-04-03 NOTE — PROGRESS NOTE ADULT - SUBJECTIVE AND OBJECTIVE BOX
Vital Signs Last 24 Hrs  T(C): 36.8 (02 Apr 2019 23:00), Max: 36.9 (02 Apr 2019 06:59)  T(F): 98.3 (02 Apr 2019 23:00), Max: 98.4 (02 Apr 2019 06:59)  HR: 103 (03 Apr 2019 00:00) (84 - 110)  BP: 116/68 (02 Apr 2019 18:30) (98/59 - 120/78)  BP(mean): 85 (02 Apr 2019 18:00) (72 - 85)  RR: 13 (03 Apr 2019 00:00) (11 - 20)  SpO2: 99% (03 Apr 2019 00:00) (97% - 100%)    EXAM  alert, fully oriented, PERRL, EOMI, FS  BUE no drift 5/5  BLE 5/5

## 2019-04-03 NOTE — CHART NOTE - NSCHARTNOTEFT_GEN_A_CORE
CAPRINI SCORE [CLOT] Score on Admission for     AGE RELATED RISK FACTORS                                                       MOBILITY RELATED FACTORS  [x ] Age 41-60 years                                            (1 Point)                  [ ] Bed rest                                                        (1 Point)  [ ] Age: 61-74 years                                           (2 Points)                [ ] Plaster cast                                                   (2 Points)  [ ] Age= 75 years                                              (3 Points)                  [ ] Bed bound for more than 72 hours         (2 Points)    DISEASE RELATED RISK FACTORS                                               GENDER SPECIFIC FACTORS  [ ] Edema in the lower extremities                       (1 Point)           [ ] Pregnancy                                                          (1 Point)  [ ] Varicose veins                                               (1 Point)                  [ ] Post-partum < 6 weeks                                   (1 Point)             [x ] BMI > 25 Kg/m2                                            (1 Point)                  [ ] Hormonal therapy  or oral contraception   (1 Point)                 [ ] Sepsis (in the previous month)                        (1 Point)            [ ] History of pregnancy complications             (1 point)  [ ] Pneumonia or serious lung disease                                            [ ] Unexplained or recurrent               (1 Point)           (in the previous month)                               (1 Point)  [ ] Abnormal pulmonary function test                     (1 Point)                 SURGERY RELATED RISK FACTORS (include planned surgeries)  [ ] Acute myocardial infarction                              (1 Point)                 [ ]  Section                                             (1 Point)  [ ] Congestive heart failure (in the previous month)  (1 Point)        [ ] Minor surgery                                                  (1 Point)   [ ] Inflammatory bowel disease                             (1 Point)                 [ ] Arthroscopic surgery                                        (2 Points)  [ ] Central venous access                                      (2 Points)  [ ] History / presence of malignancy                   (2 points)   [ ] General surgery lasting more than 45 minutes   (2 Points)       [ ] Stroke (in the previous month)                          (5 Points)               [ ] Elective arthroplasty                                         (5 Points)                                                                                                                                               HEMATOLOGY RELATED FACTORS                                                 TRAUMA RELATED RISK FACTORS  [ ] Prior episodes of VTE                                     (3 Points)                [ ] Fracture of the hip, pelvis, or leg                       (5 Points)  [ ] Positive family history for VTE                         (3 Points)             [ ] Acute spinal cord injury (in the previous month)  (5 Points)  [ ] Prothrombin 05305 A                                     (3 Points)                [ ] Paralysis  (less than 1 month)                             (5 Points)  [ ] Factor V Leiden                                             (3 Points)                   [ ] Multiple Trauma within 1 month                        (5 Points)  [ ] Lupus anticoagulants                                     (3 Points)                                                           [ ] Anticardiolipin antibodies                               (3 Points)                                                       [ ] High homocysteine in the blood                      (3 Points)                                             [ ] Other congenital or acquired thrombophilia      (3 Points)                                                [ ] Heparin induced thrombocytopenia                  (3 Points)                                          Total Score [    2      ]    Risk:  Very low 0   Low 1 to 2   Moderate 3 to 4   High =5       VTE Prophylaxis Recommendations:  [x ] mechanical pneumatic compression devices                                      [ ] contraindicated: _____________________  [ ] chemoprophylaxis                                                                                    [x ] contraindicated _______bleeding risk______________    **** HIGH LIKELIHOOD DVT PRESENT ON ADMISSION  [ ] (please order LE dopplers within 24 hours of admission)

## 2019-04-04 ENCOUNTER — TRANSCRIPTION ENCOUNTER (OUTPATIENT)
Age: 44
End: 2019-04-04

## 2019-04-04 VITALS
RESPIRATION RATE: 17 BRPM | SYSTOLIC BLOOD PRESSURE: 108 MMHG | HEART RATE: 84 BPM | DIASTOLIC BLOOD PRESSURE: 72 MMHG | TEMPERATURE: 98 F | OXYGEN SATURATION: 97 %

## 2019-04-04 PROCEDURE — 80048 BASIC METABOLIC PNL TOTAL CA: CPT

## 2019-04-04 PROCEDURE — 84100 ASSAY OF PHOSPHORUS: CPT

## 2019-04-04 PROCEDURE — 85027 COMPLETE CBC AUTOMATED: CPT

## 2019-04-04 PROCEDURE — 82803 BLOOD GASES ANY COMBINATION: CPT

## 2019-04-04 PROCEDURE — 83605 ASSAY OF LACTIC ACID: CPT

## 2019-04-04 PROCEDURE — 97116 GAIT TRAINING THERAPY: CPT

## 2019-04-04 PROCEDURE — C1889: CPT

## 2019-04-04 PROCEDURE — 82565 ASSAY OF CREATININE: CPT

## 2019-04-04 PROCEDURE — 70450 CT HEAD/BRAIN W/O DYE: CPT

## 2019-04-04 PROCEDURE — 82330 ASSAY OF CALCIUM: CPT

## 2019-04-04 PROCEDURE — 83735 ASSAY OF MAGNESIUM: CPT

## 2019-04-04 PROCEDURE — C1713: CPT

## 2019-04-04 PROCEDURE — 86900 BLOOD TYPING SEROLOGIC ABO: CPT

## 2019-04-04 PROCEDURE — 84295 ASSAY OF SERUM SODIUM: CPT

## 2019-04-04 PROCEDURE — 86850 RBC ANTIBODY SCREEN: CPT

## 2019-04-04 PROCEDURE — 70450 CT HEAD/BRAIN W/O DYE: CPT | Mod: 26

## 2019-04-04 PROCEDURE — 85014 HEMATOCRIT: CPT

## 2019-04-04 PROCEDURE — 97162 PT EVAL MOD COMPLEX 30 MIN: CPT

## 2019-04-04 PROCEDURE — 97165 OT EVAL LOW COMPLEX 30 MIN: CPT

## 2019-04-04 PROCEDURE — 84132 ASSAY OF SERUM POTASSIUM: CPT

## 2019-04-04 PROCEDURE — 82947 ASSAY GLUCOSE BLOOD QUANT: CPT

## 2019-04-04 PROCEDURE — 70496 CT ANGIOGRAPHY HEAD: CPT

## 2019-04-04 PROCEDURE — 86901 BLOOD TYPING SEROLOGIC RH(D): CPT

## 2019-04-04 PROCEDURE — 82435 ASSAY OF BLOOD CHLORIDE: CPT

## 2019-04-04 RX ORDER — MINOCYCLINE HYDROCHLORIDE 45 MG/1
2 TABLET, EXTENDED RELEASE ORAL
Qty: 12 | Refills: 0
Start: 2019-04-04

## 2019-04-04 RX ORDER — ACETAMINOPHEN 500 MG
650 TABLET ORAL EVERY 6 HOURS
Qty: 0 | Refills: 0 | Status: DISCONTINUED | OUTPATIENT
Start: 2019-04-04 | End: 2019-04-04

## 2019-04-04 RX ORDER — DOCUSATE SODIUM 100 MG
1 CAPSULE ORAL
Qty: 0 | Refills: 0 | DISCHARGE
Start: 2019-04-04

## 2019-04-04 RX ORDER — SENNA PLUS 8.6 MG/1
2 TABLET ORAL
Qty: 0 | Refills: 0 | DISCHARGE
Start: 2019-04-04

## 2019-04-04 RX ORDER — ACETAMINOPHEN 500 MG
2 TABLET ORAL
Qty: 0 | Refills: 0 | DISCHARGE
Start: 2019-04-04

## 2019-04-04 RX ORDER — ASPIRIN/CALCIUM CARB/MAGNESIUM 324 MG
1 TABLET ORAL
Qty: 0 | Refills: 0 | DISCHARGE
Start: 2019-04-04

## 2019-04-04 RX ORDER — OXYCODONE HYDROCHLORIDE 5 MG/1
1 TABLET ORAL
Qty: 20 | Refills: 0
Start: 2019-04-04

## 2019-04-04 RX ADMIN — PANTOPRAZOLE SODIUM 40 MILLIGRAM(S): 20 TABLET, DELAYED RELEASE ORAL at 05:32

## 2019-04-04 RX ADMIN — LOSARTAN POTASSIUM 100 MILLIGRAM(S): 100 TABLET, FILM COATED ORAL at 05:32

## 2019-04-04 RX ADMIN — MINOCYCLINE HYDROCHLORIDE 200 MILLIGRAM(S): 45 TABLET, EXTENDED RELEASE ORAL at 17:25

## 2019-04-04 RX ADMIN — Medication 81 MILLIGRAM(S): at 12:21

## 2019-04-04 RX ADMIN — Medication 650 MILLIGRAM(S): at 10:10

## 2019-04-04 RX ADMIN — LEVETIRACETAM 500 MILLIGRAM(S): 250 TABLET, FILM COATED ORAL at 17:25

## 2019-04-04 RX ADMIN — Medication 100 MILLIGRAM(S): at 05:32

## 2019-04-04 RX ADMIN — MINOCYCLINE HYDROCHLORIDE 200 MILLIGRAM(S): 45 TABLET, EXTENDED RELEASE ORAL at 05:32

## 2019-04-04 RX ADMIN — LEVETIRACETAM 500 MILLIGRAM(S): 250 TABLET, FILM COATED ORAL at 05:32

## 2019-04-04 RX ADMIN — Medication 650 MILLIGRAM(S): at 10:40

## 2019-04-04 RX ADMIN — AMLODIPINE BESYLATE 10 MILLIGRAM(S): 2.5 TABLET ORAL at 05:32

## 2019-04-04 RX ADMIN — Medication 100 MILLIGRAM(S): at 12:21

## 2019-04-04 NOTE — CONSULT NOTE ADULT - ASSESSMENT
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

## 2019-04-04 NOTE — DISCHARGE NOTE PROVIDER - CARE PROVIDERS DIRECT ADDRESSES
,[email or web address]_Unknown

## 2019-04-04 NOTE — DISCHARGE NOTE PROVIDER - NSDCACTIVITY_GEN_ALL_CORE
Do not make important decisions/Walking - Indoors allowed/No heavy lifting/straining/Bathing allowed/Showering allowed/Do not drive or operate machinery/Walking - Outdoors allowed/Stairs allowed

## 2019-04-04 NOTE — CONSULT NOTE ADULT - SUBJECTIVE AND OBJECTIVE BOX
CHIEF COMPLAINT:  Flores Flores / brain bypass surgery     HISTORY OF PRESENT ILLNESS:  45 yo male well known to me from office presented to [facility] for a Superficial Temporal Artery- Middle Cerebral Artery bypass on 4/2/2019. PMH: HTN. Pt initially presented to urgent care on 3/13/2019 c/o HA which later developed into intractable N/V, he was diagnosed w/ the Flu and was sent home on Tamiflu. HA/nausea/vomiting persisted, so patient went to [facility] ED where CT/CTA was done showing IPH in L ventral parietal lobe rupturing into subarachnoid space (no residual), CTA head w/ findings concerning for Flores Flores. Transferred to [facility] on 3/16/2019 for further workup. 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 Pt with recent SOB and tachycardia. CT done to r/o PE.  he underwent successful left-sided STA/MCA bypass. he is now POD #2.   States to feel ok. Denies chest pain, shortness of breath or palpitations.       PAST MEDICAL & SURGICAL HISTORY:  History of spontaneous intraparenchymal intracranial hemorrhage  Moyamoya disease  HTN (hypertension)  No significant past surgical history          MEDICATIONS:  amLODIPine   Tablet 10 milliGRAM(s) Oral daily  aspirin  chewable 81 milliGRAM(s) Oral daily  enoxaparin Injectable 40 milliGRAM(s) SubCutaneous at bedtime  losartan 100 milliGRAM(s) Oral daily    minocycline 200 milliGRAM(s) Oral two times a day      acetaminophen   Tablet .. 650 milliGRAM(s) Oral every 6 hours PRN  levETIRAcetam 500 milliGRAM(s) Oral every 12 hours  ondansetron Injectable 4 milliGRAM(s) IV Push every 6 hours PRN  oxyCODONE    IR 5 milliGRAM(s) Oral every 4 hours PRN  oxyCODONE    IR 10 milliGRAM(s) Oral every 4 hours PRN    docusate sodium 100 milliGRAM(s) Oral three times a day  pantoprazole    Tablet 40 milliGRAM(s) Oral before breakfast  senna 2 Tablet(s) Oral at bedtime          FAMILY HISTORY:  Family history of coronary artery disease: Mother w/ stents  Family history of atrial fibrillation: Father  Family history of hypertension: Father/Mother      SOCIAL HISTORY:    [ ] Non-smoker  [ ] Smoker  [ ] Alcohol    Allergies    No Known Allergies    Intolerances    	    REVIEW OF SYSTEMS:  CONSTITUTIONAL: No fever, weight loss, +fatigue  EYES: + eye pain, visual disturbances, or discharge  ENMT:  No difficulty hearing, tinnitus, vertigo; No sinus or throat pain  NECK: No pain or stiffness  RESPIRATORY: No cough, wheezing, chills or hemoptysis; No Shortness of Breath  CARDIOVASCULAR: No chest pain, palpitations, passing out, dizziness, or leg swelling  GASTROINTESTINAL: No abdominal or epigastric pain. No nausea, vomiting, or hematemesis; No diarrhea or constipation. No melena or hematochezia.  GENITOURINARY: No dysuria, frequency, hematuria, or incontinence  NEUROLOGICAL: No headaches, memory loss, loss of strength, numbness, or tremors  SKIN: No itching, burning, rashes, or lesions   LYMPH Nodes: No enlarged glands  ENDOCRINE: No heat or cold intolerance; No hair loss  MUSCULOSKELETAL: + joint pain or swelling; No muscle, back, or extremity pain  PSYCHIATRIC: No depression, anxiety, mood swings, or difficulty sleeping  HEME/LYMPH: No easy bruising, or bleeding gums  ALLERY AND IMMUNOLOGIC: No hives or eczema	    [ ] All others negative	  [ ] Unable to obtain    PHYSICAL EXAM:  T(C): 36.5 (04-04-19 @ 12:32), Max: 36.9 (04-03-19 @ 23:00)  HR: 80 (04-04-19 @ 12:32) (68 - 114)  BP: 117/67 (04-04-19 @ 12:32) (113/69 - 129/80)  RR: 18 (04-04-19 @ 12:32) (12 - 19)  SpO2: 98% (04-04-19 @ 12:32) (94% - 100%)  Wt(kg): --  I&O's Summary    03 Apr 2019 07:01  -  04 Apr 2019 07:00  --------------------------------------------------------  IN: 920 mL / OUT: 1725 mL / NET: -805 mL        Appearance: NAD L sided craniotomy 	  HEENT:   Normal oral mucosa, L eye dao-orbital ecchymosis 	  Lymphatic: No lymphadenopathy  Cardiovascular: Normal S1 S2, No JVD, No murmurs, No edema  Respiratory: Lungs clear to auscultation	  Psychiatry: A & O x 3, Mood & affect appropriate  Gastrointestinal:  Soft, Non-tender, + BS	  Skin: No rashes, No ecchymoses, No cyanosis	  Neurologic: Non-focal  Extremities: Normal range of motion, No clubbing, cyanosis or edema  Vascular: Peripheral pulses palpable 2+ bilaterally    TELEMETRY: 	    ECG:  	  RADIOLOGY:  < from: CT Head No Cont (04.04.19 @ 08:57) >    EXAM:  CT BRAIN                            PROCEDURE DATE:  04/04/2019            INTERPRETATION:  Noncontrast CT of the brain.    CLINICAL INDICATION:  s/p bypass    TECHNIQUE : Axial CT scanning of the brain was obtained from the skull   base to the vertex without the administration of intravenous contrast.      COMPARISON: CT brain 3/17/2019    FINDINGS:    Redemonstration of left pterional craniotomy and temporal craniectomy.   Extra-axial hemorrhage subjacent to the craniotomy again measures 6 mm in   greatest depth. Small left frontal subarachnoid hemorrhage is similar.    No midline shift, effacement of the basal cisterns, or hydrocephalus. No   parenchymal hemorrhage or CT evidence of acute territorial infarct.    IMPRESSION:    Redemonstration of left pterional craniotomy and temporal craniectomy.   Extra-axial hemorrhage subjacent to the craniotomy again measures 6 mm in   greatest depth. Small left frontal subarachnoid hemorrhage is similar.    No midline shift, effacement ofthe basal cisterns, or hydrocephalus.     No parenchymal hemorrhage or CT evidence of acute territorial infarct.                        ARIANA MALDONADO M.D., ATTENDING RADIOLOGIST  This document has been electronically signed. Apr 4 2019  9:36AM              < end of copied text >    OTHER: 	  	  LABS:	 	    CARDIAC MARKERS:                                  11.8   8.6   )-----------( 228      ( 02 Apr 2019 18:08 )             33.9     04-02    143  |  108  |  13  ----------------------------<  138<H>  4.2   |  23  |  0.99    Ca    8.7      02 Apr 2019 18:08  Phos  3.6     04-02  Mg     2.3     04-02      proBNP:   Lipid Profile:   HgA1c:   TSH:

## 2019-04-04 NOTE — DISCHARGE NOTE PROVIDER - NSDCFUADDINST_GEN_ALL_CORE_FT
please notify physician if fevers, bleeding, swelling, pain not relieved by medication, increased sluggishness or irritability, increased nausea or vomiting, inability to tolerate foods or liquids.   please do not engage in strenuous activity, heavy lifting, drive, or return to work or school until cleared by surgeon.   please keep incision clean and dry, do not submerge wound in water for prolonged periods of time, pat dry after showering, and do not use any creams or ointments to incision.   Please call Dr Badillo office for appointment in 1-2 weeks. suture/staple removal at that time.   Do not drive or return to school/work until cleared by surgeon

## 2019-04-04 NOTE — OCCUPATIONAL THERAPY INITIAL EVALUATION ADULT - PERTINENT HX OF CURRENT PROBLEM, REHAB EVAL
45yo  male presents to [facility] for a Superficial Temporal Artery- Middle Cerebral Artery bypass on 4/2/2019. PMH: HTN. Pt initially presented to urgent care on 3/13/2019 c/o HA which later developed into intractable N/V, he was diagnosed w/ the Flu and was sent home on Tamiflu. HA/nausea/vomiting persisted, so patient went to [facility] ED where CT/CTA was done showing IPH in L ventral parietal lobe rupturing into subarachnoid space (no residual), CTA head w/ findings concerning for Flores Floers.

## 2019-04-04 NOTE — DISCHARGE NOTE PROVIDER - CARE PROVIDER_API CALL
Brien Badillo)  Neurological Surgery  [address] [place] [address]  Phone: [phone]  Fax: [phone]  Follow Up Time:     Gerardo Iniguez)  Neurology; Vascular Neurology  [address], [address]  [place] [address]  Phone: [phone]  Fax: [phone]  Follow Up Time:     Jed Quintana)  Cardiology; Internal Medicine  [address] [facility] [facility], [address]  [place] [address]  Phone: [phone]  Fax: [phone]  Follow Up Time:

## 2019-04-04 NOTE — OCCUPATIONAL THERAPY INITIAL EVALUATION ADULT - ADDITIONAL COMMENTS
Transferred to [facility] on 3/16/2019 for further workup. 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 Pt with recent SOB and tachycardia. CT done to r/o PE. Cardiologist seen earlier today for workup/evaluation and neurologist to be seen tomorrow.CTH: Redemonstration of left pterional craniotomy and temporal craniectomy. Extra-axial hemorrhage subjacent to the craniotomy again measures 6 mm in greatest depth. Small left frontal subarachnoid hemorrhage is similar.No midline shift, effacement of the basal cisterns, or hydrocephalus. No parenchymal hemorrhage or CT evidence of acute territorial infarct. Brain CTA:Interval left superficial temporal artery to left middle cerebral artery bypass.

## 2019-04-04 NOTE — DISCHARGE NOTE PROVIDER - HOSPITAL COURSE
45yo  male presents to [facility] for a Superficial Temporal Artery- Middle Cerebral Artery bypass on 4/2/2019. PMH: HTN. Pt initially presented to urgent care on 3/13/2019 c/o HA which later developed into intractable N/V, he was diagnosed w/ the Flu and was sent home on Tamiflu. HA/nausea/vomiting persisted, so patient went to [facility] ED where CT/CTA was done showing IPH in L ventral parietal lobe rupturing into subarachnoid space (no residual), CTA head w/ findings concerning for Flores Flores. Transferred to [facility] on 3/16/2019 for further workup. 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 Pt with recent SOB and tachycardia. CT done to r/o PE. Cardiologist seen earlier today for workup/evaluation and neurologist to be seen tomorrow. Pt currently denies N&V, HA, chest pain or SOB and feels well otherwise. (27 Mar 2019 16:44)        Patient admitted and on 4/2/19 s/p elective left craniotomy and STA-MCA bypass for Flores Flores disease. Post op imaging completed. He was cared for per protocol in the NSCU and transferred to the floor 4/3/19.     PT evaluated him and recommended no skilled needs. On 4/4/19 he was medically and neurologically stable for discharge to home.

## 2019-04-04 NOTE — DISCHARGE NOTE PROVIDER - PROVIDER TOKENS
PROVIDER:[TOKEN:[[id number]]],PROVIDER:[TOKEN:[[id number]]],PROVIDER:[TOKEN:[[id number]]]

## 2019-04-04 NOTE — DISCHARGE NOTE PROVIDER - NSDCCPCAREPLAN_GEN_ALL_CORE_FT
PRINCIPAL DISCHARGE DIAGNOSIS  Diagnosis: Moyamoya disease  Assessment and Plan of Treatment: 4/2/19 s/p elective left craniotomy and STA-MCA bypass for Flores Flores disease.   Dr Badillo- neurosurgeon- please call office for appointment in 1-2 weeks.   Primary Care physician and Dr Iniguez- stroke neurologist within 1-2 weeks.      SECONDARY DISCHARGE DIAGNOSES  Diagnosis: HTN (hypertension)  Assessment and Plan of Treatment: Please continue medications and follow up with your primary care physician and/or cardiologist Dr Quintana within 1-2 weeks.

## 2019-04-04 NOTE — PROGRESS NOTE ADULT - SUBJECTIVE AND OBJECTIVE BOX
CHIEF COMPLAINT: patient c/o swollen left eye, anxious to go home    Vital Signs Last 24 Hrs  T(C): 36.6 (04 Apr 2019 16:08), Max: 36.9 (03 Apr 2019 23:00)  T(F): 97.9 (04 Apr 2019 16:08), Max: 98.5 (03 Apr 2019 23:00)  HR: 84 (04 Apr 2019 16:08) (68 - 107)  BP: 108/72 (04 Apr 2019 16:08) (108/72 - 129/80)  BP(mean): 91 (03 Apr 2019 19:00) (91 - 91)  RR: 17 (04 Apr 2019 16:08) (15 - 19)  SpO2: 97% (04 Apr 2019 16:08) (95% - 100%)    PHYSICAL EXAM:    General: No Acute Distress     Neurological: Awake, alert oriented to person, place and time, Following Commands, PERRL, EOMI, Face Symmetrical, Speech Fluent, Moving all extremities, Muscle Strength normal in all four extremities, No Drift,   Sensation to Light Touch Intact; left periorbital edema/ecchymosis    Pulmonary: Clear to Auscultation, No Rales, No Rhonchi, No Wheezes     Cardiovascular: S1, S2, Regular Rate and Rhythm     Gastrointestinal: Soft, Nontender, Nondistended     Incision: +sutures c/d/i    LABS:         Hemoglobin A1C, Whole Blood: 4.6 % (03-17 @ 08:50)      04-03 @ 07:01  -  04-04 @ 07:00  --------------------------------------------------------  IN: 920 mL / OUT: 1725 mL / NET: -805 mL        MEDICATIONS:  Anticoagulation:  aspirin  chewable 81 milliGRAM(s) Oral daily  enoxaparin Injectable 40 milliGRAM(s) SubCutaneous at bedtime    Antibiotics:  minocycline 200 milliGRAM(s) Oral two times a day    Endo:    Neuro:  acetaminophen   Tablet .. 650 milliGRAM(s) Oral every 6 hours PRN Temp greater or equal to 38C (100.4F), Mild Pain (1 - 3)  levETIRAcetam 500 milliGRAM(s) Oral every 12 hours  ondansetron Injectable 4 milliGRAM(s) IV Push every 6 hours PRN Nausea and/or Vomiting  oxyCODONE    IR 5 milliGRAM(s) Oral every 4 hours PRN Moderate Pain (4 - 6)  oxyCODONE    IR 10 milliGRAM(s) Oral every 4 hours PRN Severe Pain (7 - 10)    Cardiac:  amLODIPine   Tablet 10 milliGRAM(s) Oral daily  losartan 100 milliGRAM(s) Oral daily    Pulm:    GI/:  docusate sodium 100 milliGRAM(s) Oral three times a day  pantoprazole    Tablet 40 milliGRAM(s) Oral before breakfast  senna 2 Tablet(s) Oral at bedtime    Other:     DIET: [] Regular [] CCD [] Renal [] Puree [] Dysphagia [] Tube Feeds:     IMAGING:

## 2019-04-04 NOTE — PROGRESS NOTE ADULT - ASSESSMENT
43yo  male presents to [facility] for a Superficial Temporal Artery- Middle Cerebral Artery bypass on 4/2/2019. PMH: HTN. Pt initially presented to urgent care on 3/13/2019 c/o HA which later developed into intractable N/V, he was diagnosed w/ the Flu and was sent home on Tamiflu. HA/nausea/vomiting persisted, so patient went to [facility] ED where CT/CTA was done showing IPH in L ventral parietal lobe rupturing into subarachnoid space (no residual), CTA head w/ findings concerning for Flores Flores. Transferred to [facility] on 3/16/2019 for further workup. 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 Pt with recent SOB and tachycardia. CT done to r/o PE. Cardiologist seen earlier today for workup/evaluation and neurologist to be seen tomorrow. Pt currently denies N&V, HA, chest pain or SOB and feels well otherwise. (27 Mar 2019 16:44)    PAST MEDICAL & SURGICAL HISTORY:  History of spontaneous intraparenchymal intracranial hemorrhage  Moyamoya disease  HTN (hypertension)  No significant past surgical history    PROCEDURE: 4/2/19 s/p elective left craniotomy and STA-MCA bypass for Flores Flores disease.    PLAN:  Neuro: will d/c keppra given no seizures, pain meds PRN  Respiratory: patient instructed on incentive spirometer  CV: cont amlodipine for HTN               DVT ppx: [] SQH [x] SQL and venodynes bilaterally  Renal: IVL  ID: afebrile  GI: bowel regimen  PT/OT: no skilled PT needs  d/c IVL and d/c home today  discussed with Dr Badillo  Discussed with patient and family wound care, follow up plans, activity, and medications. Questions answered, and they verbalized understanding.   RXs sent to [facility] # [id number]    Assessment:  Please Check When Present   []  GCS  E   V  M     Heart Failure: []Acute, [] acute on chronic , []chronic  Heart Failure:  [] Diastolic (HFpEF), [] Systolic (HFrEF), []Combined (HFpEF and HFrEF), [] RHF, [] Pulm HTN, [] Other    [] JITENDRA, [] ATN, [] AIN, [] other  [] CKD1, [] CKD2, [] CKD 3, [] CKD 4, [] CKD 5, []ESRD    Encephalopathy: [] Metabolic, [] Hepatic, [] toxic, [] Neurological, [] Other    Abnormal Nutritional Status: [] malnutrition (see nutrition note), [ ]underweight: BMI < 19, [] morbid obesity: BMI >40, [] Cachexia    [] Sepsis  [] hypovolemic shock,[] cardiogenic shock, [] hemorrhagic shock, [] neurogenic shock  [] Acute Respiratory Failure  []Cerebral edema, [] Brain compression/ herniation,   [] Functional quadriplegia  [] Acute blood loss anemia

## 2019-04-10 ENCOUNTER — APPOINTMENT (OUTPATIENT)
Dept: NEUROSURGERY | Facility: CLINIC | Age: 44
End: 2019-04-10
Payer: COMMERCIAL

## 2019-04-10 ENCOUNTER — EMERGENCY (EMERGENCY)
Facility: HOSPITAL | Age: 44
LOS: 1 days | Discharge: HOME | End: 2019-04-10
Attending: EMERGENCY MEDICINE
Payer: COMMERCIAL

## 2019-04-10 ENCOUNTER — APPOINTMENT (OUTPATIENT)
Dept: NEUROLOGY | Facility: CLINIC | Age: 44
End: 2019-04-10
Payer: COMMERCIAL

## 2019-04-10 VITALS
HEART RATE: 100 BPM | BODY MASS INDEX: 29.78 KG/M2 | DIASTOLIC BLOOD PRESSURE: 73 MMHG | HEIGHT: 70 IN | SYSTOLIC BLOOD PRESSURE: 107 MMHG | WEIGHT: 208 LBS

## 2019-04-10 VITALS
OXYGEN SATURATION: 99 % | RESPIRATION RATE: 18 BRPM | HEIGHT: 70 IN | WEIGHT: 207.9 LBS | HEART RATE: 77 BPM | DIASTOLIC BLOOD PRESSURE: 67 MMHG | SYSTOLIC BLOOD PRESSURE: 98 MMHG | TEMPERATURE: 98 F

## 2019-04-10 VITALS
RESPIRATION RATE: 16 BRPM | TEMPERATURE: 98 F | HEART RATE: 81 BPM | DIASTOLIC BLOOD PRESSURE: 61 MMHG | OXYGEN SATURATION: 100 % | SYSTOLIC BLOOD PRESSURE: 93 MMHG

## 2019-04-10 VITALS
BODY MASS INDEX: 29.78 KG/M2 | OXYGEN SATURATION: 94 % | HEART RATE: 100 BPM | WEIGHT: 208 LBS | TEMPERATURE: 98.2 F | SYSTOLIC BLOOD PRESSURE: 106 MMHG | DIASTOLIC BLOOD PRESSURE: 69 MMHG | RESPIRATION RATE: 17 BRPM | HEIGHT: 70 IN

## 2019-04-10 LAB
ALBUMIN SERPL ELPH-MCNC: 4.1 G/DL — SIGNIFICANT CHANGE UP (ref 3.3–5)
ALP SERPL-CCNC: 82 U/L — SIGNIFICANT CHANGE UP (ref 40–120)
ALT FLD-CCNC: 49 U/L — HIGH (ref 10–45)
ANION GAP SERPL CALC-SCNC: 13 MMOL/L — SIGNIFICANT CHANGE UP (ref 5–17)
AST SERPL-CCNC: 21 U/L — SIGNIFICANT CHANGE UP (ref 10–40)
BASOPHILS # BLD AUTO: 0 K/UL — SIGNIFICANT CHANGE UP (ref 0–0.2)
BASOPHILS NFR BLD AUTO: 0.4 % — SIGNIFICANT CHANGE UP (ref 0–2)
BILIRUB SERPL-MCNC: 0.4 MG/DL — SIGNIFICANT CHANGE UP (ref 0.2–1.2)
BUN SERPL-MCNC: 26 MG/DL — HIGH (ref 7–23)
CALCIUM SERPL-MCNC: 9.1 MG/DL — SIGNIFICANT CHANGE UP (ref 8.4–10.5)
CHLORIDE SERPL-SCNC: 101 MMOL/L — SIGNIFICANT CHANGE UP (ref 96–108)
CO2 SERPL-SCNC: 24 MMOL/L — SIGNIFICANT CHANGE UP (ref 22–31)
CREAT SERPL-MCNC: 1.25 MG/DL — SIGNIFICANT CHANGE UP (ref 0.5–1.3)
EOSINOPHIL # BLD AUTO: 0.1 K/UL — SIGNIFICANT CHANGE UP (ref 0–0.5)
EOSINOPHIL NFR BLD AUTO: 1.1 % — SIGNIFICANT CHANGE UP (ref 0–6)
GLUCOSE SERPL-MCNC: 116 MG/DL — HIGH (ref 70–99)
HCT VFR BLD CALC: 32.8 % — LOW (ref 39–50)
HGB BLD-MCNC: 11.2 G/DL — LOW (ref 13–17)
LYMPHOCYTES # BLD AUTO: 2.5 K/UL — SIGNIFICANT CHANGE UP (ref 1–3.3)
LYMPHOCYTES # BLD AUTO: 24.6 % — SIGNIFICANT CHANGE UP (ref 13–44)
MCHC RBC-ENTMCNC: 31.1 PG — SIGNIFICANT CHANGE UP (ref 27–34)
MCHC RBC-ENTMCNC: 34.2 GM/DL — SIGNIFICANT CHANGE UP (ref 32–36)
MCV RBC AUTO: 90.9 FL — SIGNIFICANT CHANGE UP (ref 80–100)
MONOCYTES # BLD AUTO: 0.9 K/UL — SIGNIFICANT CHANGE UP (ref 0–0.9)
MONOCYTES NFR BLD AUTO: 8.7 % — SIGNIFICANT CHANGE UP (ref 2–14)
NEUTROPHILS # BLD AUTO: 6.7 K/UL — SIGNIFICANT CHANGE UP (ref 1.8–7.4)
NEUTROPHILS NFR BLD AUTO: 65.2 % — SIGNIFICANT CHANGE UP (ref 43–77)
PLATELET # BLD AUTO: 252 K/UL — SIGNIFICANT CHANGE UP (ref 150–400)
POTASSIUM SERPL-MCNC: 4.1 MMOL/L — SIGNIFICANT CHANGE UP (ref 3.5–5.3)
POTASSIUM SERPL-SCNC: 4.1 MMOL/L — SIGNIFICANT CHANGE UP (ref 3.5–5.3)
PROT SERPL-MCNC: 6.5 G/DL — SIGNIFICANT CHANGE UP (ref 6–8.3)
RBC # BLD: 3.61 M/UL — LOW (ref 4.2–5.8)
RBC # FLD: 12.2 % — SIGNIFICANT CHANGE UP (ref 10.3–14.5)
SODIUM SERPL-SCNC: 138 MMOL/L — SIGNIFICANT CHANGE UP (ref 135–145)
TROPONIN T, HIGH SENSITIVITY RESULT: <6 NG/L — SIGNIFICANT CHANGE UP (ref 0–51)
WBC # BLD: 10.3 K/UL — SIGNIFICANT CHANGE UP (ref 3.8–10.5)
WBC # FLD AUTO: 10.3 K/UL — SIGNIFICANT CHANGE UP (ref 3.8–10.5)

## 2019-04-10 PROCEDURE — 99215 OFFICE O/P EST HI 40 MIN: CPT

## 2019-04-10 PROCEDURE — 99284 EMERGENCY DEPT VISIT MOD MDM: CPT | Mod: 25

## 2019-04-10 PROCEDURE — 84484 ASSAY OF TROPONIN QUANT: CPT

## 2019-04-10 PROCEDURE — 85027 COMPLETE CBC AUTOMATED: CPT

## 2019-04-10 PROCEDURE — 99024 POSTOP FOLLOW-UP VISIT: CPT

## 2019-04-10 PROCEDURE — 80053 COMPREHEN METABOLIC PANEL: CPT

## 2019-04-10 PROCEDURE — 93005 ELECTROCARDIOGRAM TRACING: CPT

## 2019-04-10 PROCEDURE — 93010 ELECTROCARDIOGRAM REPORT: CPT

## 2019-04-10 PROCEDURE — 82962 GLUCOSE BLOOD TEST: CPT

## 2019-04-10 RX ORDER — SODIUM CHLORIDE 9 MG/ML
1000 INJECTION INTRAMUSCULAR; INTRAVENOUS; SUBCUTANEOUS ONCE
Qty: 0 | Refills: 0 | Status: COMPLETED | OUTPATIENT
Start: 2019-04-10 | End: 2019-04-10

## 2019-04-10 RX ADMIN — SODIUM CHLORIDE 1000 MILLILITER(S): 9 INJECTION INTRAMUSCULAR; INTRAVENOUS; SUBCUTANEOUS at 15:52

## 2019-04-10 NOTE — ED PROVIDER NOTE - PROGRESS NOTE DETAILS
contacted neurosurgery to inform patient is in ED and to come down to suture removal. -Santa Lowery PA-C Attending MD Kidd: Patient seen by neurosurgery, sutures are absorbable, those that were visible were reported removed by neurosurgery resident, reports three staples placed.  Cleared from neurosurgical standpoint. Attending MD Kidd: Attending MD Kidd: Patient re-evaluated and feeling improved.  No acute issues at  this time.  Lab tests reviewed with patient and wife.  Patient feels at baseline.  Knows to follow up with neurosurgeyr.  Patient stable for discharge. Follow up instructions given, importance of follow up emphasized, return to ED parameters reviewed and patient verbalized understanding.  All questions answered, all concerns addressed.

## 2019-04-10 NOTE — ED ADULT NURSE NOTE - PMH
History of spontaneous intraparenchymal intracranial hemorrhage    HTN (hypertension)    Moyamoya disease

## 2019-04-10 NOTE — ED ADULT NURSE NOTE - OBJECTIVE STATEMENT
44M pt AxOx3 brought down to ED via stretcher from [facility] s/p near syncope. PMHx HTN on Amlodipine, MoyaMoya diagnosed after having ICH on 3/22/19, craniotomy 4/2/19, dc'd 4/4/19. Pt states he was here for outpatient f/u and suture removal. Pt states he felt really hot and sweaty while he was sitting in a chair and getting his sutures removed. As per resident that accompanied pt down, pt never lost consciousness but was pale and diaphoretic and pt was hypotensive 70s/40s. Pt denies head injury/LOC. Pt denies CP/SOB/N/V/dizziness. Pt denies complications after discharge, denies fever/chills and is tolerating PO intake well. On arrival,  pt is well in appearance, non-diaphoretic. Lungs clear, resp even, unlabored. Abd soft/NT/ND. Pt arrives w/ incision site, dry and intact to L temperoparietal w/ dissolvable sutures intact. PERRLA. EKG @ bedside. Gross Neuro intact. Speech is clear and coherent. Small ecchymosis noted to L periorbital region, skin intact. Pt states he feels better, "almost back to normal". CM in place - NSR. #18G RAC, labs drawn and sent. NS infusing as per MD. MD at bedside for eval. Spouse at bedside. Will continue to monitor.

## 2019-04-10 NOTE — ED PROVIDER NOTE - ATTENDING CONTRIBUTION TO CARE
Attending MD Kidd:   I personally have seen and examined this patient.  Physician assistant note reviewed and agree on plan of care and except where noted.  See HPI for details.

## 2019-04-10 NOTE — ED PROVIDER NOTE - NSFOLLOWUPINSTRUCTIONS_ED_ALL_ED_FT
1. Continue any home medications as directed   2. Follow-up with Dr. Schroeder as directed by neurosurgery team   3. Return to the ER for any new or worsening symptoms

## 2019-04-10 NOTE — ED PROVIDER NOTE - NS ED ROS FT
Constitutional: No fever or chills  Eyes: No visual changes, eye pain or redness  HEENT: No throat pain, ear pain, nasal pain. No nose bleeding.  CV: No chest pain or lower extremity edema  Resp: No SOB no cough  GI: No abd pain. No nausea or vomiting. No diarrhea. No constipation.   : No dysuria, hematuria.   MSK: No musculoskeletal pain  Skin: No rash  Neuro: No headache. No numbness or tingling. No weakness. +near fainting episode, lightheadedness resolved.

## 2019-04-10 NOTE — ED PROVIDER NOTE - OBJECTIVE STATEMENT
Attending MD Kidd:      44M with PMH/PSH including HTN on Amlodipine, Flores Flores disease diagnosed after found to have intracranial hemorrhage on 3/22/19, had craniotomy with bypass STA-MCA bypass on 4/2/19, discharged on 4/4/19 presents to the ED with near syncopal episode.  Patient was a rapid response on 9 [place], outpatient neurosurgical clinic. during suture removal.  Patient felt himself getting really hot, felt as body was starting to sweat.  As per Medicine Resident that accompanied patient to ED, patient remained responsive to questions but pale, no fall, did not hit head, was sitting in chair during episode.  Reports BP 70s/40s.  Reports improved during evaluation but given BP sent to ED for eval.  On arrival, .  Reports feels "just about" back to baseline.  Since discharge, denies falls, syncope.    Denies chest pain, shortness of breath, palpitations. Denies abdominal pain, nausea, vomiting, diarrhea, blood in stools. Episode was about 30 min ago.  Reports last meal was around noon.  Denies fevers, chills.  On exam, NAD, CN 2-12 grossly intact, head with healing surgical scar at L temperoparietal scar, PERRL, small ecchymosis infraorbital on L (wife reports improving), FROM at neck, no tenderness to midline palpation, no stepoffs along length of spine, lungs CTAB with good inspiratory effort, +S1S2, no m/r/g, abdomen soft with +BS, NT, ND, no CVAT, moving all extremities with 5/5 strength bilateral upper and lower extremities, good and equal  strength bilaterally, sensory grossly intact; A/P: 44M with near syncopal episode during suture removal, will obtain labs, give IVFs, EKG reviewed, will compare to previous, will call neurosurgery as sutures not removed (happened after first one removed), will reassess Attending MD Kidd:      44M with PMH/PSH including HTN on Amlodipine, Flores Flores disease diagnosed after found to have intracranial hemorrhage on 3/22/19, had craniotomy with bypass STA-MCA bypass on 4/2/19, discharged on 4/4/19 presents to the ED with near syncopal episode.  Patient was a rapid response on 9 [place], outpatient neurosurgical clinic. during suture removal.  Patient felt himself getting really hot, felt as body was starting to sweat.  As per Medicine Resident that accompanied patient to ED, patient remained responsive to questions but pale, no fall, did not hit head, was sitting in chair during episode.  Reports BP 70s/40s.  Reports improved during evaluation but given BP sent to ED for eval.  On arrival, .  Reports feels "just about" back to baseline.  Since discharge, denies falls, syncope.  Denies chest pain, shortness of breath, palpitations. Denies abdominal pain, nausea, vomiting, diarrhea, blood in stools. Episode was about 30 min ago.  Reports last meal was around noon.  Denies fevers, chills.  On exam, NAD, CN 2-12 grossly intact, head with healing surgical scar at L temperoparietal scar, PERRL, small ecchymosis infraorbital on L (wife reports improving), FROM at neck, no tenderness to midline palpation, no stepoffs along length of spine, lungs CTAB with good inspiratory effort, +S1S2, no m/r/g, abdomen soft with +BS, NT, ND, no CVAT, moving all extremities with 5/5 strength bilateral upper and lower extremities, good and equal  strength bilaterally, sensory grossly intact; A/P: 44M with near syncopal episode during suture removal, will obtain labs, give IVFs, EKG reviewed, will compare to previous, will call neurosurgery as sutures not removed (happened after first one removed), will reassess

## 2019-04-10 NOTE — ASSESSMENT
[FreeTextEntry1] : Impression: 44yr old male s/p left sta to mca bypass 4/2/2019 for left side dang dang disease \par \par Plan: \par Surgical Incision clean, dry, sutures intact, healing well \par Follow up in the office with Dr. Badillo May 2, 2019 for a wound check \par MRI brain with and without contrast in 6months \par \par **Addendum***\par Patient was sitting in the chair while sutures were being removed, he stated he is "very hot and not feeling well", he became pale and his skin became cool and clammy. I got a stat set of vital signs on the patient and called an RRT. Throughout the event he maintained consciousness and was able to tell me his name and where he was. Dr. Naga Nava evaluated the patient as well and the patient began to become more alert and feel better before the rapid response team arrived. When the rapid response team arrived the patient was fully awake alert and oriented, he was then transferred to the ED for further management. \par

## 2019-04-10 NOTE — ED PROVIDER NOTE - PHYSICAL EXAMINATION
GEN: Well Appearing, Nontoxic, NAD  HEENT: NC/AT, Symm Facies. PERRL, EOMI, MMM, posterior pharynx clear  CV: No JVD/Bruits or stridor;  +S1S2, RRR w/o m/g/r  RESP: CTAB w/o w/r/r  ABD: Soft, nt/nd, +BS. No guarding/rebound. No RUQ tender, no CVAT  EXT/MSK: No lower extremity edema or calf tenderness. WWP, palpable pulses. FROMx4  SKIN: well healing sutured wound to L parietotemporal region.   Neuro: Grossly intact, AOX3 with normal speech, CN II-XII intact; Sensation intact, motor 5/5 throughout. Gait normal

## 2019-04-10 NOTE — PHYSICAL EXAM
[General Appearance - Alert] : alert [General Appearance - In No Acute Distress] : in no acute distress [Clean] : clean [Sutures Intact] : closed with intact sutures [Healing Well] : healing well [Dry] : dry [Place] : oriented to place [Time] : oriented to time [Person] : oriented to person [Motor Strength] : muscle strength was normal in all four extremities [Involuntary Movements] : no involuntary movements were seen [Abnormal Walk] : normal gait [] : no respiratory distress [Exaggerated Use Of Accessory Muscles For Inspiration] : no accessory muscle use [Erythema] : not erythematous [Warm] : not warm [FreeTextEntry6] : dried blood scabbing present over the sutures [FreeTextEntry1] : anterior neck ecchymosis

## 2019-04-22 ENCOUNTER — OTHER (OUTPATIENT)
Age: 44
End: 2019-04-22

## 2019-04-25 ENCOUNTER — APPOINTMENT (OUTPATIENT)
Dept: NEUROSURGERY | Facility: CLINIC | Age: 44
End: 2019-04-25
Payer: COMMERCIAL

## 2019-04-25 VITALS
BODY MASS INDEX: 29.78 KG/M2 | TEMPERATURE: 97.6 F | HEART RATE: 93 BPM | DIASTOLIC BLOOD PRESSURE: 83 MMHG | RESPIRATION RATE: 18 BRPM | WEIGHT: 208 LBS | SYSTOLIC BLOOD PRESSURE: 122 MMHG | HEIGHT: 70 IN | OXYGEN SATURATION: 96 %

## 2019-04-25 PROCEDURE — 99024 POSTOP FOLLOW-UP VISIT: CPT

## 2019-04-25 NOTE — ASSESSMENT
[FreeTextEntry1] : Impression: 44yr old male s/p left sta to mca bypass 4/2/2019 for left side dang dang disease \par \par Plan: \par Surgical Incision healing well no signs or symptoms of infection, staples removed today.\par Ok to wash hair with baby shampoo\par Educated patient and spouse on signs and symptoms of surgical site infection and need to call our office if any arise. \par Follow up in the office with Dr. Badillo May 9, 2019 for a wound check \par MRI brain with and without contrast in 6months \par

## 2019-04-25 NOTE — HISTORY OF PRESENT ILLNESS
[FreeTextEntry1] : Catrachito Laureano is a 44yr old male here for a post operative visit after undergoing a left STA to MCA bypass for left sided dang dang disease 4/2/2019. This is his second post op visit. He had three staples placed in his left forehead 4/10/2019 in [facility] ED. He is here today for staple removal. Since he has been home he feels well. His surgical incision is clean dry healing well.

## 2019-04-25 NOTE — REASON FOR VISIT
[Spouse] : spouse [de-identified] :  status post left STA/MCA bypass and he is here for a post-op visit.  [de-identified] : 4/2/2019

## 2019-04-25 NOTE — CONSULT LETTER
[Dear  ___] : Dear  [unfilled], [Consult Letter:] : I had the pleasure of evaluating your patient, [unfilled]. [Consult Closing:] : Thank you very much for allowing me to participate in the care of this patient.  If you have any questions, please do not hesitate to contact me. [Sincerely,] : Sincerely, [___] : [unfilled] [FreeTextEntry1] : As you know he is a 44yr old male with no significant past medical history. He presented to [facility] with a intraparenchymal hemorrhage in left ventral parietal lobe in March 2019.  He underwent diagnostic cerebral angiography 3/18/2019 which demonstrated left sided dang dang syndrome. On 4/2/2019 he had a left STA to MCA bypass for left sided dang dang disease. He tolerated the surgery well and was discharged home neurologically intact in stable condition. His surgical incision is healing well without any signs or symptoms of infection. I recommend he follow up in the office in two weeks for another wound check and then have MRI brain with and without contrast done in six months, October 2019.   [FreeTextEntry3] : Emily Ross NP

## 2019-04-25 NOTE — PHYSICAL EXAM
[General Appearance - Alert] : alert [General Appearance - In No Acute Distress] : in no acute distress [Clean] : clean [Dry] : dry [Healing Well] : healing well [Staple Intact] : closed with intact staples [No Drainage] : without drainage [Motor Strength] : muscle strength was normal in all four extremities [Involuntary Movements] : no involuntary movements were seen [] : no respiratory distress [Abnormal Walk] : normal gait [Warm] : not warm [Erythema] : not erythematous [FreeTextEntry1] : left crani

## 2019-05-09 ENCOUNTER — APPOINTMENT (OUTPATIENT)
Dept: NEUROSURGERY | Facility: CLINIC | Age: 44
End: 2019-05-09
Payer: COMMERCIAL

## 2019-05-09 VITALS
TEMPERATURE: 97.5 F | DIASTOLIC BLOOD PRESSURE: 94 MMHG | HEART RATE: 71 BPM | BODY MASS INDEX: 29.78 KG/M2 | SYSTOLIC BLOOD PRESSURE: 139 MMHG | WEIGHT: 208 LBS | OXYGEN SATURATION: 95 % | RESPIRATION RATE: 19 BRPM | HEIGHT: 70 IN

## 2019-05-09 PROCEDURE — 99024 POSTOP FOLLOW-UP VISIT: CPT

## 2019-05-09 NOTE — HISTORY OF PRESENT ILLNESS
[FreeTextEntry1] : Catrachito Laureano is a 44yr old male here today for a post op visit after undergoing a left STA to MCA bypass for left sided dang dang disease 4/2/2019. His surgical incision is healing well. He denies fevers. He states he feels great denies any motor, sensory, speech visual abnormalities at this time. The headaches and neck pain he presented with have resolved.

## 2019-05-09 NOTE — PHYSICAL EXAM
[General Appearance - Alert] : alert [General Appearance - In No Acute Distress] : in no acute distress [Clean] : clean [Dry] : dry [Healing Well] : healing well [Intact] : intact [No Drainage] : without drainage [Person] : oriented to person [Time] : oriented to time [Place] : oriented to place [Motor Strength] : muscle strength was normal in all four extremities [Involuntary Movements] : no involuntary movements were seen [] : no respiratory distress [Abnormal Walk] : normal gait [Exaggerated Use Of Accessory Muscles For Inspiration] : no accessory muscle use [Erythema] : not erythematous [Warm] : not warm [FreeTextEntry1] : left crani

## 2019-05-09 NOTE — ASSESSMENT
[FreeTextEntry1] : Impression: 44yr old male s/p left sta to mca bypass 4/2/2019 for left side dang dang disease \par \par Plan: \par Surgical incision healing well, no signs or symptoms of infection\par MRA brain non con with [facility] October 2019 then follow up in the office after \par Repeat Cerebral Angiogram October 2019 after MRA and office visit is completed with us.  \par NO restrictions on activity

## 2019-05-16 ENCOUNTER — OTHER (OUTPATIENT)
Age: 44
End: 2019-05-16

## 2019-05-20 ENCOUNTER — OTHER (OUTPATIENT)
Age: 44
End: 2019-05-20

## 2019-06-10 NOTE — PATIENT PROFILE ADULT - BRADEN SENSORY
Detail Level: Simple Dapsone Pregnancy And Lactation Text: This medication is Pregnancy Category C and is not considered safe during pregnancy or breast feeding. Isotretinoin Counseling: Patient should get monthly blood tests, not donate blood, not drive at night if vision affected, not share medication, and not undergo elective surgery for 6 months after tx completed. Side effects reviewed, pt to contact office should one occur. Minocycline Pregnancy And Lactation Text: This medication is Pregnancy Category D and not consider safe during pregnancy. It is also excreted in breast milk. Bactrim Pregnancy And Lactation Text: This medication is Pregnancy Category D and is known to cause fetal risk.  It is also excreted in breast milk. Tazorac Pregnancy And Lactation Text: This medication is not safe during pregnancy. It is unknown if this medication is excreted in breast milk. Benzoyl Peroxide Counseling: Patient counseled that medicine may cause skin irritation and bleach clothing.  In the event of skin irritation, the patient was advised to reduce the amount of the drug applied or use it less frequently.   The patient verbalized understanding of the proper use and possible adverse effects of benzoyl peroxide.  All of the patient's questions and concerns were addressed. Doxycycline Counseling:  Patient counseled regarding possible photosensitivity and increased risk for sunburn.  Patient instructed to avoid sunlight, if possible.  When exposed to sunlight, patients should wear protective clothing, sunglasses, and sunscreen.  The patient was instructed to call the office immediately if the following severe adverse effects occur:  hearing changes, easy bruising/bleeding, severe headache, or vision changes.  The patient verbalized understanding of the proper use and possible adverse effects of doxycycline.  All of the patient's questions and concerns were addressed. Isotretinoin Pregnancy And Lactation Text: This medication is Pregnancy Category X and is considered extremely dangerous during pregnancy. It is unknown if it is excreted in breast milk. Spironolactone Counseling: Patient advised regarding risks of diarrhea, abdominal pain, hyperkalemia, birth defects (for female patients), liver toxicity and renal toxicity. The patient may need blood work to monitor liver and kidney function and potassium levels while on therapy. The patient verbalized understanding of the proper use and possible adverse effects of spironolactone.  All of the patient's questions and concerns were addressed. Topical Clindamycin Counseling: Patient counseled that this medication may cause skin irritation or allergic reactions.  In the event of skin irritation, the patient was advised to reduce the amount of the drug applied or use it less frequently.   The patient verbalized understanding of the proper use and possible adverse effects of clindamycin.  All of the patient's questions and concerns were addressed. Benzoyl Peroxide Pregnancy And Lactation Text: This medication is Pregnancy Category C. It is unknown if benzoyl peroxide is excreted in breast milk. Birth Control Pills Counseling: Birth Control Pill Counseling: I discussed with the patient the potential side effects of OCPs including but not limited to increased risk of stroke, heart attack, thrombophlebitis, deep venous thrombosis, hepatic adenomas, breast changes, GI upset, headaches, and depression.  The patient verbalized understanding of the proper use and possible adverse effects of OCPs. All of the patient's questions and concerns were addressed. Use Enhanced Medication Counseling?: No High Dose Vitamin A Counseling: Side effects reviewed, pt to contact office should one occur. Doxycycline Pregnancy And Lactation Text: This medication is Pregnancy Category D and not consider safe during pregnancy. It is also excreted in breast milk but is considered safe for shorter treatment courses. Azithromycin Counseling:  I discussed with the patient the risks of azithromycin including but not limited to GI upset, allergic reaction, drug rash, diarrhea, and yeast infections. Topical Retinoid counseling:  Patient advised to apply a pea-sized amount only at bedtime and wait 30 minutes after washing their face before applying.  If too drying, patient may add a non-comedogenic moisturizer. The patient verbalized understanding of the proper use and possible adverse effects of retinoids.  All of the patient's questions and concerns were addressed. Spironolactone Pregnancy And Lactation Text: This medication can cause feminization of the male fetus and should be avoided during pregnancy. The active metabolite is also found in breast milk. Birth Control Pills Pregnancy And Lactation Text: This medication should be avoided if pregnant and for the first 30 days post-partum. Topical Clindamycin Pregnancy And Lactation Text: This medication is Pregnancy Category B and is considered safe during pregnancy. It is unknown if it is excreted in breast milk. Erythromycin Counseling:  I discussed with the patient the risks of erythromycin including but not limited to GI upset, allergic reaction, drug rash, diarrhea, increase in liver enzymes, and yeast infections. High Dose Vitamin A Pregnancy And Lactation Text: High dose vitamin A therapy is contraindicated during pregnancy and breast feeding. Azithromycin Pregnancy And Lactation Text: This medication is considered safe during pregnancy and is also secreted in breast milk. Tetracycline Counseling: Patient counseled regarding possible photosensitivity and increased risk for sunburn.  Patient instructed to avoid sunlight, if possible.  When exposed to sunlight, patients should wear protective clothing, sunglasses, and sunscreen.  The patient was instructed to call the office immediately if the following severe adverse effects occur:  hearing changes, easy bruising/bleeding, severe headache, or vision changes.  The patient verbalized understanding of the proper use and possible adverse effects of tetracycline.  All of the patient's questions and concerns were addressed. Patient understands to avoid pregnancy while on therapy due to potential birth defects. Topical Sulfur Applications Counseling: Topical Sulfur Counseling: Patient counseled that this medication may cause skin irritation or allergic reactions.  In the event of skin irritation, the patient was advised to reduce the amount of the drug applied or use it less frequently.   The patient verbalized understanding of the proper use and possible adverse effects of topical sulfur application.  All of the patient's questions and concerns were addressed. Topical Retinoid Pregnancy And Lactation Text: This medication is Pregnancy Category C. It is unknown if this medication is excreted in breast milk. Detail Level: Zone Dapsone Counseling: I discussed with the patient the risks of dapsone including but not limited to hemolytic anemia, agranulocytosis, rashes, methemoglobinemia, kidney failure, peripheral neuropathy, headaches, GI upset, and liver toxicity.  Patients who start dapsone require monitoring including baseline LFTs and weekly CBCs for the first month, then every month thereafter.  The patient verbalized understanding of the proper use and possible adverse effects of dapsone.  All of the patient's questions and concerns were addressed. (4) no impairment Erythromycin Pregnancy And Lactation Text: This medication is Pregnancy Category B and is considered safe during pregnancy. It is also excreted in breast milk. Bactrim Counseling:  I discussed with the patient the risks of sulfa antibiotics including but not limited to GI upset, allergic reaction, drug rash, diarrhea, dizziness, photosensitivity, and yeast infections.  Rarely, more serious reactions can occur including but not limited to aplastic anemia, agranulocytosis, methemoglobinemia, blood dyscrasias, liver or kidney failure, lung infiltrates or desquamative/blistering drug rashes. Minocycline Counseling: Patient advised regarding possible photosensitivity and discoloration of the teeth, skin, lips, tongue and gums.  Patient instructed to avoid sunlight, if possible.  When exposed to sunlight, patients should wear protective clothing, sunglasses, and sunscreen.  The patient was instructed to call the office immediately if the following severe adverse effects occur:  hearing changes, easy bruising/bleeding, severe headache, or vision changes.  The patient verbalized understanding of the proper use and possible adverse effects of minocycline.  All of the patient's questions and concerns were addressed. Topical Sulfur Applications Pregnancy And Lactation Text: This medication is Pregnancy Category C and has an unknown safety profile during pregnancy. It is unknown if this topical medication is excreted in breast milk. Tazorac Counseling:  Patient advised that medication is irritating and drying.  Patient may need to apply sparingly and wash off after an hour before eventually leaving it on overnight.  The patient verbalized understanding of the proper use and possible adverse effects of tazorac.  All of the patient's questions and concerns were addressed.

## 2019-07-15 ENCOUNTER — APPOINTMENT (OUTPATIENT)
Dept: NEUROLOGY | Facility: CLINIC | Age: 44
End: 2019-07-15

## 2019-07-25 ENCOUNTER — APPOINTMENT (OUTPATIENT)
Dept: NEUROLOGY | Facility: CLINIC | Age: 44
End: 2019-07-25
Payer: COMMERCIAL

## 2019-07-25 VITALS
SYSTOLIC BLOOD PRESSURE: 128 MMHG | WEIGHT: 215 LBS | BODY MASS INDEX: 30.78 KG/M2 | HEIGHT: 70 IN | DIASTOLIC BLOOD PRESSURE: 81 MMHG | HEART RATE: 86 BPM

## 2019-07-25 DIAGNOSIS — R41.3 OTHER AMNESIA: ICD-10-CM

## 2019-07-25 PROCEDURE — 93880 EXTRACRANIAL BILAT STUDY: CPT

## 2019-07-25 PROCEDURE — 99215 OFFICE O/P EST HI 40 MIN: CPT

## 2019-07-25 NOTE — PHYSICAL EXAM
[General Appearance - Alert] : alert [General Appearance - In No Acute Distress] : in no acute distress [Oriented To Time, Place, And Person] : oriented to person, place, and time [Impaired Insight] : insight and judgment were intact [Affect] : the affect was normal [Person] : oriented to person [Place] : oriented to place [Time] : oriented to time [Short Term Intact] : short term memory intact [Concentration Intact] : normal concentrating ability [Visual Intact] : visual attention was ~T not ~L decreased [Naming Objects] : no difficulty naming common objects [Repeating Phrases] : no difficulty repeating a phrase [Fluency] : fluency intact [Cranial Nerves Optic (II)] : visual acuity intact bilaterally,  visual fields full to confrontation, pupils equal round and reactive to light [Cranial Nerves Oculomotor (III)] : extraocular motion intact [Cranial Nerves Trigeminal (V)] : facial sensation intact symmetrically [Cranial Nerves Facial (VII)] : face symmetrical [Cranial Nerves Vestibulocochlear (VIII)] : hearing was intact bilaterally [Cranial Nerves Glossopharyngeal (IX)] : tongue and palate midline [Cranial Nerves Accessory (XI - Cranial And Spinal)] : head turning and shoulder shrug symmetric [Cranial Nerves Hypoglossal (XII)] : there was no tongue deviation with protrusion [Motor Tone] : muscle tone was normal in all four extremities [Motor Strength] : muscle strength was normal in all four extremities [No Muscle Atrophy] : normal bulk in all four extremities [Paresis Pronator Drift Right-Sided] : no pronator drift on the right [Paresis Pronator Drift Left-Sided] : no pronator drift on the left [Motor Strength Upper Extremities Bilaterally] : strength was normal in both upper extremities [Motor Strength Lower Extremities Bilaterally] : strength was normal in both lower extremities [5] : ankle plantar flexion 5/5 [Sensation Tactile Decrease] : light touch was intact [Abnormal Walk] : normal gait [Balance] : balance was intact [Limited Balance] : balance was intact [Past-pointing] : there was no past-pointing [Tremor] : no tremor present [Dysdiadochokinesia Bilaterally] : not present [Coordination - Dysmetria Impaired Finger-to-Nose Bilateral] : not present [Coordination - Dysmetria Impaired Heel-to-Shin Bilateral] : not present [2+] : Ankle jerk left 2+ [Plantar Reflex Right Only] : normal on the right [Plantar Reflex Left Only] : normal on the left [Sclera] : the sclera and conjunctiva were normal [PERRL With Normal Accommodation] : pupils were equal in size, round, reactive to light, with normal accommodation [Extraocular Movements] : extraocular movements were intact

## 2019-07-25 NOTE — DISCUSSION/SUMMARY
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 Educated on medication compliance and BP monitoring to prevent secondary stroke and systemic problems . \par - Follow up with PCP for statin management and primary care needs such as regular blood work including monitoring of HbA1c (4.6) and cholesterol profile (goal LDL <70), to modify vascular risk factors \par - Home sleep study referral given to patient to assess for sleep apnea as a vascular risk factor\par \par - Carotid Doppler's and TCD to be obtain with next visit \par - Educated on stroke/TIA signs/bleeding signs and symptoms and to call 911 if experiencing any of these symptoms\par \par For slow processing that he feels I recommend Neuropsychological evaluation.\par encouraged him for home sleep study. [Intensive Blood Pressure Control] : intensive blood pressure control [Patient encouraged to discuss with Primary MD] : I encouraged the patient to discuss these important issues with ~his/her~ primary care doctor [Goals and Counseling] : I have reviewed the goals of stroke risk factor modification. I counseled the patient on measures to reduce stroke risk, including the importance of medication compliance, risk factor control, exercise, healthy diet and avoidance of smoking. I reviewed stroke warning signs and symptoms and appropriate actions to take if such occur.

## 2019-07-25 NOTE — HISTORY OF PRESENT ILLNESS
[FreeTextEntry1] : Mr. Laureano is a 44 year old male PMHx HTN who presents today for  follow up after a L ICH on 3/15/19 ; STA MCA bypass on 04/12/19; for  Flores Flores\par \par He feels tired at times, sleepy during the day. He feels lightheaded while bending down. He is back to work since beginning of June.\par Neurological review of systems is positive for left sided pressure,  hemicrania headaches. he does complain of some slow processing.\par \par HPI:\par 45 yo RH  male PMH HTN tx from OSH, p/w HA. LKW 3/11 2230. Patient developed sudden onset HA(felt like baseball bat to the head) at 2230 on 3/11; 15 minutes later he developed intractable N/V. Went to Urgent Care 3/12, sent home on Tamiflu. HA/nausea/vomiting persisted, so patient went to [facility] ED where CT/CTA done showing IPH in L ventral parietal lobe rupturing into subarachnoid space, CTA head w/ findings concerning for Flores Flores. Transferred to [facility] for further workup. On initial evaluation at [facility], patient HA improved although with photophobia, no focal neurologic deficits, NIHSS 0. \par \par CT HEAD 3/16/19\par Acute intraparenchymal hemorrhage in the ventral left parietal lobe \par measuring 1.9 x 1 x 1 cm appears to have ruptured into the subarachnoid \par space.  There is mild subarachnoid hemorrhage in the left sylvian fissure \par and trace subarachnoid hemorrhage in the left frontal and parietal \par regions.  Follow-up CT is recommended in 6 hour to exclude worsening \par hemorrhage.\par \par CTA HEAD/NECK 3.16.19\par CT BRAIN: Parenchymal hemorrhage in the left posterior basal ganglia at \par the junction of the posterior limb of the internal capsule measuring 1.1 \par x 1.1 cm with surrounding perihematoma edema. Subarachnoid hemorrhage in \par the left sylvian fissure and left sylvian cistern likely due to rupture \par of the hematoma into the subarachnoid space. Trace high left parietal \par subarachnoid hemorrhage.\par \par CTA HEAD: Diminution of the left internal carotid artery in comparison to \par the right with tapering of the supraclinoid left ICA up to the carotid \par terminus with lack of visualization of the left M1 segment. Serpiginous \par high density at the level of the left carotid terminus which could \par represent collateralization to lenticulostriates. Diminutive left M2 \par branches seen in the sylvian cistern and overall diminutive cortical \par branches of the left MCA distribution in comparison to the right. Left A1 \par segment supplied by the anterior communicating artery. Serpiginous tangle \par of vessels adjacent to the left anterior cerebral artery along the falx \par which also may represent collateralization. Overall, the constellation of \par findings is concerning for moyamoya disease. Further evaluation with \par diagnostic cerebral angiogram and MRI is recommended.\par \par CT HEAD 3.17.19\par Unchanged left intraparenchymal hemorrhage involving the posterior limb \par of the left internal capsule, left external capsule and putamen with \par surrounding edema and small associated subarachnoid hemorrhage. No \par midline shift.\par \par No new areas of hemorrhage. 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 Evidence of the left supraclinoid carotid occlusion is noted with the \par presence of a hemorrhage in the distal left internal capsule region in \par the posterior subinsular region.\par 2. MRA of the [place] of [place] demonstrates a left supraclinoid \par occlusion. The left A2 is fed from the right. Evidence of \par collateralization with increased flow in the left A2 and left PCA as \par indicated on the  NOVA study. No left M1 is visualized. The more distal \par MCA on the left likely fills from collateralization via the hypertrophied \par  lenticulostriates visualized on patient's catheter angiogram 3/18/2019 \par as well as the leptomeningeal collaterals. Nova report is included for \par review.\par \par NM SPECT WITH DIAMOX 3.19.19\par Decreased uptake in the right caudate nucleus and right putamen with no \par corresponding abnormality on CT.\par Mildly decreased uptake corresponding to left intraparenchymal hemorrhage \par seen on CT from 3/16/2019 and 3/17/2019.\par \par NM SPECT WITH DIAMOX 3.21.19\par Decreased uptake in the right caudate nucleus and right putamen with no \par corresponding abnormality on CT and mildly decreased uptake corresponding \par to left intraparenchymal hemorrhage seen on CT from 3/16/2019 and \par 3/17/2019, not significantly changed from the Diamox SPECT/CT on \par 3/19/2019.\par \par CT CHEST 3.21.19\par No pulmonary embolus is noted.\par \par TTE 3.17.19\par 1. Normal left ventricular internal dimensions and wall\par thicknesses.\par 2. Normal left ventricular systolic function. No segmental\par wall motion abnormalities.\par \par

## 2019-10-11 ENCOUNTER — OUTPATIENT (OUTPATIENT)
Dept: OUTPATIENT SERVICES | Facility: HOSPITAL | Age: 44
LOS: 1 days | End: 2019-10-11
Payer: COMMERCIAL

## 2019-10-11 ENCOUNTER — APPOINTMENT (OUTPATIENT)
Dept: MRI IMAGING | Facility: CLINIC | Age: 44
End: 2019-10-11
Payer: COMMERCIAL

## 2019-10-11 DIAGNOSIS — I67.5 MOYAMOYA DISEASE: ICD-10-CM

## 2019-10-11 PROCEDURE — 70544 MR ANGIOGRAPHY HEAD W/O DYE: CPT

## 2019-10-11 PROCEDURE — 70544 MR ANGIOGRAPHY HEAD W/O DYE: CPT | Mod: 26

## 2019-10-16 ENCOUNTER — OUTPATIENT (OUTPATIENT)
Dept: OUTPATIENT SERVICES | Facility: HOSPITAL | Age: 44
LOS: 1 days | End: 2019-10-16
Payer: COMMERCIAL

## 2019-10-16 VITALS
HEART RATE: 74 BPM | HEIGHT: 70 IN | SYSTOLIC BLOOD PRESSURE: 114 MMHG | TEMPERATURE: 98 F | DIASTOLIC BLOOD PRESSURE: 80 MMHG | OXYGEN SATURATION: 96 % | WEIGHT: 220.02 LBS | RESPIRATION RATE: 14 BRPM

## 2019-10-16 DIAGNOSIS — I67.5 MOYAMOYA DISEASE: ICD-10-CM

## 2019-10-16 DIAGNOSIS — I10 ESSENTIAL (PRIMARY) HYPERTENSION: ICD-10-CM

## 2019-10-16 DIAGNOSIS — Z01.818 ENCOUNTER FOR OTHER PREPROCEDURAL EXAMINATION: ICD-10-CM

## 2019-10-16 DIAGNOSIS — Z98.890 OTHER SPECIFIED POSTPROCEDURAL STATES: Chronic | ICD-10-CM

## 2019-10-16 LAB
ANION GAP SERPL CALC-SCNC: 13 MMOL/L — SIGNIFICANT CHANGE UP (ref 5–17)
BLD GP AB SCN SERPL QL: NEGATIVE — SIGNIFICANT CHANGE UP
BUN SERPL-MCNC: 18 MG/DL — SIGNIFICANT CHANGE UP (ref 7–23)
CALCIUM SERPL-MCNC: 9.5 MG/DL — SIGNIFICANT CHANGE UP (ref 8.4–10.5)
CHLORIDE SERPL-SCNC: 102 MMOL/L — SIGNIFICANT CHANGE UP (ref 96–108)
CO2 SERPL-SCNC: 24 MMOL/L — SIGNIFICANT CHANGE UP (ref 22–31)
CREAT SERPL-MCNC: 1.24 MG/DL — SIGNIFICANT CHANGE UP (ref 0.5–1.3)
GLUCOSE SERPL-MCNC: 88 MG/DL — SIGNIFICANT CHANGE UP (ref 70–99)
HCT VFR BLD CALC: 40.5 % — SIGNIFICANT CHANGE UP (ref 39–50)
HGB BLD-MCNC: 13.7 G/DL — SIGNIFICANT CHANGE UP (ref 13–17)
MCHC RBC-ENTMCNC: 30.7 PG — SIGNIFICANT CHANGE UP (ref 27–34)
MCHC RBC-ENTMCNC: 33.8 GM/DL — SIGNIFICANT CHANGE UP (ref 32–36)
MCV RBC AUTO: 90.8 FL — SIGNIFICANT CHANGE UP (ref 80–100)
PLATELET # BLD AUTO: 208 K/UL — SIGNIFICANT CHANGE UP (ref 150–400)
POTASSIUM SERPL-MCNC: 3.7 MMOL/L — SIGNIFICANT CHANGE UP (ref 3.5–5.3)
POTASSIUM SERPL-SCNC: 3.7 MMOL/L — SIGNIFICANT CHANGE UP (ref 3.5–5.3)
RBC # BLD: 4.46 M/UL — SIGNIFICANT CHANGE UP (ref 4.2–5.8)
RBC # FLD: 12.1 % — SIGNIFICANT CHANGE UP (ref 10.3–14.5)
RH IG SCN BLD-IMP: POSITIVE — SIGNIFICANT CHANGE UP
SODIUM SERPL-SCNC: 139 MMOL/L — SIGNIFICANT CHANGE UP (ref 135–145)
WBC # BLD: 8.99 K/UL — SIGNIFICANT CHANGE UP (ref 3.8–10.5)
WBC # FLD AUTO: 8.99 K/UL — SIGNIFICANT CHANGE UP (ref 3.8–10.5)

## 2019-10-16 PROCEDURE — 86901 BLOOD TYPING SEROLOGIC RH(D): CPT

## 2019-10-16 PROCEDURE — 86900 BLOOD TYPING SEROLOGIC ABO: CPT

## 2019-10-16 PROCEDURE — G0463: CPT

## 2019-10-16 PROCEDURE — 80048 BASIC METABOLIC PNL TOTAL CA: CPT

## 2019-10-16 PROCEDURE — 85027 COMPLETE CBC AUTOMATED: CPT

## 2019-10-16 PROCEDURE — 86850 RBC ANTIBODY SCREEN: CPT

## 2019-10-16 RX ORDER — PANTOPRAZOLE SODIUM 20 MG/1
1 TABLET, DELAYED RELEASE ORAL
Qty: 0 | Refills: 0 | DISCHARGE

## 2019-10-16 NOTE — H&P PST ADULT - NSICDXPASTSURGICALHX_GEN_ALL_CORE_FT
PAST SURGICAL HISTORY:  History of craniotomy STA MCA bypass surgry 04/2019 PAST SURGICAL HISTORY:  History of craniotomy STA MCA bypass surgery 04/2019

## 2019-10-16 NOTE — H&P PST ADULT - HISTORY OF PRESENT ILLNESS
45 yo  male with s/p Superficial Temporal Artery- Middle Cerebral Artery bypass on 4/2/2019 for Dang dang, doing good.     PMH: HTN. Pt initially presented to urgent care on 3/13/2019 c/o HA which later developed into intractable N/V, he was diagnosed w/ the Flu and was sent home on Tamiflu. HA/nausea/vomiting persisted, so patient went to [facility] ED where CT/CTA was done showing IPH in L ventral parietal lobe rupturing into subarachnoid space (no residual), CTA head w/ findings concerning for Dang Dang. Transferred to [facility] on 3/16/2019 for further workup. 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 44 year old  male with s/p Superficial Temporal Artery- Middle Cerebral Artery bypass on 4/2/2019 for Dang dang, doing good, presents for follow up angiogram on 10/22/19.    PMH: HTN. Pt initially presented to urgent care on 3/13/2019 c/o HA which later developed into intractable N/V, he was diagnosed w/ the Flu and was sent home on Tamiflu. HA/nausea/vomiting persisted, so patient went to [facility] ED where CT/CTA was done showing IPH in L ventral parietal lobe rupturing into subarachnoid space (no residual), CTA head w/ findings concerning for Dang Dang. Transferred to [facility] on 3/16/2019 for further workup. 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

## 2019-10-16 NOTE — H&P PST ADULT - NSICDXPROBLEM_GEN_ALL_CORE_FT
PROBLEM DIAGNOSES  Problem: Moyamoya disease  Assessment and Plan: cerebral angiogram     Problem: HTN (hypertension)  Assessment and Plan: Instructed to continue meds &  take with sips of water in AM the day of surgery

## 2019-10-16 NOTE — H&P PST ADULT - NEGATIVE OPHTHALMOLOGIC SYMPTOMS
no blurred vision L/no lacrimation L/no lacrimation R/no blurred vision R/no diplopia/no photophobia

## 2019-10-16 NOTE — H&P PST ADULT - NSICDXPASTMEDICALHX_GEN_ALL_CORE_FT
PAST MEDICAL HISTORY:  History of spontaneous intraparenchymal intracranial hemorrhage 03/11/19    HTN (hypertension)     Moyamoya disease

## 2019-10-16 NOTE — H&P PST ADULT - NEGATIVE NEUROLOGICAL SYMPTOMS
no hemiparesis/no facial palsy/no loss of consciousness/no headache/no weakness/no generalized seizures/no tremors/no vertigo/no loss of sensation/no difficulty walking/no focal seizures/no confusion/no syncope

## 2019-10-16 NOTE — H&P PST ADULT - ASSESSMENT
s/p eft sylvian fissure SAH   CAPRINI SCORE [CLOT updated 18]    AGE RELATED RISK FACTORS                                                       MOBILITY RELATED FACTORS  [ ] Age 41-60 years                                            (1 Point)                    [ ] Bed rest                                                        (1 Point)  [ ] Age: 61-74 years                                           (2 Points)                  [ ] Plaster cast                                                   (2 Points)  [ ] Age= 75 years                                              (3 Points)                    [ ] Bed bound for more than 72 hours                 (2 Points)    DISEASE RELATED RISK FACTORS                                               GENDER SPECIFIC FACTORS  [ ] Edema in the lower extremities                       (1 Point)              [ ] Pregnancy                                                     (1 Point)  [ ] Varicose veins                                               (1 Point)                     [ ] Post-partum < 6 weeks                                   (1 Point)             [ ] BMI > 25 Kg/m2                                            (1 Point)                     [ ] Hormonal therapy  or oral contraception          (1 Point)                 [ ] Sepsis (in the previous month)                        (1 Point)               [ ] History of pregnancy complications                 (1 point)  [ ] Pneumonia or serious lung disease                                               [ ] Unexplained or recurrent                     (1 Point)           (in the previous month)                               (1 Point)  [ ] Abnormal pulmonary function test                     (1 Point)                 SURGERY RELATED RISK FACTORS  [ ] Acute myocardial infarction                              (1 Point)               [ ]  Section                                             (1 Point)  [ ] Congestive heart failure (in the previous month)  (1 Point)      [ ] Minor surgery                                                  (1 Point)   [ ] Inflammatory bowel disease                             (1 Point)               [ ] Arthroscopic surgery                                        (2 Points)  [ ] Central venous access                                      (2 Points)                [ ] General surgery lasting more than 45 minutes (2 points)  [ ] Present or previous malignancy                     (2 Points)                [ ] Elective arthroplasty                                         (5 points)    [ ] Stroke (in the previous month)                          (5 Points)                                                                                                                                                           HEMATOLOGY RELATED FACTORS                                                 TRAUMA RELATED RISK FACTORS  [ ] Prior episodes of VTE                                     (3 Points)                [ ] Fracture of the hip, pelvis, or leg                       (5 Points)  [ ] Positive family history for VTE                         (3 Points)             [ ] Acute spinal cord injury (in the previous month)  (5 Points)  [ ] Prothrombin 94293 A                                     (3 Points)               [ ] Paralysis  (less than 1 month)                             (5 Points)  [ ] Factor V Leiden                                             (3 Points)                  [ ] Multiple Trauma within 1 month                        (5 Points)  [ ] Lupus anticoagulants                                     (3 Points)                                                           [ ] Anticardiolipin antibodies                               (3 Points)                                                       [ ] High homocysteine in the blood                      (3 Points)                                             [ ] Other congenital or acquired thrombophilia      (3 Points)                                                [ ] Heparin induced thrombocytopenia                  (3 Points)                                     Total Score [ 9  ] s/p left sylvian fissure SAH , Dang dang disease                       (1 Point)  [ ] Age: 61-74 years                                           (2 Points)                  [ ] Plaster cast                                                   (2 Points)  [ ] Age= 75 years                                              (3 Points)                    [ ] Bed bound for more than 72 hours                 (2 Points)    DISEASE RELATED RISK FACTORS                                               GENDER SPECIFIC FACTORS  [ ] Edema in the lower extremities                       (1 Point)              [ ] Pregnancy                                                     (1 Point)  [ ] Varicose veins                                               (1 Point)                     [ ] Post-partum < 6 weeks                                   (1 Point)             [ ] BMI > 25 Kg/m2                                            (1 Point)                     [ ] Hormonal therapy  or oral contraception          (1 Point)                 [ ] Sepsis (in the previous month)                        (1 Point)               [ ] History of pregnancy complications                 (1 point)  [ ] Pneumonia or serious lung disease                                               [ ] Unexplained or recurrent                     (1 Point)           (in the previous month)                               (1 Point)  [ ] Abnormal pulmonary function test                     (1 Point)                 SURGERY RELATED RISK FACTORS  [ ] Acute myocardial infarction                              (1 Point)               [ ]  Section                                             (1 Point)  [ ] Congestive heart failure (in the previous month)  (1 Point)      [ ] Minor surgery                                                  (1 Point)   [ ] Inflammatory bowel disease                             (1 Point)               [ ] Arthroscopic surgery                                        (2 Points)  [ ] Central venous access                                      (2 Points)                [ ] General surgery lasting more than 45 minutes (2 points)  [ ] Present or previous malignancy                     (2 Points)                [ ] Elective arthroplasty                                         (5 points)    [ ] Stroke (in the previous month)                          (5 Points)                                                                                                                                                           HEMATOLOGY RELATED FACTORS                                                 TRAUMA RELATED RISK FACTORS  [ ] Prior episodes of VTE                                     (3 Points)                [ ] Fracture of the hip, pelvis, or leg                       (5 Points)  [ ] Positive family history for VTE                         (3 Points)             [ ] Acute spinal cord injury (in the previous month)  (5 Points)  [ ] Prothrombin 56237 A                                     (3 Points)               [ ] Paralysis  (less than 1 month)                             (5 Points)  [ ] Factor V Leiden                                             (3 Points)                  [ ] Multiple Trauma within 1 month                        (5 Points)  [ ] Lupus anticoagulants                                     (3 Points)                                                           [ ] Anticardiolipin antibodies                               (3 Points)                                                       [ ] High homocysteine in the blood                      (3 Points)                                             [ ] Other congenital or acquired thrombophilia      (3 Points)                                                [ ] Heparin induced thrombocytopenia                  (3 Points)                                     Total Score [ 9  ]

## 2019-10-22 ENCOUNTER — APPOINTMENT (OUTPATIENT)
Dept: NEUROSURGERY | Facility: CLINIC | Age: 44
End: 2019-10-22

## 2019-10-22 ENCOUNTER — OUTPATIENT (OUTPATIENT)
Dept: OUTPATIENT SERVICES | Facility: HOSPITAL | Age: 44
LOS: 1 days | End: 2019-10-22
Payer: COMMERCIAL

## 2019-10-22 ENCOUNTER — APPOINTMENT (OUTPATIENT)
Dept: NEUROSURGERY | Facility: HOSPITAL | Age: 44
End: 2019-10-22
Payer: COMMERCIAL

## 2019-10-22 DIAGNOSIS — Z98.890 OTHER SPECIFIED POSTPROCEDURAL STATES: Chronic | ICD-10-CM

## 2019-10-22 DIAGNOSIS — I67.5 MOYAMOYA DISEASE: ICD-10-CM

## 2019-10-22 PROCEDURE — C1887: CPT

## 2019-10-22 PROCEDURE — 36226 PLACE CATH VERTEBRAL ART: CPT

## 2019-10-22 PROCEDURE — 36227 PLACE CATH XTRNL CAROTID: CPT

## 2019-10-22 PROCEDURE — 36227 PLACE CATH XTRNL CAROTID: CPT | Mod: 50

## 2019-10-22 PROCEDURE — C1769: CPT

## 2019-10-22 PROCEDURE — 36224 PLACE CATH CAROTD ART: CPT | Mod: 50

## 2019-10-22 PROCEDURE — 36226 PLACE CATH VERTEBRAL ART: CPT | Mod: LT

## 2019-10-22 PROCEDURE — C1894: CPT

## 2019-10-22 PROCEDURE — 36224 PLACE CATH CAROTD ART: CPT

## 2019-10-22 RX ORDER — SODIUM CHLORIDE 9 MG/ML
1000 INJECTION INTRAMUSCULAR; INTRAVENOUS; SUBCUTANEOUS
Refills: 0 | Status: DISCONTINUED | OUTPATIENT
Start: 2019-10-22 | End: 2019-11-09

## 2019-10-23 PROBLEM — Z86.79 PERSONAL HISTORY OF OTHER DISEASES OF THE CIRCULATORY SYSTEM: Chronic | Status: ACTIVE | Noted: 2019-03-27

## 2019-10-23 NOTE — CHART NOTE - NSCHARTNOTEFT_GEN_A_CORE
Interventional Neuro Radiology  Pre-Procedure Note PA-C    HPI:  This is a 44yr old male with left side dang dang disease who underwent left STA to MCA bypass for left sided dang dang disease 4/2/2019.      Allergies: No Known Allergies      PAST MEDICAL & SURGICAL HISTORY:  History of spontaneous intraparenchymal intracranial hemorrhage: 03/11/19  Moyamoya disease  HTN (hypertension)  History of craniotomy: STA MCA bypass surgery 04/2019      Social History: Non smoker    FAMILY HISTORY:  Family history of coronary artery disease: Mother w/ stents  Family history of atrial fibrillation: Father  Family history of hypertension: Father/Mother      Current Medications:   Aspirin 325mg daily     Assessment/Plan:   This is a 44y  year old male s/p left STA to MCA bypass for left sided dang dang disease 4/2/2019.  Patient presents to neuro-IR for selective cerebral angiography.   Procedure, goals, risks, benefits and alternatives  were discussed with patient and patient's family.  All questions were answered.  Risks include but are not limited to stroke, vessel injury, hemorrhage, and or right  groin hematoma.  Patient demonstrates understanding  of all risks involved with this procedure and wishes to continue.   Appropriate  consent was obtained from patient and consent is in the patient's chart.

## 2019-10-23 NOTE — CHART NOTE - NSCHARTNOTEFT_GEN_A_CORE
Interventional Neuro- Radiology   Procedure Note PA-C    Procedure: Selective Cerebral Angiography   Pre- Procedure Diagnosis: Left STA to MCA bypass April 2019 for left side dang dang disease  Post- Procedure Diagnosis: Patent left sta to mca bypass; no dang dang on the right side    : Dr. Brien Badillo MD  Resident: Dr. Checo Virgen  Nurse Practitioner: Emily Ross    RN: Hermes    Anesthesia: Dr. Hobbs ([facility]     Sheath: 5 Irish short     I/Os:  Estimated blood loss: less than 10cc     IV fluids: 200cc     Urine output: due to void     cc        Contrast Omnipaque 240: 75cc             Vitals: BP: 138/86         HR: 78      Spo2: 100%        Preliminary Report:  Using a 5 Fr short sheath to the right groin under MAC sedation via left vertebral artery,  left internal carotid artery, left external carotid artery, right vertebral artery,  right internal carotid artery, right external carotid artery  a selective cerebral angiography was performed and  demonstrates patent left sta to mca bypass. ( Official note to follow).  Patient tolerated procedure well, hemodynamically stable, no change in neurological status compared to baseline.  Results discussed with neurosurgery, patient and patient's  family.  Groin sheath was removed,  manual compression held to hemostasis  for  21 minutes, no active bleeding, no hematoma, quick clot and safeguard balloon dressing applied at 1000h.  Patient transferred to Recovery Room

## 2019-10-28 ENCOUNTER — APPOINTMENT (OUTPATIENT)
Dept: NEUROLOGY | Facility: CLINIC | Age: 44
End: 2019-10-28
Payer: COMMERCIAL

## 2019-10-28 PROCEDURE — 99214 OFFICE O/P EST MOD 30 MIN: CPT

## 2019-10-29 VITALS
SYSTOLIC BLOOD PRESSURE: 124 MMHG | OXYGEN SATURATION: 98 % | BODY MASS INDEX: 30.78 KG/M2 | RESPIRATION RATE: 19 BRPM | WEIGHT: 215 LBS | HEIGHT: 70 IN | HEART RATE: 94 BPM | DIASTOLIC BLOOD PRESSURE: 86 MMHG | TEMPERATURE: 98.3 F

## 2019-11-01 DIAGNOSIS — I60.9 NONTRAUMATIC SUBARACHNOID HEMORRHAGE, UNSPECIFIED: ICD-10-CM

## 2019-11-01 DIAGNOSIS — I67.5 MOYAMOYA DISEASE: ICD-10-CM

## 2019-11-13 ENCOUNTER — APPOINTMENT (OUTPATIENT)
Dept: NEUROSURGERY | Facility: CLINIC | Age: 44
End: 2019-11-13
Payer: COMMERCIAL

## 2019-11-13 VITALS
DIASTOLIC BLOOD PRESSURE: 81 MMHG | BODY MASS INDEX: 32.93 KG/M2 | TEMPERATURE: 97.6 F | HEART RATE: 75 BPM | OXYGEN SATURATION: 96 % | SYSTOLIC BLOOD PRESSURE: 133 MMHG | HEIGHT: 70 IN | WEIGHT: 230 LBS

## 2019-11-13 PROCEDURE — 99214 OFFICE O/P EST MOD 30 MIN: CPT

## 2019-11-13 NOTE — PHYSICAL EXAM
[General Appearance - Alert] : alert [General Appearance - In No Acute Distress] : in no acute distress [Person] : oriented to person [Time] : oriented to time [Place] : oriented to place [Motor Strength] : muscle strength was normal in all four extremities [Involuntary Movements] : no involuntary movements were seen [] : no respiratory distress [Abnormal Walk] : normal gait

## 2019-11-13 NOTE — CONSULT LETTER
[Dear  ___] : Dear  [unfilled], [Consult Letter:] : I had the pleasure of evaluating your patient, [unfilled]. [Consult Closing:] : Thank you very much for allowing me to participate in the care of this patient.  If you have any questions, please do not hesitate to contact me. [Sincerely,] : Sincerely, [___] : [unfilled] [FreeTextEntry1] : As you know he is a 44yr old male with no significant past medical history. He presented to [facility] with a intraparenchymal hemorrhage in left ventral parietal lobe in March 2019.  He underwent diagnostic cerebral angiography 3/18/2019 which demonstrated left sided dang dang syndrome. On 4/2/2019 he had a left STA to MCA bypass for left sided dang dang disease. He tolerated the surgery well and was discharged home neurologically intact in stable condition. His surgical incision is well healed well. His follow up cerebral angiogram done October 22, 2019 shoed patent left sta to mca graft, no progression to the right side. I recommend follow up with MRA brain non con with NOVA in October 2020 then follow up in the office after.  [FreeTextEntry3] : \par Brien Badillo MD\par Professor of Neurological Surgery and Radiology\par  Department of Neurosurgery\par Director Cerebrovascular Surgery\par [facility] [employer] [facility]\par

## 2019-11-13 NOTE — REASON FOR VISIT
[Follow-Up: _____] : a [unfilled] follow-up visit [FreeTextEntry1] : Catrachito Laureano is a 44yr old male here today for a follow up visit after having cerebral angiogram done 10/22/2019. He underwent a left STA to MCA bypass for left sided dang dang disease 4/2/2019. His surgical incision is well healed. His right groin site is also well healed. He feels like he is a "foggy" at times and his mental acuity is not where it used to be. He also gets headaches intermittently on the left side of his head.

## 2019-11-13 NOTE — ASSESSMENT
[FreeTextEntry1] : Impression: 44yr old male s/p left sta to mca bypass 4/2/2019 for left side dang dang disease \par \par Plan: \par Cerebral angiogram showed patent left sta to mca bypass, no progression of dang dang to the right side \par MRA brain non con with NOVA October 2020 then follow up in the office after \par Follow up with neuro psych in regards to brain feeling "foggy"

## 2019-12-11 NOTE — OCCUPATIONAL THERAPY INITIAL EVALUATION ADULT - DIAGNOSIS, OT EVAL
December 12, 2019      Re: [address]  [address] [facility] [address] [facility] [address]      This is to certify Arnold Resendez is under the care of Dr Basurto.  She underwent surgical intervention on 12/12/19.  She can return to regular work on 12/19/19 with lifting restriction of 20 lbs until 12/26/19.     Please contact our office at [phone] with any additional questions or concerns.      Thank you,        ___________________________________  BIPIN Ball      [facility] [address]  [address] [place] [address]  Phone: [phone]  Fax: [phone]     Statement Selected Patient is functionally independent, no skilled OT intervention is needed.

## 2020-04-29 ENCOUNTER — APPOINTMENT (OUTPATIENT)
Dept: NEUROLOGY | Facility: CLINIC | Age: 45
End: 2020-04-29

## 2020-07-12 NOTE — OCCUPATIONAL THERAPY INITIAL EVALUATION ADULT - LIVES WITH, PROFILE
L wrist pain s/p fall
left tibial plat fx  left wrist fx
lives with spouse and children in a private house, +walk in shower , independent with ADLs and ambulation

## 2020-07-12 NOTE — DIETITIAN INITIAL EVALUATION ADULT. - SOURCE
[address]      Pt Name: Juanita Pierre  MRN: [id number]  [place] 1997  Date of evaluation: 7/12/2020      CHIEF COMPLAINT     No chief complaint on file. HPI  Juanita Pierre is a 25 y.o. female history of type 1 diabetes, poorly controlled, who is recently admitted for DKA, who presents to the emergency department with hyperglycemia, nausea, vomiting, fatigue. She states that she had a insulin pump placed several days ago. Yesterday she started feeling like she was going into DKA. She reports nausea and vomiting, weakness. She denies any pain symptoms. She states her symptoms been constant gradually worsening. Describing near constant nausea and intermittent vomiting. Nothing seems to make it better or worse. This is how she feels when she is about to go into DKA she says. She denies any syncope, lightheadedness, chest pain, shortness of breath, abdominal pain. Except as noted above the remainder of the review of systems was reviewed and negative. Review of Systems   Constitutional: Positive for fatigue. Negative for chills, diaphoresis and fever. HENT: Negative for rhinorrhea, sore throat and trouble swallowing. Eyes: Negative for photophobia and pain. Respiratory: Negative for apnea, cough, chest tightness, shortness of breath and wheezing. Cardiovascular: Negative for chest pain, palpitations and leg swelling. Gastrointestinal: Positive for diarrhea and nausea. Negative for abdominal pain, constipation and vomiting. Endocrine: Negative for polyuria. Genitourinary: Negative for difficulty urinating and dysuria. Musculoskeletal: Negative for back pain, neck pain and neck stiffness. Neurological: Negative for dizziness, speech difficulty, weakness, light-headedness and headaches. Psychiatric/Behavioral: Negative for confusion. The patient is not nervous/anxious. Physical Exam  Vitals signs and nursing note reviewed. Constitutional:       General: She is not in acute distress. Appearance: She is well-developed. Comments: Awake and alert. wretching occasionally in the gurney. HENT:      Head: Normocephalic and atraumatic. Right Ear: External ear normal.      Left Ear: External ear normal.   Eyes:      General: No scleral icterus. Pupils: Pupils are equal, round, and reactive to light. Neck:      Musculoskeletal: Normal range of motion and neck supple. Cardiovascular:      Rate and Rhythm: Normal rate and regular rhythm. Heart sounds: No murmur. Pulmonary:      Effort: Pulmonary effort is normal. No respiratory distress. Breath sounds: Normal breath sounds. No wheezing. Comments: No tachypnea no kussmaul breathing. Abdominal:      Palpations: Abdomen is soft. Tenderness: There is no abdominal tenderness. There is no guarding or rebound. Musculoskeletal: Normal range of motion. General: No tenderness or deformity. Skin:     General: Skin is warm and dry. Capillary Refill: Capillary refill takes less than 2 seconds. Neurological:      General: No focal deficit present. Mental Status: She is alert and oriented to person, place, and time. Cranial Nerves: No cranial nerve deficit. Sensory: No sensory deficit. Motor: No weakness or abnormal muscle tone. Psychiatric:         Mood and Affect: Mood normal.         Behavior: Behavior normal.          Procedures     Central Line Placement Procedure Note    Indication: vascular access, poor peripheral access, hypovolemia, centrally administered medications and need for frequent blood draws    Consent: The patient provided verbal consent for this procedure, due to the emergent nature of the procedure. Procedure:  The patient was positioned appropriately and the skin over the left internal jugular vein was prepped with betadine and draped in a sterile fashion. Local anesthesia was obtained by infiltration using 1% Lidocaine without epinephrine. A large bore needle was used to identify the vein. A guide wire was then inserted into the vein through the needle. A triple lumen catheter was then inserted into the vessel over the guide wire using the Seldinger technique. All ports showed good, free flowing blood return and were flushed with saline solution. The catheter was then securely fastened to the skin with suture. Two sutures were placed into the kit included tube clamp, proximal eyelets and a suture end from each of the securing sutures was extended around the catheter and tied to the proximal eyelets as an added measure to prevent dislodgement. An antibiotic disk was placed and the site was then covered with a sterile dressing. A post procedure X-ray was ordered and showed no evidence of pneumothorax, and did show appropriate positioning of the central line. The patient tolerated the procedure well. Complications: None              MDM   This is a 20-year-old female with a history of type 1 diabetes is poorly controlled with frequent admissions for DKA who presents to the emergency department with hyperglycemia fatigue. In the emergency department she is awake and alert. She is tachycardic in the 130s. Difficulty obtaining access. Where the IV team come to the ER stat were able to draw blood. Difficulty using accessing the line. Venous pH was 7.04. Given acidosis tachycardia verbal consent was obtained for the central line was placed emergently so that fluids and insulin could be started. Patient was started on insulin drip weight-based. Patient also received 2 L normal saline IV in the department. Beta hydroxybutyrate greater than 4.5. Anion gap of 35. Consistent with DKA. Potassium was 5.4. Insulin was started after the metabolic panel. Glucose initially 589. Patient does have leukocytosis.   Chest x-ray and UA not consistent with infectious etiology. There were significant ketones in the urine. Patient had insulin pump 2 days ago which was turned off. DKA likely secondary to poor glycemic control. Discussed the case with Dr. Aries Gaona, critical care, accepts the patient to the ICU for further evaluation. Patient was discussed with Dr. Dung Elmore, accepts patient for further evaluation in the hospital                 --------------------------------------------- PAST HISTORY ---------------------------------------------  Past Medical History:  has a past medical history of Bleeding at insertion site, Common femoral artery injury, right, initial encounter, and DM type 1 (diabetes mellitus, type 1[facility] Past Surgical History:  has a past surgical history that includes Abdomen surgery (N/A, 5/9/2019) and Bartholin gland cyst excision. Social History:  reports that she has never smoked. She has never used smokeless tobacco. She reports that she does not drink alcohol or use drugs. Family History: family history includes Diabetes in her maternal grandmother and paternal grandmother; Stroke in an other family member. The patients home medications have been reviewed. Allergies: Patient has no known allergies.     -------------------------------------------------- RESULTS -------------------------------------------------    Lab  Results for orders placed or performed during the hospital encounter of 07/12/20   CBC Auto Differential   Result Value Ref Range    WBC 17.9 (H) 4.5 - 11.5 E9/L    RBC 4.68 3.50 - 5.50 E12/L    Hemoglobin 13.3 11.5 - 15.5 g/dL    Hematocrit 41.8 34.0 - 48.0 %    MCV 89.3 80.0 - 99.9 fL    MCH 28.4 26.0 - 35.0 pg    MCHC 31.8 (L) 32.0 - 34.5 %    RDW 12.2 11.5 - 15.0 fL    Platelets 193 326 - 934 E9/L    MPV 11.3 7.0 - 12.0 fL    Neutrophils % 81.3 (H) 43.0 - 80.0 %    Immature Granulocytes % 0.6 0.0 - 5.0 %    Lymphocytes % 12.4 (L) 20.0 - 42.0 %    Monocytes % 5.5 2.0 - 12.0 % Eosinophils % 0.0 0.0 - 6.0 %    Basophils % 0.2 0.0 - 2.0 %    Neutrophils Absolute 14.53 (H) 1.80 - 7.30 E9/L    Immature Granulocytes # 0.11 E9/L    Lymphocytes Absolute 2.22 1.50 - 4.00 E9/L    Monocytes Absolute 0.98 (H) 0.10 - 0.95 E9/L    Eosinophils Absolute 0.00 (L) 0.05 - 0.50 E9/L    Basophils Absolute 0.04 0.00 - 0.20 E9/L   Comprehensive Metabolic Panel w/ Reflex to MG   Result Value Ref Range    Sodium 135 132 - 146 mmol/L    Potassium reflex Magnesium 5.4 (H) 3.5 - 5.0 mmol/L    Chloride 94 (L) 98 - 107 mmol/L    CO2 6 (LL) 22 - 29 mmol/L    Anion Gap 35 (H) 7 - 16 mmol/L    Glucose 589 (HH) 74 - 99 mg/dL    BUN 21 (H) 6 - 20 mg/dL    CREATININE 0.7 0.5 - 1.0 mg/dL    GFR Non-African American >60 >=60 mL/min/1.73    GFR African American >60     Calcium 10.5 (H) 8.6 - 10.2 mg/dL    Total Protein 8.4 (H) 6.4 - 8.3 g/dL    Alb 4.7 3.5 - 5.2 g/dL    Total Bilirubin 0.5 0.0 - 1.2 mg/dL    Alkaline Phosphatase 76 35 - 104 U/L    ALT 22 0 - 32 U/L    AST 23 0 - 31 U/L   Lipase   Result Value Ref Range    Lipase 8 (L) 13 - 60 U/L   Urinalysis, reflex to microscopic   Result Value Ref Range    Color, UA Straw Straw/Yellow    Clarity, UA Clear Clear    Glucose, Ur >=1000 (A) Negative mg/dL    Bilirubin Urine Negative Negative    Ketones, Urine >=80 (A) Negative mg/dL    Specific Gravity, UA 1.025 1.005 - 1.030    Blood, Urine TRACE-INTACT Negative    pH, UA 5.5 5.0 - 9.0    Protein, UA Negative Negative mg/dL    Urobilinogen, Urine 0.2 <2.0 E.U./dL    Nitrite, Urine Negative Negative    Leukocyte Esterase, Urine Negative Negative   PH, VENOUS   Result Value Ref Range    pH, Christoph 7.04 (LL) 7.35 - 7.45   Beta-Hydroxybutyrate   Result Value Ref Range    Beta-Hydroxybutyrate >4.50 (H) 0.02 - 0.27 mmol/L   Lactic Acid, Plasma   Result Value Ref Range    Lactic Acid 4.5 (HH) 0.5 - 2.2 mmol/L   Microscopic Urinalysis   Result Value Ref Range    WBC, UA 0-1 0 - 5 /HPF    RBC, UA 1-3 0 - 2 /HPF    Epithelial Cells, UA FEW /HPF    Bacteria, UA RARE (A) None Seen /HPF    Trichomonas, UA Present (A) None Seen /HPF   Basic Metabolic Panel w/ Reflex to MG   Result Value Ref Range    Sodium 136 132 - 146 mmol/L    Potassium reflex Magnesium 4.7 3.5 - 5.0 mmol/L    Chloride 101 98 - 107 mmol/L    CO2 4 (LL) 22 - 29 mmol/L    Anion Gap 31 (H) 7 - 16 mmol/L    Glucose 508 (HH) 74 - 99 mg/dL    BUN 21 (H) 6 - 20 mg/dL    CREATININE 0.7 0.5 - 1.0 mg/dL    GFR Non-African American >60 >=60 mL/min/1.73    GFR African American >60     Calcium 9.2 8.6 - 10.2 mg/dL   Lactic acid, plasma   Result Value Ref Range    Lactic Acid 2.9 (H) 0.5 - 2.2 mmol/L   Magnesium   Result Value Ref Range    Magnesium 1.9 1.6 - 2.6 mg/dL   Phosphorus   Result Value Ref Range    Phosphorus 5.2 (H) 2.5 - 4.5 mg/dL   POC Pregnancy Urine   Result Value Ref Range    HCG, Urine, POC Negative Negative    Lot Number [id number]     Positive QC Pass/Fail Pass     Negative QC Pass/Fail Pass    POCT Glucose   Result Value Ref Range    Meter Glucose >500 (H) 74 - 99 mg/dL   POCT Glucose   Result Value Ref Range    Meter Glucose >500 (H) 74 - 99 mg/dL   POCT Glucose   Result Value Ref Range    Meter Glucose 407 (H) 74 - 99 mg/dL   EKG 12 Lead   Result Value Ref Range    Ventricular Rate 127 BPM    Atrial Rate 127 BPM    P-R Interval 144 ms    QRS Duration 64 ms    Q-T Interval 322 ms    QTc Calculation (Bazett) 467 ms    P Axis 81 degrees    R Axis 67 degrees    T Axis 62 degrees       Radiology  XR CHEST PORTABLE   Final Result   Interval insertion of left internal jugular central venous catheter,   as described. No sign of acute airspace disease. XR CHEST PORTABLE   Final Result   No sign of acute pulmonary airspace disease. EKG:  This EKG is signed and interpreted by me. Rate: 127bpm  Rhythm: Sinus tachycardia  Interpretation: Normal QTC of 467 ms. Normal OK interval 144 ms. Normal axis. No ST segment elevation or depression.   Comparison: stable as compared to patient's most recent EKG      ------------------------- NURSING NOTES AND VITALS REVIEWED ---------------------------  Date / Time Roomed:  7/12/2020 10:55 AM  ED Bed Assignment:  [id number]    The nursing notes within the ED encounter and vital signs as below have been reviewed. Patient Vitals for the past 24 hrs:   BP Temp Temp src Pulse Resp SpO2 Height Weight   07/12/20 1800 119/72 -- -- 133 19 100 % -- --   07/12/20 1700 121/65 98 °F (36.7 °C) Axillary 128 23 100 % 5' 1\" (1.549 m) 91 lb (41.3 kg)   07/12/20 1600 (!) 107/57 -- -- 129 -- 99 % -- --   07/12/20 1545 (!) 106/57 -- -- 128 -- 96 % -- --   07/12/20 1530 (!) 112/55 -- -- 128 -- 99 % -- --   07/12/20 1515 (!) 104/53 -- -- 126 -- 97 % -- --   07/12/20 1500 (!) 103/52 -- -- 125 -- 100 % -- --   07/12/20 1445 (!) 107/59 -- -- 125 -- 100 % -- --   07/12/20 1430 (!) 113/56 -- -- 127 -- 99 % -- --   07/12/20 1415 (!) 109/55 -- -- 126 -- 99 % -- --   07/12/20 1400 (!) 107/50 -- -- 126 -- (!) 80 % -- --   07/12/20 1345 (!) 93/53 -- -- 124 -- 100 % -- --   07/12/20 1330 (!) 116/52 -- -- 126 -- 98 % -- --   07/12/20 1315 118/64 -- -- 126 -- 97 % -- --   07/12/20 1254 -- -- -- -- -- -- -- 91 lb (41.3 kg)       Oxygen Saturation Interpretation: Normal      ------------------------------------------ PROGRESS NOTES ------------------------------------------      I have spoken with the patient and discussed todays results, in addition to providing specific details for the plan of care and counseling regarding the diagnosis and prognosis. Their questions are answered at this time and they are agreeable with the plan.      --------------------------------- ADDITIONAL PROVIDER NOTES ---------------------------------  Consultations:  Spoke with Dr. Heriberto Van,  They will provide consultation. Discussed with Dr. Ferdinand Chaves, hospitalist, who will  Admit.    This patient's ED course included: a personal history and physicial examination, re-evaluation prior to disposition, multiple bedside re-evaluations, IV medications, cardiac monitoring, continuous pulse oximetry and complex medical decision making and emergency management    This patient has remained hemodynamically stable and remained unchanged during their ED course. Please note that the withdrawal or failure to initiate urgent interventions for this patient would likely result in a life threatening deterioration or permanent disability. Accordingly this patient received 35 minutes of critical care time, excluding separately billable procedures. Clinical Impression  1. Diabetic ketoacidosis without coma associated with type 1 diabetes mellitus (HCC)    2. Dehydration    3. Metabolic acidosis    4. Tachycardia    5. Leukocytosis, unspecified type          Disposition  Patient's disposition: Admit to CCU/ICU  Patient's condition is stable.            Michael Fernández DO  Resident  07/12/20 8913 family/significant other/chart/patient

## 2020-07-13 ENCOUNTER — APPOINTMENT (OUTPATIENT)
Dept: NEUROLOGY | Facility: CLINIC | Age: 45
End: 2020-07-13
Payer: COMMERCIAL

## 2020-07-13 VITALS
WEIGHT: 215 LBS | HEART RATE: 81 BPM | SYSTOLIC BLOOD PRESSURE: 127 MMHG | HEIGHT: 70 IN | DIASTOLIC BLOOD PRESSURE: 83 MMHG | BODY MASS INDEX: 30.78 KG/M2

## 2020-07-13 VITALS — TEMPERATURE: 97.2 F

## 2020-07-13 DIAGNOSIS — H54.7 UNSPECIFIED VISUAL LOSS: ICD-10-CM

## 2020-07-13 PROCEDURE — 99214 OFFICE O/P EST MOD 30 MIN: CPT

## 2020-07-13 RX ORDER — AMLODIPINE BESYLATE 10 MG/1
10 TABLET ORAL DAILY
Refills: 0 | Status: DISCONTINUED | COMMUNITY
End: 2020-07-13

## 2020-07-22 ENCOUNTER — OUTPATIENT (OUTPATIENT)
Dept: OUTPATIENT SERVICES | Facility: HOSPITAL | Age: 45
LOS: 1 days | End: 2020-07-22
Payer: COMMERCIAL

## 2020-07-22 ENCOUNTER — APPOINTMENT (OUTPATIENT)
Dept: MRI IMAGING | Facility: HOSPITAL | Age: 45
End: 2020-07-22

## 2020-07-22 ENCOUNTER — RESULT REVIEW (OUTPATIENT)
Age: 45
End: 2020-07-22

## 2020-07-22 DIAGNOSIS — I67.5 MOYAMOYA DISEASE: ICD-10-CM

## 2020-07-22 DIAGNOSIS — I61.9 NONTRAUMATIC INTRACEREBRAL HEMORRHAGE, UNSPECIFIED: ICD-10-CM

## 2020-07-22 DIAGNOSIS — Z98.890 OTHER SPECIFIED POSTPROCEDURAL STATES: Chronic | ICD-10-CM

## 2020-07-22 DIAGNOSIS — Z00.00 ENCOUNTER FOR GENERAL ADULT MEDICAL EXAMINATION WITHOUT ABNORMAL FINDINGS: ICD-10-CM

## 2020-07-22 PROCEDURE — 70544 MR ANGIOGRAPHY HEAD W/O DYE: CPT | Mod: 26

## 2020-07-22 PROCEDURE — 70544 MR ANGIOGRAPHY HEAD W/O DYE: CPT

## 2020-07-29 ENCOUNTER — NON-APPOINTMENT (OUTPATIENT)
Age: 45
End: 2020-07-29

## 2020-07-29 ENCOUNTER — APPOINTMENT (OUTPATIENT)
Dept: CARDIOLOGY | Facility: CLINIC | Age: 45
End: 2020-07-29
Payer: COMMERCIAL

## 2020-07-29 VITALS
BODY MASS INDEX: 30.35 KG/M2 | OXYGEN SATURATION: 97 % | HEART RATE: 81 BPM | DIASTOLIC BLOOD PRESSURE: 82 MMHG | HEIGHT: 70 IN | SYSTOLIC BLOOD PRESSURE: 124 MMHG | WEIGHT: 212 LBS

## 2020-07-29 DIAGNOSIS — Z82.49 FAMILY HISTORY OF ISCHEMIC HEART DISEASE AND OTHER DISEASES OF THE CIRCULATORY SYSTEM: ICD-10-CM

## 2020-07-29 PROCEDURE — 99204 OFFICE O/P NEW MOD 45 MIN: CPT

## 2020-07-29 PROCEDURE — 99214 OFFICE O/P EST MOD 30 MIN: CPT

## 2020-07-29 PROCEDURE — 93000 ELECTROCARDIOGRAM COMPLETE: CPT

## 2020-07-29 NOTE — PHYSICAL EXAM
[General Appearance - Well Developed] : well developed [Normal Appearance] : normal appearance [Well Groomed] : well groomed [General Appearance - Well Nourished] : well nourished [No Deformities] : no deformities [General Appearance - In No Acute Distress] : no acute distress [Eyelids - No Xanthelasma] : the eyelids demonstrated no xanthelasmas [Normal Conjunctiva] : the conjunctiva exhibited no abnormalities [Normal Oral Mucosa] : normal oral mucosa [No Oral Pallor] : no oral pallor [Normal Jugular Venous V Waves Present] : normal jugular venous V waves present [Normal Jugular Venous A Waves Present] : normal jugular venous A waves present [No Oral Cyanosis] : no oral cyanosis [No Jugular Venous Cardoza A Waves] : no jugular venous cardoza A waves [Heart Sounds] : normal S1 and S2 [Heart Rate And Rhythm] : heart rate and rhythm were normal [Murmurs] : no murmurs present [Exaggerated Use Of Accessory Muscles For Inspiration] : no accessory muscle use [Respiration, Rhythm And Depth] : normal respiratory rhythm and effort [Auscultation Breath Sounds / Voice Sounds] : lungs were clear to auscultation bilaterally [Abdomen Soft] : soft [Abdomen Tenderness] : non-tender [Abdomen Mass (___ Cm)] : no abdominal mass palpated [Abnormal Walk] : normal gait [Gait - Sufficient For Exercise Testing] : the gait was sufficient for exercise testing [Petechial Hemorrhages (___cm)] : no petechial hemorrhages [Cyanosis, Localized] : no localized cyanosis [Nail Clubbing] : no clubbing of the fingernails [Skin Color & Pigmentation] : normal skin color and pigmentation [] : no rash [No Venous Stasis] : no venous stasis [Skin Lesions] : no skin lesions [No Skin Ulcers] : no skin ulcer [No Xanthoma] : no  xanthoma was observed [Oriented To Time, Place, And Person] : oriented to person, place, and time [Affect] : the affect was normal [Mood] : the mood was normal [No Anxiety] : not feeling anxious

## 2020-08-23 NOTE — REASON FOR VISIT
[Initial Evaluation] : an initial evaluation of [FreeTextEntry1] : Tachycardia [Hypertension] : hypertension

## 2020-08-23 NOTE — DISCUSSION/SUMMARY
[FreeTextEntry1] : Overall doing well from a cardiac standpoint.\par Continue present medications.\par Follow up in 6 months.

## 2020-08-23 NOTE — HISTORY OF PRESENT ILLNESS
[FreeTextEntry1] : Mr. Laureano presents for cardiac evaluations. He has possible Flores Flores. He has had elevated BP. Possible plan for cerebral angio. He had history of higher resting HR. BP was borderline elevated. After surgery for brain bleed BP and HR improved. ECGs done in past and have been ok. Stress test a year ago at [place]. This was normal. Echo 3/2019 was normal. Patient denies cardiac symptoms. No chest pain. No SOB.

## 2020-12-12 NOTE — PATIENT PROFILE ADULT - NSPROPTRIGHTNOTIFY_GEN_A_NUR
Pt arrives c/o blood in stool \"for awhile.\" Pt states drops of blood for a long time, last few weeks has had more blood in his stool. Bright red blood with BMs, stool not black.    declines

## 2021-01-13 ENCOUNTER — APPOINTMENT (OUTPATIENT)
Dept: NEUROLOGY | Facility: CLINIC | Age: 46
End: 2021-01-13
Payer: COMMERCIAL

## 2021-01-13 VITALS
DIASTOLIC BLOOD PRESSURE: 84 MMHG | SYSTOLIC BLOOD PRESSURE: 130 MMHG | BODY MASS INDEX: 30.78 KG/M2 | WEIGHT: 215 LBS | HEIGHT: 70 IN

## 2021-01-13 VITALS — TEMPERATURE: 97.2 F

## 2021-01-13 PROCEDURE — 99215 OFFICE O/P EST HI 40 MIN: CPT

## 2021-01-13 PROCEDURE — 99072 ADDL SUPL MATRL&STAF TM PHE: CPT

## 2021-01-13 NOTE — HISTORY OF PRESENT ILLNESS
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 MRA NOVA completed, reviewd by Dr. Badillo who requests a follow up in July 2021. ROS revealed residual left cephalgic pressure post-craniotomy, blurry vision (still pending ophtho appointment), otherwise remarkable for any new focal weakness, numbness, gait issues. Also unremarkable chest pain, SOB, abdominal pain, N/V, loss of bowel/bladder control, no seizures. \par _____________\par Mr. Laureano is a 45 year old male PMHx HTN who presents today for  follow up after a L ICH on 3/15/19 ; STA MCA bypass on 04/12/19; for  Flores Flores\par \par HPI:\par 45 yo RH  male PMH HTN tx from OSH, p/w HA. LKW 3/11 2230. Patient developed sudden onset HA(felt like baseball bat to the head) at 2230 on 3/11; 15 minutes later he developed intractable N/V. Went to Urgent Care 3/12, sent home on Tamiflu. HA/nausea/vomiting persisted, so patient went to [facility] ED where CT/CTA done showing IPH in L ventral parietal lobe rupturing into subarachnoid space, CTA head w/ findings concerning for Flores Flores. Transferred to [facility] for further workup. On initial evaluation at [facility], patient HA improved although with photophobia, no focal neurologic deficits, NIHSS 0. \par \par CT HEAD 3/16/19\par Acute intraparenchymal hemorrhage in the ventral left parietal lobe \par measuring 1.9 x 1 x 1 cm appears to have ruptured into the subarachnoid \par space.  There is mild subarachnoid hemorrhage in the left sylvian fissure \par and trace subarachnoid hemorrhage in the left frontal and parietal \par regions.  Follow-up CT is recommended in 6 hour to exclude worsening \par hemorrhage.\par \par CTA HEAD/NECK 3.16.19\par CT BRAIN: Parenchymal hemorrhage in the left posterior basal ganglia at \par the junction of the posterior limb of the internal capsule measuring 1.1 \par x 1.1 cm with surrounding perihematoma edema. Subarachnoid hemorrhage in \par the left sylvian fissure and left sylvian cistern likely due to rupture \par of the hematoma into the subarachnoid space. Trace high left parietal \par subarachnoid hemorrhage.\par \par CTA HEAD: Diminution of the left internal carotid artery in comparison to \par the right with tapering of the supraclinoid left ICA up to the carotid \par terminus with lack of visualization of the left M1 segment. Serpiginous \par high density at the level of the left carotid terminus which could \par represent collateralization to lenticulostriates. Diminutive left M2 \par branches seen in the sylvian cistern and overall diminutive cortical \par branches of the left MCA distribution in comparison to the right. Left A1 \par segment supplied by the anterior communicating artery. Serpiginous tangle \par of vessels adjacent to the left anterior cerebral artery along the falx \par which also may represent collateralization. Overall, the constellation of \par findings is concerning for moyamoya disease. Further evaluation with \par diagnostic cerebral angiogram and MRI is recommended.\par \par CT HEAD 3.17.19\par Unchanged left intraparenchymal hemorrhage involving the posterior limb \par of the left internal capsule, left external capsule and putamen with \par surrounding edema and small associated subarachnoid hemorrhage. No \par midline shift.\par \par No new areas of hemorrhage. 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 Evidence of the left supraclinoid carotid occlusion is noted with the \par presence of a hemorrhage in the distal left internal capsule region in \par the posterior subinsular region.\par 2. MRA of the [place] demonstrates a left supraclinoid \par occlusion. The left A2 is fed from the right. Evidence of \par collateralization with increased flow in the left A2 and left PCA as \par indicated on the  NOVA study. No left M1 is visualized. The more distal \par MCA on the left likely fills from collateralization via the hypertrophied \par  lenticulostriates visualized on patient's catheter angiogram 3/18/2019 \par as well as the leptomeningeal collaterals. Nova report is included for \par review.\par \par NM SPECT WITH DIAMOX 3.19.19\par Decreased uptake in the right caudate nucleus and right putamen with no \par corresponding abnormality on CT.\par Mildly decreased uptake corresponding to left intraparenchymal hemorrhage \par seen on CT from 3/16/2019 and 3/17/2019.\par \par NM SPECT WITH DIAMOX 3.21.19\par Decreased uptake in the right caudate nucleus and right putamen with no \par corresponding abnormality on CT and mildly decreased uptake corresponding \par to left intraparenchymal hemorrhage seen on CT from 3/16/2019 and \par 3/17/2019, not significantly changed from the Diamox SPECT/CT on \par 3/19/2019.\par \par CT CHEST 3.21.19\par No pulmonary embolus is noted.\par \par TTE 3.17.19\par 1. Normal left ventricular internal dimensions and wall\par thicknesses.\par 2. Normal left ventricular systolic function. No segmental\par wall motion abnormalities.\par \par 10/28/19\par He reports he has had 2 sleep study test which revealed he has mild sleep apnea. He is under the care of an ENT (Dr. Lan) who started him on pantoprazole for GERD for treatment of sleep apnea. At times he feels lightheaded while bending down. He also remains with hemicrania headaches and left sided pressure at times. He has not yet followed up with neuropsychological testing as recommended by Dr. Bolton. He had a cerebral angiogram completed a few days ago by Dr. Badillo and it was reportedly negative for any acute findings and considered normal. \par \par 7/13/20\par Today he continues to complain of left sided pressure that is increasing in frequency. He reports they are associated with dizziness and visual issues at times. He states that he feels his vision is not as "sharp". He denies any other associated neurological symptoms. He reports that BP has been <140/90. Of note, he was dx was mild sleep apnea according to patient and he denies compliance with the CPAP because he lost weight and "sleeps better now" \par \par \par

## 2021-01-13 NOTE — DISCUSSION/SUMMARY
[Intensive Blood Pressure Control] : intensive blood pressure control [Patient encouraged to discuss with Primary MD] : I encouraged the patient to discuss these important issues with ~his/her~ primary care doctor [Goals and Counseling] : I have reviewed the goals of stroke risk factor modification. I counseled the patient on measures to reduce stroke risk, including the importance of medication compliance, risk factor control, exercise, healthy diet and avoidance of smoking. I reviewed stroke warning signs and symptoms and appropriate actions to take if such occur. 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 Will have repeat imaging in July 2021. \par - Follow up with neuro opthalmology still pending, delayed due to COVID concerns \par - Discussed the importance of staying hydrated with water. \par - Continue ASA 81 mg \par - No indications for statin therapy considering low 10-year ASCVD risk (1.7%) and nonatherosclerotic etiology of his large vessel intracranial vasculopathy\par - Continue to monitor BP at home and notify PCP/Cardiology for readings >140/90. Educated on medication compliance and BP monitoring to prevent secondary stroke and systemic problems . \par - Follow up with PCP for statin management and primary care needs such as regular blood work including monitoring of HbA1c (4.6) and cholesterol profile (goal LDL <70), to modify vascular risk factors \par - Discussed the importance of CPAP compliance and advised to follow up with ENT for further evaluation \par - Follow up with Dr. Badillo as directed\par - Carotid Doppler's and TCD to be obtain yearly \par - Educated on stroke/TIA signs/bleeding signs and symptoms and to call 911 if experiencing any of these symptoms\par - Referral for neuropsychological testing provided for evaluation of slow processing - not yet completed \par - Follow up in 6 months

## 2021-03-29 ENCOUNTER — APPOINTMENT (OUTPATIENT)
Dept: CARDIOLOGY | Facility: CLINIC | Age: 46
End: 2021-03-29
Payer: COMMERCIAL

## 2021-03-29 VITALS
WEIGHT: 215 LBS | SYSTOLIC BLOOD PRESSURE: 129 MMHG | OXYGEN SATURATION: 97 % | DIASTOLIC BLOOD PRESSURE: 82 MMHG | BODY MASS INDEX: 30.78 KG/M2 | HEIGHT: 70 IN | HEART RATE: 69 BPM

## 2021-03-29 DIAGNOSIS — I10 ESSENTIAL (PRIMARY) HYPERTENSION: ICD-10-CM

## 2021-03-29 PROCEDURE — 99072 ADDL SUPL MATRL&STAF TM PHE: CPT

## 2021-03-29 PROCEDURE — 99214 OFFICE O/P EST MOD 30 MIN: CPT

## 2021-03-29 RX ORDER — METOPROLOL SUCCINATE 50 MG/1
50 TABLET, EXTENDED RELEASE ORAL
Qty: 30 | Refills: 0 | Status: ACTIVE | COMMUNITY
Start: 2021-03-12

## 2021-03-29 RX ORDER — LOSARTAN POTASSIUM 100 MG/1
100 TABLET, FILM COATED ORAL DAILY
Refills: 0 | Status: DISCONTINUED | COMMUNITY
End: 2021-03-29

## 2021-03-29 NOTE — PHYSICAL EXAM
[General Appearance - Well Developed] : well developed [Normal Appearance] : normal appearance [Well Groomed] : well groomed [General Appearance - Well Nourished] : well nourished [No Deformities] : no deformities [General Appearance - In No Acute Distress] : no acute distress [Normal Conjunctiva] : the conjunctiva exhibited no abnormalities [Eyelids - No Xanthelasma] : the eyelids demonstrated no xanthelasmas [Normal Oral Mucosa] : normal oral mucosa [No Oral Pallor] : no oral pallor [No Oral Cyanosis] : no oral cyanosis [Normal Jugular Venous A Waves Present] : normal jugular venous A waves present [Normal Jugular Venous V Waves Present] : normal jugular venous V waves present [No Jugular Venous Cardoza A Waves] : no jugular venous cardoza A waves [Respiration, Rhythm And Depth] : normal respiratory rhythm and effort [Exaggerated Use Of Accessory Muscles For Inspiration] : no accessory muscle use [Auscultation Breath Sounds / Voice Sounds] : lungs were clear to auscultation bilaterally [Heart Rate And Rhythm] : heart rate and rhythm were normal [Heart Sounds] : normal S1 and S2 [Murmurs] : no murmurs present [Abdomen Soft] : soft [Abdomen Tenderness] : non-tender [Abdomen Mass (___ Cm)] : no abdominal mass palpated [Abnormal Walk] : normal gait [Gait - Sufficient For Exercise Testing] : the gait was sufficient for exercise testing [Nail Clubbing] : no clubbing of the fingernails [Cyanosis, Localized] : no localized cyanosis [Petechial Hemorrhages (___cm)] : no petechial hemorrhages [Skin Color & Pigmentation] : normal skin color and pigmentation [] : no rash [No Venous Stasis] : no venous stasis [Skin Lesions] : no skin lesions [No Skin Ulcers] : no skin ulcer [No Xanthoma] : no  xanthoma was observed [Oriented To Time, Place, And Person] : oriented to person, place, and time [Affect] : the affect was normal [Mood] : the mood was normal [No Anxiety] : not feeling anxious

## 2021-04-18 NOTE — ED ADULT NURSE NOTE - NS ED NOTE  TALK SOMEONE YN
No
Alert-The patient is alert, awake and responds to voice. The patient is oriented to time, place, and person. The triage nurse is able to obtain subjective information.
No

## 2021-04-21 ENCOUNTER — APPOINTMENT (OUTPATIENT)
Dept: NEUROSURGERY | Facility: CLINIC | Age: 46
End: 2021-04-21
Payer: COMMERCIAL

## 2021-04-21 PROCEDURE — 99213 OFFICE O/P EST LOW 20 MIN: CPT

## 2021-04-21 PROCEDURE — 99072 ADDL SUPL MATRL&STAF TM PHE: CPT

## 2021-04-23 NOTE — REASON FOR VISIT
[Follow-Up: _____] : a [unfilled] follow-up visit [FreeTextEntry1] : Catrachito Laureano is a 46yr old male here today for a follow up visit.  To review has a past medical history of intracerebral hemorrhage, noted to have left side dang dang at that time. Underwent  left STA to MCA bypass for left sided dang dang disease 4/2/2019.  Has been getting yearly MRA brain non con NOVA toe evaluate flow and evaluate for progression to the right side. Today he feels intense pressure behind his left eye and his left eye lid is drooping. HE also has pain and pressure in his head on the left side.  Additionally his vision has acutely worsened and he feels his eye do not focus.

## 2021-04-23 NOTE — PHYSICAL EXAM
[General Appearance - Alert] : alert [General Appearance - In No Acute Distress] : in no acute distress [Person] : oriented to person [Place] : oriented to place [Time] : oriented to time [Cranial Nerves Oculomotor (III)] : extraocular motion intact [Cranial Nerves Trigeminal (V)] : facial sensation intact symmetrically [Cranial Nerves Facial (VII)] : face symmetrical [Cranial Nerves Vestibulocochlear (VIII)] : hearing was intact bilaterally [Cranial Nerves Glossopharyngeal (IX)] : tongue and palate midline [Cranial Nerves Accessory (XI - Cranial And Spinal)] : head turning and shoulder shrug symmetric [Cranial Nerves Hypoglossal (XII)] : there was no tongue deviation with protrusion [Motor Strength] : muscle strength was normal in all four extremities [Involuntary Movements] : no involuntary movements were seen [FreeTextEntry5] : left eye ptosis

## 2021-04-23 NOTE — ASSESSMENT
[FreeTextEntry1] : Impression: 46yr old male who underwent left STA to MCA bypass for left sided dang dang disease 4/2/2019\par \par Plan:\par Given his current symptoms of left eye pressure/ptosis, left side headache pain and pressure as well as change in vision recommend MRI brain non con now to evaluate for stroke; MRA brain non con NOVA to evaluate for patency of bypass\par Refer to neuro optho for formal evaluation \par Follow up with Dr. Badillo after imaging is completed \par Educated patient on signs and symptoms of a stroke and should they occur he needs to seek medical attention immediately \par In regards to blood pressure advise sbp 100-140; should cardiology wish to lower metoprolol dose due to his bradycardia neurosurgery is ok with that

## 2021-04-23 NOTE — REVIEW OF SYSTEMS
[Memory Lapses or Loss] : memory loss [Decr. Concentrating Ability] : decreased concentrating ability [As Noted in HPI] : as noted in HPI [Eyesight Problems] : eyesight problems [Negative] : Heme/Lymph [de-identified] : headache [FreeTextEntry3] : left eye lid drooping ptosis

## 2021-04-28 NOTE — H&P PST ADULT - WEIGHT IN LBS
I reviewed the H&P, I examined the patient, and there are no changes in the patient's condition.    
220

## 2021-05-10 NOTE — HISTORY OF PRESENT ILLNESS
[FreeTextEntry1] : Good days and bad. About 50/50. Bad days are usually headaches. \par Metoprolol and baby asa still on board.

## 2021-05-10 NOTE — DISCUSSION/SUMMARY
[FreeTextEntry1] : BP is usually 110-120/70-80s\par HR 70s reviewed his data HR 40s-125. Will discuss with neuro if he needs metoprolol.

## 2021-05-18 ENCOUNTER — APPOINTMENT (OUTPATIENT)
Dept: MRI IMAGING | Facility: HOSPITAL | Age: 46
End: 2021-05-18

## 2021-08-03 ENCOUNTER — APPOINTMENT (OUTPATIENT)
Dept: NEUROLOGY | Facility: CLINIC | Age: 46
End: 2021-08-03
Payer: COMMERCIAL

## 2021-08-03 DIAGNOSIS — I67.5 MOYAMOYA DISEASE: ICD-10-CM

## 2021-08-03 DIAGNOSIS — I61.9 NONTRAUMATIC INTRACEREBRAL HEMORRHAGE, UNSPECIFIED: ICD-10-CM

## 2021-08-03 PROCEDURE — 99215 OFFICE O/P EST HI 40 MIN: CPT | Mod: 95

## 2021-08-08 ENCOUNTER — NON-APPOINTMENT (OUTPATIENT)
Age: 46
End: 2021-08-08

## 2021-08-16 ENCOUNTER — NON-APPOINTMENT (OUTPATIENT)
Age: 46
End: 2021-08-16

## 2021-08-25 ENCOUNTER — APPOINTMENT (OUTPATIENT)
Dept: NEUROLOGY | Facility: CLINIC | Age: 46
End: 2021-08-25

## 2021-09-27 ENCOUNTER — APPOINTMENT (OUTPATIENT)
Dept: CARDIOLOGY | Facility: CLINIC | Age: 46
End: 2021-09-27

## 2021-09-27 ENCOUNTER — NON-APPOINTMENT (OUTPATIENT)
Age: 46
End: 2021-09-27

## 2022-07-01 NOTE — ED ADULT NURSE NOTE - GASTROINTESTINAL ASSESSMENT
Report rec'd from MANPREET Magallanes.  Pt resting in [facility].    Sitter at bedside.    WDL

## 2022-07-14 NOTE — H&P PST ADULT - DOCUMENT STATUS
Case: [id number] Date/Time: 07/14/22 1145    Procedures:       PROSTATECTOMY, ROBOT-ASSISTED, USING DA CONY XI      LYMPHADENECTOMY, ROBOT-ASSISTED, USING DA CONY XI - PELVIC LYMPH NODE DISSECTION (Bilateral )    Pre-op diagnosis: CARCINOMA OF PROSTATE    Location: [facility]    Surgeons: Jose D Fletcher M.D.          Relevant Problems   No relevant active problems       Physical Exam    Airway   Mallampati: II  TM distance: >3 FB  Neck ROM: full       Cardiovascular - normal exam  Rhythm: regular  Rate: normal  (-) murmur     Dental - normal exam           Pulmonary - normal exam  Breath sounds clear to auscultation     Abdominal    Neurological - normal exam                 Anesthesia Plan    ASA 1       Plan - general       Airway plan will be ETT          Induction: intravenous    Postoperative Plan: Postoperative administration of opioids is intended.    Pertinent diagnostic labs and testing reviewed    Informed Consent:    Anesthetic plan and risks discussed with patient.    Use of blood products discussed with: patient whom consented to blood products.         
Authored by Resident/PA/NP

## 2024-02-14 NOTE — PATIENT PROFILE ADULT - HAS THE PATIENT EXPERIENCED ANY OF THE FOLLOWING WITHIN THE WEEK PRIOR TO ADMISSION?
3/6 systolic murmur/regular rate and rhythm/S1 S2 present/murmur/pedal edema/vascular
cerebrovascular accident
